# Patient Record
Sex: FEMALE | Race: WHITE | NOT HISPANIC OR LATINO | Employment: FULL TIME | URBAN - METROPOLITAN AREA
[De-identification: names, ages, dates, MRNs, and addresses within clinical notes are randomized per-mention and may not be internally consistent; named-entity substitution may affect disease eponyms.]

---

## 2017-01-16 ENCOUNTER — GENERIC CONVERSION - ENCOUNTER (OUTPATIENT)
Dept: OTHER | Facility: OTHER | Age: 47
End: 2017-01-16

## 2017-06-30 ENCOUNTER — HOSPITAL ENCOUNTER (OUTPATIENT)
Dept: ULTRASOUND IMAGING | Facility: HOSPITAL | Age: 47
Discharge: HOME | End: 2017-06-30
Payer: COMMERCIAL

## 2017-06-30 ENCOUNTER — ALLSCRIPTS OFFICE VISIT (OUTPATIENT)
Dept: OTHER | Facility: OTHER | Age: 47
End: 2017-06-30

## 2017-06-30 DIAGNOSIS — R10.11 RIGHT UPPER QUADRANT PAIN: ICD-10-CM

## 2017-06-30 PROCEDURE — 76705 ECHO EXAM OF ABDOMEN: CPT

## 2017-09-07 DIAGNOSIS — Z12.31 ENCOUNTER FOR SCREENING MAMMOGRAM FOR MALIGNANT NEOPLASM OF BREAST: ICD-10-CM

## 2017-10-09 ENCOUNTER — HOSPITAL ENCOUNTER (OUTPATIENT)
Dept: MAMMOGRAPHY | Facility: CLINIC | Age: 47
Discharge: HOME | End: 2017-10-09
Payer: COMMERCIAL

## 2017-10-09 DIAGNOSIS — Z12.31 ENCOUNTER FOR SCREENING MAMMOGRAM FOR MALIGNANT NEOPLASM OF BREAST: ICD-10-CM

## 2017-10-09 PROCEDURE — G0202 SCR MAMMO BI INCL CAD: HCPCS

## 2017-12-06 ENCOUNTER — APPOINTMENT (EMERGENCY)
Dept: CT IMAGING | Facility: HOSPITAL | Age: 47
End: 2017-12-06
Payer: COMMERCIAL

## 2017-12-06 ENCOUNTER — HOSPITAL ENCOUNTER (OUTPATIENT)
Facility: HOSPITAL | Age: 47
Setting detail: OBSERVATION
LOS: 2 days | Discharge: HOME | End: 2017-12-08
Attending: EMERGENCY MEDICINE | Admitting: HOSPITALIST
Payer: COMMERCIAL

## 2017-12-06 DIAGNOSIS — R10.9 ABDOMINAL PAIN: ICD-10-CM

## 2017-12-06 DIAGNOSIS — D72.829 LEUKOCYTOSIS: ICD-10-CM

## 2017-12-06 DIAGNOSIS — K57.32 DIVERTICULITIS OF LARGE INTESTINE WITHOUT PERFORATION OR ABSCESS WITHOUT BLEEDING: Primary | ICD-10-CM

## 2017-12-06 PROBLEM — E03.9 HYPOTHYROID: Chronic | Status: ACTIVE | Noted: 2017-12-06

## 2017-12-06 PROBLEM — F32.A DEPRESSION: Chronic | Status: ACTIVE | Noted: 2017-12-06

## 2017-12-06 LAB
ALBUMIN SERPL BCP-MCNC: 3.9 G/DL (ref 3.5–5)
ALP SERPL-CCNC: 69 U/L (ref 46–116)
ALT SERPL W P-5'-P-CCNC: 19 U/L (ref 12–78)
ANION GAP SERPL CALCULATED.3IONS-SCNC: 11 MMOL/L (ref 4–13)
AST SERPL W P-5'-P-CCNC: 12 U/L (ref 5–45)
BACTERIA UR QL AUTO: ABNORMAL /HPF
BASOPHILS # BLD AUTO: 0.03 THOUSANDS/ΜL (ref 0–0.1)
BASOPHILS NFR BLD AUTO: 0 % (ref 0–1)
BILIRUB SERPL-MCNC: 0.6 MG/DL (ref 0.2–1)
BILIRUB UR QL STRIP: NEGATIVE
BUN SERPL-MCNC: 11 MG/DL (ref 5–25)
CALCIUM SERPL-MCNC: 10.2 MG/DL (ref 8.3–10.1)
CHLORIDE SERPL-SCNC: 100 MMOL/L (ref 100–108)
CLARITY UR: CLEAR
CO2 SERPL-SCNC: 29 MMOL/L (ref 21–32)
COLOR UR: YELLOW
CREAT SERPL-MCNC: 1.13 MG/DL (ref 0.6–1.3)
EOSINOPHIL # BLD AUTO: 0.07 THOUSAND/ΜL (ref 0–0.61)
EOSINOPHIL NFR BLD AUTO: 0 % (ref 0–6)
ERYTHROCYTE [DISTWIDTH] IN BLOOD BY AUTOMATED COUNT: 13.6 % (ref 11.6–15.1)
GFR SERPL CREATININE-BSD FRML MDRD: 58 ML/MIN/1.73SQ M
GLUCOSE SERPL-MCNC: 90 MG/DL (ref 65–140)
GLUCOSE UR STRIP-MCNC: NEGATIVE MG/DL
HCT VFR BLD AUTO: 41.7 % (ref 34.8–46.1)
HGB BLD-MCNC: 13.3 G/DL (ref 11.5–15.4)
HGB UR QL STRIP.AUTO: ABNORMAL
KETONES UR STRIP-MCNC: NEGATIVE MG/DL
LEUKOCYTE ESTERASE UR QL STRIP: NEGATIVE
LIPASE SERPL-CCNC: 92 U/L (ref 73–393)
LYMPHOCYTES # BLD AUTO: 1.05 THOUSANDS/ΜL (ref 0.6–4.47)
LYMPHOCYTES NFR BLD AUTO: 7 % (ref 14–44)
MCH RBC QN AUTO: 28.9 PG (ref 26.8–34.3)
MCHC RBC AUTO-ENTMCNC: 31.9 G/DL (ref 31.4–37.4)
MCV RBC AUTO: 91 FL (ref 82–98)
MONOCYTES # BLD AUTO: 0.95 THOUSAND/ΜL (ref 0.17–1.22)
MONOCYTES NFR BLD AUTO: 6 % (ref 4–12)
MUCOUS THREADS UR QL AUTO: ABNORMAL
NEUTROPHILS # BLD AUTO: 13.64 THOUSANDS/ΜL (ref 1.85–7.62)
NEUTS SEG NFR BLD AUTO: 87 % (ref 43–75)
NITRITE UR QL STRIP: NEGATIVE
NON-SQ EPI CELLS URNS QL MICRO: ABNORMAL /HPF
PH UR STRIP.AUTO: 5.5 [PH] (ref 4.5–8)
PLATELET # BLD AUTO: 453 THOUSANDS/UL (ref 149–390)
PMV BLD AUTO: 9.7 FL (ref 8.9–12.7)
POTASSIUM SERPL-SCNC: 3.7 MMOL/L (ref 3.5–5.3)
PROT SERPL-MCNC: 9.1 G/DL (ref 6.4–8.2)
PROT UR STRIP-MCNC: NEGATIVE MG/DL
RBC # BLD AUTO: 4.61 MILLION/UL (ref 3.81–5.12)
RBC #/AREA URNS AUTO: ABNORMAL /HPF
SODIUM SERPL-SCNC: 140 MMOL/L (ref 136–145)
SP GR UR STRIP.AUTO: <=1.005 (ref 1–1.03)
UROBILINOGEN UR QL STRIP.AUTO: 0.2 E.U./DL
WBC # BLD AUTO: 15.74 THOUSAND/UL (ref 4.31–10.16)
WBC #/AREA URNS AUTO: ABNORMAL /HPF

## 2017-12-06 PROCEDURE — 80053 COMPREHEN METABOLIC PANEL: CPT | Performed by: EMERGENCY MEDICINE

## 2017-12-06 PROCEDURE — 36415 COLL VENOUS BLD VENIPUNCTURE: CPT | Performed by: EMERGENCY MEDICINE

## 2017-12-06 PROCEDURE — 96374 THER/PROPH/DIAG INJ IV PUSH: CPT

## 2017-12-06 PROCEDURE — 81001 URINALYSIS AUTO W/SCOPE: CPT | Performed by: EMERGENCY MEDICINE

## 2017-12-06 PROCEDURE — 99285 EMERGENCY DEPT VISIT HI MDM: CPT

## 2017-12-06 PROCEDURE — 96375 TX/PRO/DX INJ NEW DRUG ADDON: CPT

## 2017-12-06 PROCEDURE — 83690 ASSAY OF LIPASE: CPT | Performed by: EMERGENCY MEDICINE

## 2017-12-06 PROCEDURE — 74177 CT ABD & PELVIS W/CONTRAST: CPT

## 2017-12-06 PROCEDURE — 96361 HYDRATE IV INFUSION ADD-ON: CPT

## 2017-12-06 PROCEDURE — 85025 COMPLETE CBC W/AUTO DIFF WBC: CPT | Performed by: EMERGENCY MEDICINE

## 2017-12-06 RX ORDER — BUPROPION HYDROCHLORIDE 150 MG/1
450 TABLET ORAL DAILY
Status: DISCONTINUED | OUTPATIENT
Start: 2017-12-07 | End: 2017-12-08 | Stop reason: HOSPADM

## 2017-12-06 RX ORDER — CIPROFLOXACIN 2 MG/ML
400 INJECTION, SOLUTION INTRAVENOUS EVERY 12 HOURS
Status: DISCONTINUED | OUTPATIENT
Start: 2017-12-07 | End: 2017-12-08

## 2017-12-06 RX ORDER — ONDANSETRON 2 MG/ML
4 INJECTION INTRAMUSCULAR; INTRAVENOUS ONCE
Status: COMPLETED | OUTPATIENT
Start: 2017-12-06 | End: 2017-12-06

## 2017-12-06 RX ORDER — ONDANSETRON 2 MG/ML
4 INJECTION INTRAMUSCULAR; INTRAVENOUS EVERY 6 HOURS PRN
Status: DISCONTINUED | OUTPATIENT
Start: 2017-12-06 | End: 2017-12-08 | Stop reason: HOSPADM

## 2017-12-06 RX ORDER — TRAMADOL HYDROCHLORIDE 50 MG/1
50 TABLET ORAL EVERY 6 HOURS PRN
Status: DISCONTINUED | OUTPATIENT
Start: 2017-12-06 | End: 2017-12-08 | Stop reason: HOSPADM

## 2017-12-06 RX ORDER — CIPROFLOXACIN 500 MG/1
500 TABLET, FILM COATED ORAL ONCE
Status: COMPLETED | OUTPATIENT
Start: 2017-12-06 | End: 2017-12-06

## 2017-12-06 RX ORDER — MOMETASONE FUROATE 1 MG/ML
1 SOLUTION TOPICAL DAILY
COMMUNITY
End: 2018-09-20 | Stop reason: SDUPTHER

## 2017-12-06 RX ORDER — METRONIDAZOLE 500 MG/1
500 TABLET ORAL ONCE
Status: COMPLETED | OUTPATIENT
Start: 2017-12-06 | End: 2017-12-06

## 2017-12-06 RX ORDER — FENTANYL CITRATE 50 UG/ML
50 INJECTION, SOLUTION INTRAMUSCULAR; INTRAVENOUS ONCE
Status: COMPLETED | OUTPATIENT
Start: 2017-12-06 | End: 2017-12-06

## 2017-12-06 RX ORDER — MOMETASONE FUROATE 1 MG/G
1 CREAM TOPICAL DAILY
Status: DISCONTINUED | OUTPATIENT
Start: 2017-12-07 | End: 2017-12-08

## 2017-12-06 RX ORDER — LEVOTHYROXINE SODIUM 88 UG/1
88 TABLET ORAL
Status: DISCONTINUED | OUTPATIENT
Start: 2017-12-07 | End: 2017-12-08 | Stop reason: HOSPADM

## 2017-12-06 RX ORDER — OXYCODONE HYDROCHLORIDE 5 MG/1
5 TABLET ORAL EVERY 6 HOURS PRN
Status: DISCONTINUED | OUTPATIENT
Start: 2017-12-06 | End: 2017-12-08 | Stop reason: HOSPADM

## 2017-12-06 RX ORDER — ACETAMINOPHEN 325 MG/1
650 TABLET ORAL EVERY 6 HOURS PRN
Status: DISCONTINUED | OUTPATIENT
Start: 2017-12-06 | End: 2017-12-08 | Stop reason: HOSPADM

## 2017-12-06 RX ADMIN — FENTANYL CITRATE 50 MCG: 50 INJECTION INTRAMUSCULAR; INTRAVENOUS at 14:41

## 2017-12-06 RX ADMIN — FENTANYL CITRATE 50 MCG: 50 INJECTION INTRAMUSCULAR; INTRAVENOUS at 18:54

## 2017-12-06 RX ADMIN — ONDANSETRON 4 MG: 2 INJECTION INTRAMUSCULAR; INTRAVENOUS at 14:40

## 2017-12-06 RX ADMIN — ONDANSETRON 4 MG: 2 INJECTION INTRAMUSCULAR; INTRAVENOUS at 18:52

## 2017-12-06 RX ADMIN — IOHEXOL 100 ML: 350 INJECTION, SOLUTION INTRAVENOUS at 15:41

## 2017-12-06 RX ADMIN — SODIUM CHLORIDE 1000 ML: 0.9 INJECTION, SOLUTION INTRAVENOUS at 14:37

## 2017-12-06 RX ADMIN — CIPROFLOXACIN 500 MG: 500 TABLET, FILM COATED ORAL at 16:23

## 2017-12-06 RX ADMIN — METRONIDAZOLE 500 MG: 500 TABLET ORAL at 16:23

## 2017-12-06 RX ADMIN — METRONIDAZOLE 500 MG: 500 INJECTION, SOLUTION INTRAVENOUS at 20:34

## 2017-12-06 NOTE — ED PROVIDER NOTES
History  Chief Complaint   Patient presents with    Abdominal Pain     pt reports starting with lower abdominal pain "accross" yesterday at lunch time  hx of diverticulitis feels same  last BM yesterday WNL  reoprts nausea w/o vomiting  hx cholecystectomy  50 y/o female with h/o diverticulitis presents today with lower abdominal pain which started yesterday  States no n/v/d  States symptoms are similar to prior episodes of diverticulitis which were years ago  Has had a colonoscopy as well as a surgical consultation for her recurrent diverticulitis but only prior abdominal surgery is a cholecystectomy  History provided by:  Patient  Abdominal Pain   Pain location:  LLQ and RLQ  Pain quality: aching and pressure    Pain radiates to:  Does not radiate  Pain severity:  Moderate  Onset quality:  Sudden  Timing:  Constant  Progression:  Worsening  Chronicity:  Recurrent  Context: previous surgery    Context: not alcohol use, not awakening from sleep, not diet changes, not eating, not laxative use, not medication withdrawal, not recent illness, not recent sexual activity, not recent travel, not retching, not sick contacts, not suspicious food intake and not trauma    Relieved by:  None tried  Worsened by:  Nothing  Ineffective treatments:  None tried  Associated symptoms: anorexia and nausea    Associated symptoms: no belching, no chest pain, no chills, no constipation, no cough, no diarrhea, no dysuria, no fatigue, no fever, no flatus, no hematemesis, no hematochezia, no hematuria, no melena, no shortness of breath, no sore throat and no vomiting    Risk factors: no alcohol abuse, no aspirin use, not elderly, has not had multiple surgeries, no NSAID use, not obese, not pregnant and no recent hospitalization        Prior to Admission Medications   Prescriptions Last Dose Informant Patient Reported? Taking?    Meth-Hyo-M Bl-Na Phos-Ph Sal (URIBEL) 118 MG CAPS Past Month at Unknown time  Yes Yes   Sig: Take 118 mg by mouth daily as needed  buPROPion (FORFIVO XL) 450 MG 24 hr tablet 12/5/2017 at Unknown time  Yes Yes   Sig: Take 450 mg by mouth every morning     ibuprofen (MOTRIN) 600 mg tablet Past Week at Unknown time  No Yes   Sig: Take 1 tablet by mouth every 6 (six) hours as needed for mild pain for up to 30 days   levothyroxine (SYNTHROID) 88 mcg tablet 12/5/2017 at Unknown time  Yes Yes   Sig: Take 88 mcg by mouth daily  mometasone (ELOCON) 0 1 % cream Past Week at Unknown time  Yes Yes   Sig: Apply 1 application topically daily  mometasone (ELOCON) 0 1 % lotion 12/6/2017 at Unknown time  Yes Yes   Sig: Apply 1 application topically daily      Facility-Administered Medications: None       Past Medical History:   Diagnosis Date    Anxiety     Colitis     Depression     Disease of thyroid gland     Hypothyroidism    Diverticulitis     Diverticulosis     Frequent UTI     Pituitary tumor     Pneumonia 2004    x1     Psychiatric disorder     Sarcoidosis of lung (HCC)     Possible- has right "lung pain" and is being evaluated   Stress incontinence     Wears contact lenses     Wears glasses        Past Surgical History:   Procedure Laterality Date    BRONCHOSCOPY N/A 9/13/2016    Procedure: BRONCHOSCOPY FLEXIBLE ( FROZEN SECTION) ; Surgeon: Sammy Hassan MD;  [facility]: [facility];  Service:     CHOLECYSTECTOMY      Laparoscopic    COLONOSCOPY      ESOPHAGOGASTRODUODENOSCOPY      ESSURE TUBAL LIGATION      UT BRONCHOSCOPY NEEDLE BX TRACHEA MAIN STEM&/BRON N/A 9/13/2016    Procedure: ENDOBRONCHIAL ULTRASOUND (EBUS);   Surgeon: Sammy Hassan MD;  [facility];  Service: Thoracic    UT CYSTOURETHROSCOPY N/A 11/21/2016    Procedure: CYSTOSCOPY;  Surgeon: Carol Godfrey MD;  Location[facility];  Service: UroGynecology            [address] 3 1- 4 CM FACE,FACIAL Left 12/8/2016    Procedure: Héctor Pendleton SKIN LESION EXCISION/BIOPSY;  Surgeon: Solis Salinas MD;  Location: [facility]; Service: Plastics    NV RECMPL WND HEAD,FAC,HAND 2 6-7 5 CM Left 12/8/2016    Procedure: COMPLEX CLOSURE;  Surgeon: Claudene Moon, MD;  Location:  MAIN OR;  Service: Plastics    NV SLING OPER STRES INCONTINENCE N/A 11/21/2016    Procedure: Teaishaa Satish;  Surgeon: Halina Zavala MD;  [facility];  Service: UroGynecology           SINUS SURGERY         History reviewed  No pertinent family history  I have reviewed and agree with the history as documented  Social History   Substance Use Topics    Smoking status: Never Smoker    Smokeless tobacco: Never Used    Alcohol use Yes      Comment: socially: weekly approx  4 drinks per week-5        Review of Systems   Constitutional: Negative for chills, fatigue and fever  HENT: Negative for sore throat  Eyes: Negative for visual disturbance  Respiratory: Negative for cough and shortness of breath  Cardiovascular: Negative for chest pain  Gastrointestinal: Positive for abdominal pain, anorexia and nausea  Negative for constipation, diarrhea, flatus, hematemesis, hematochezia, melena and vomiting  Genitourinary: Negative for dysuria and hematuria  Musculoskeletal: Negative for back pain  Skin: Negative for pallor, rash and wound  Neurological: Negative for light-headedness and headaches  Psychiatric/Behavioral: Negative for confusion  Physical Exam  ED Triage Vitals [12/06/17 1305]   Temperature Pulse Respirations Blood Pressure SpO2   98 1 °F (36 7 °C) 92 16 106/60 99 %      Temp Source Heart Rate Source Patient Position - Orthostatic VS BP Location FiO2 (%)   Oral Monitor Sitting Left arm --      Pain Score       8           Orthostatic Vital Signs  Vitals:    12/06/17 1305 12/06/17 1529 12/06/17 1625   BP: 106/60 112/63 105/61   Pulse: 92 82 97   Patient Position - Orthostatic VS: Sitting Lying Sitting       Physical Exam   Constitutional: She is oriented to person, place, and time   She appears well-developed and well-nourished  HENT:   Head: Normocephalic and atraumatic  Eyes: Pupils are equal, round, and reactive to light  Neck: Normal range of motion  Neck supple  Cardiovascular: Normal rate and regular rhythm  Pulmonary/Chest: Effort normal and breath sounds normal    Abdominal: Bowel sounds are normal  There is rebound and guarding (LLQ, suprapubic, RLQ)  Musculoskeletal: Normal range of motion  Neurological: She is alert and oriented to person, place, and time  Skin: Skin is warm and dry  Psychiatric: She has a normal mood and affect  Nursing note and vitals reviewed        ED Medications  Medications   sodium chloride 0 9 % bolus 1,000 mL (1,000 mL Intravenous New Bag 12/6/17 1437)   ondansetron (ZOFRAN) injection 4 mg (4 mg Intravenous Given 12/6/17 1440)   fentanyl citrate (PF) 100 MCG/2ML 50 mcg (50 mcg Intravenous Given 12/6/17 1441)   iohexol (OMNIPAQUE) 350 MG/ML injection (MULTI-DOSE) 100 mL (100 mL Intravenous Given 12/6/17 1541)   ciprofloxacin (CIPRO) tablet 500 mg (500 mg Oral Given 12/6/17 1623)   metroNIDAZOLE (FLAGYL) tablet 500 mg (500 mg Oral Given 12/6/17 1623)       Diagnostic Studies  Results Reviewed     Procedure Component Value Units Date/Time    Urine Microscopic [[id number]]  (Abnormal) Collected:  12/06/17 1633    Lab Status:  Final result Specimen:  Urine from Urine, Clean Catch Updated:  12/06/17 1653     RBC, UA None Seen /hpf      WBC, UA 0-1 (A) /hpf      Epithelial Cells Occasional /hpf      Bacteria, UA Occasional /hpf      MUCOUS THREADS Moderate    UA w Reflex to Microscopic [[id number]]  (Abnormal) Collected:  12/06/17 1633    Lab Status:  Final result Specimen:  Urine from Urine, Clean Catch Updated:  12/06/17 1638     Color, UA Yellow     Clarity, UA Clear     Specific Gravity, UA <=1 005     pH, UA 5 5     Leukocytes, UA Negative     Nitrite, UA Negative     Protein, UA Negative mg/dl      Glucose, UA Negative mg/dl      Ketones, UA Negative mg/dl Urobilinogen, UA 0 2 E U /dl      Bilirubin, UA Negative     Blood, UA Moderate (A)    Comprehensive metabolic panel [[id number]]  (Abnormal) Collected:  12/06/17 1437    Lab Status:  Final result Specimen:  Blood from Arm, Right Updated:  12/06/17 1506     Sodium 140 mmol/L      Potassium 3 7 mmol/L      Chloride 100 mmol/L      CO2 29 mmol/L      Anion Gap 11 mmol/L      BUN 11 mg/dL      Creatinine 1 13 mg/dL      Glucose 90 mg/dL      Calcium 10 2 (H) mg/dL      AST 12 U/L      ALT 19 U/L      Alkaline Phosphatase 69 U/L      Total Protein 9 1 (H) g/dL      Albumin 3 9 g/dL      Total Bilirubin 0 60 mg/dL      eGFR 58 ml/min/1 73sq m     Narrative:         National Kidney Disease Education Program recommendations are as follows:  GFR calculation is accurate only with a steady state creatinine  Chronic Kidney disease less than 60 ml/min/1 73 sq  meters  Kidney failure less than 15 ml/min/1 73 sq  meters      Lipase [[id number]]  (Normal) Collected:  12/06/17 1437    Lab Status:  Final result Specimen:  Blood from Arm, Right Updated:  12/06/17 1506     Lipase 92 u/L     CBC and differential [[id number]]  (Abnormal) Collected:  12/06/17 1437    Lab Status:  Final result Specimen:  Blood from Arm, Right Updated:  12/06/17 1445     WBC 15 74 (H) Thousand/uL      RBC 4 61 Million/uL      Hemoglobin 13 3 g/dL      Hematocrit 41 7 %      MCV 91 fL      MCH 28 9 pg      MCHC 31 9 g/dL      RDW 13 6 %      MPV 9 7 fL      Platelets 808 (H) Thousands/uL      Neutrophils Relative 87 (H) %      Lymphocytes Relative 7 (L) %      Monocytes Relative 6 %      Eosinophils Relative 0 %      Basophils Relative 0 %      Neutrophils Absolute 13 64 (H) Thousands/µL      Lymphocytes Absolute 1 05 Thousands/µL      Monocytes Absolute 0 95 Thousand/µL      Eosinophils Absolute 0 07 Thousand/µL      Basophils Absolute 0 03 Thousands/µL                  CT abdomen pelvis with contrast   Final Result by Robin Ynag MD (12/06 1556)      Acute uncomplicated sigmoid diverticulitis  Workstation performed: [id number]                    Procedures  Procedures       Phone Contacts  ED Phone Contact    ED Course  ED Course                                MDM  Number of Diagnoses or Management Options  Abdominal pain: new and requires workup  Diverticulitis of large intestine without perforation or abscess without bleeding: new and requires workup  Leukocytosis: new and requires workup  Diagnosis management comments: 4:05 PM  CT shows uncomplicated diverticulitis  Labs reveal elevated WBC consistent with infection otherwise normal  Discussed findings with patient and give her the option of admission versus DC home on p o  antibiotics  Will try a dose of p o  antibiotics here and see how she feels  4:56 PM  Patient took PO abx but doesn't feel well  Having more pain, more nausea  MDM: Patient presents to the Emergency Department and was diagnosed with acute Diverticulitis  This is a new problem that will require additional planned work-up in a hospitalized setting  Clinical laboratory testing, radiology imaging, and medical testing (EKG) were ordered  I independently reviewed the radiologic imaging, EKG, and laboratory studies  This case is considered high risk secondary to the above listed disease process that poses a threat to bodily function that requires further diagnostic testing and management which may include the administration of parenteral controlled substances  Discussed with ELINA  We had a detailed discussion of the patient's condition and case,  including need for admission  Accepts to his/her service  Bed request/bridging orders placed                 Amount and/or Complexity of Data Reviewed  Clinical lab tests: ordered and reviewed  Tests in the radiology section of CPT®: ordered and reviewed  Tests in the medicine section of CPT®: reviewed and ordered  Review and summarize past medical records: yes  Independent visualization of images, tracings, or specimens: yes    Risk of Complications, Morbidity, and/or Mortality  Presenting problems: high  Diagnostic procedures: high  Management options: high    Patient Progress  Patient progress: stable    CritCare Time    Disposition  Final diagnoses:   Diverticulitis of large intestine without perforation or abscess without bleeding   Leukocytosis   Abdominal pain     Time reflects when diagnosis was documented in both MDM as applicable and the Disposition within this note     Time User Action Codes Description Comment    12/6/2017  4:07 PM Josias Powers [K57 [id number]] Diverticulitis of large intestine without perforation or abscess without bleeding     12/6/2017  4:07 PM Parul Garcia [[id number]] Leukocytosis     12/6/2017  4:07 PM Josias Powers [R10 [id number]] Abdominal pain       ED Disposition     None      Follow-up Information    None       Patient's Medications   Discharge Prescriptions    No medications on file     No discharge procedures on file      ED Provider  Electronically Signed by           Lyubov Navarro DO  12/06/17 4148

## 2017-12-06 NOTE — H&P
History and Physical - [facility] Internal Medicine    Patient Information: Lillie Resendiz 49 y o  female MRN: [id number]  Unit/Bed#: ED 28 Encounter: [id number]  Admitting Physician: Best Nguyen PA-C  PCP: Jose M Jose  Date of Admission:  12/06/17    Assessment/Plan:    Hospital Problem List:     Principal Problem:    Sigmoid diverticulitis  Active Problems:    Depression    Hypothyroid      Plan for the Primary Problem(s):  · Sigmoid Diverticulitis   · Admit patient to med/surgical under inpatient status  · Start empiric antibiotics  · Cipro 400 mg IV Q12  · Flagyl 500 mg IV Q8  · Symptomatic treatment  · Tylenol 650 mg PO Q6 PRN fevers, mild pain  · Tramadol 50 mg PO Q6 PRN moderate pain  · Oxycodone 5 mg PO Q6 PRN severe pain  · Zofran 4 mg IV Q6 PRN N/V  · Instructed her to follow up with general surgeon at discharge  · Clear liquid diet advance as tolerated     Plan for Additional Problems:   · Depression  · Continue bupropion   · Hypothyroid  · Continue Levothyroxine    VTE Prophylaxis: Enoxaparin (Lovenox)  / reason for no mechanical VTE prophylaxis None needed   Code Status: Full code   POLST: POLST form is not discussed and not completed at this time  Anticipated Length of Stay:  Patient will be admitted on an Inpatient basis with an anticipated length of stay of  Greater than 2 midnights  Justification for Hospital Stay: diverticulitis needing IV antibiotics, not tolerating PO     Total Time for Visit, including Counseling / Coordination of Care: 1 hour  Greater than 50% of this total time spent on direct patient counseling and coordination of care  Chief Complaint:   Abdominal pain     History of Present Illness:    Lillie Resendiz is a 49 y o  female with a history of diverticulitis who presents with abdominal pain that started yesterday around lunch time  She reports that her pain started out as general abdominal pain and then localized to her lower abdomen   She reports that it is still persisting in that area  She reports severe nausea and decreased appetite, but denies any episodes of vomiting  She denies any diarrhea and states that her last bowel movement was 1-2 days ago  She reports feeling clammy and cold, but states that her thermometer read a normal temperature  She admits to some malaise  She states that she has a history of diverticulitis, her last episode being around 2014  She states that she had followed a general surgeon for this issue, she does not remember whom but states that he is in network in the [facility] area, and she states that he told her to "stubbs" and not get a colectomy at that time, however she was told that if it happens again they would have to consider it  She denies chest pain or difficulty breathing  Denies recent travel or changes to her diet/medications  Review of Systems:    Review of Systems   Constitutional: Positive for appetite change, chills, fatigue and fever  Negative for diaphoresis  HENT: Negative for congestion, rhinorrhea and sore throat  Eyes: Negative for visual disturbance  Respiratory: Negative for cough, chest tightness, shortness of breath and wheezing  Cardiovascular: Negative for chest pain, palpitations and leg swelling  Gastrointestinal: Positive for abdominal pain  Negative for abdominal distention, blood in stool, constipation, diarrhea, nausea and vomiting  Genitourinary: Negative for dysuria  Musculoskeletal: Negative for arthralgias and myalgias  Neurological: Negative for dizziness, syncope, weakness, light-headedness, numbness and headaches  All other systems reviewed and are negative        Past Medical and Surgical History:     Past Medical History:   Diagnosis Date    Anxiety     Colitis     Depression     Disease of thyroid gland     Hypothyroidism    Diverticulitis     Diverticulosis     Frequent UTI     Pituitary tumor     Pneumonia 2004    x1     Psychiatric disorder     Sarcoidosis of lung ([facility] [address] )     Possible- has right "lung pain" and is being evaluated   Stress incontinence     Wears contact lenses     Wears glasses        Past Surgical History:   Procedure Laterality Date    BRONCHOSCOPY N/A 9/13/2016    Procedure: BRONCHOSCOPY FLEXIBLE ( FROZEN SECTION) ; Surgeon: Juan J Willams MD;  [facility];  Service:     CHOLECYSTECTOMY      Laparoscopic    COLONOSCOPY      ESOPHAGOGASTRODUODENOSCOPY      ESSURE TUBAL LIGATION      NC BRONCHOSCOPY NEEDLE BX TRACHEA MAIN STEM&/BRON N/A 9/13/2016    Procedure: ENDOBRONCHIAL ULTRASOUND (EBUS); Surgeon: Juan J Willams MD;  [facility];  Service: Thoracic    NC CYSTOURETHROSCOPY N/A 11/21/2016    Procedure: CYSTOSCOPY;  Surgeon: Megan Bustos MD;  [facility];  Service: UroGynecology            [address] 3 1- 4 CM FACE,FACIAL Left 12/8/2016    Procedure: CHEEK SKIN LESION EXCISION/BIOPSY;  Surgeon: Selvin Wright MD;  [facility];  Service: Plastics    NC RECMPL WND HEAD,FAC,HAND 2 6-7 5 CM Left 12/8/2016    Procedure: COMPLEX CLOSURE;  Surgeon: Selvin Wright MD;  [facility];  Service: Plastics    NC SLING OPER STRES INCONTINENCE N/A 11/21/2016    Procedure: Allison Baba;  Surgeon: Megan Bustos MD;  [facility];  Service: UroGynecology           SINUS SURGERY         Meds/Allergies:    Prior to Admission medications    Medication Sig Start Date End Date Taking? Authorizing Provider   buPROPion (FORFIVO XL) 450 MG 24 hr tablet Take 450 mg by mouth every morning     Yes Historical Provider, MD   ibuprofen (MOTRIN) 600 mg tablet Take 1 tablet by mouth every 6 (six) hours as needed for mild pain for up to 30 days 11/21/16 12/6/17 Yes Sophie Rodriguez MD   levothyroxine (SYNTHROID) 88 mcg tablet Take 88 mcg by mouth daily  Yes Historical Provider, MD   Meth-Hyo-M Bl-Na Phos-Ph Sal (URIBEL) 118 MG CAPS Take 118 mg by mouth daily as needed     Yes Historical Provider, MD mometasone (ELOCON) 0 1 % cream Apply 1 application topically daily  Yes Historical Provider, MD   mometasone (ELOCON) 0 1 % lotion Apply 1 application topically daily   Yes Historical Provider, MD     I have reviewed home medications with patient personally  Allergies: Allergies   Allergen Reactions    Bactrim [Sulfamethoxazole-Trimethoprim] Hives    Serotonin Reuptake Inhibitors (Ssris)        Effexor had bad effects when being weaned off- has a pituitary  tumor       Social History:     Marital Status:    Occupation: Noncontributory   Patient Pre-hospital Living Situation: Home  Patient Pre-hospital Level of Mobility: Full   Patient Pre-hospital Diet Restrictions: None  Substance Use History:   History   Alcohol Use    Yes     Comment: socially: weekly approx  4 drinks per week-5     History   Smoking Status    Never Smoker   Smokeless Tobacco    Never Used     History   Drug Use No       Family History:    History reviewed  No pertinent family history  Physical Exam:     Vitals:   Blood Pressure: 105/61 (12/06/17 1625)  Pulse: 97 (12/06/17 1625)  Temperature: 98 1 °F (36 7 °C) (12/06/17 1305)  Temp Source: Oral (12/06/17 1305)  Respirations: 18 (12/06/17 1625)  Height: 5' 2" (157 5 cm) (12/06/17 1305)  Weight - Scale: 60 3 kg (133 lb) (12/06/17 1305)  SpO2: 100 % (12/06/17 1625)    Physical Exam   Constitutional: She is oriented to person, place, and time  Vital signs are normal  She appears well-developed and well-nourished  Non-toxic appearance  She appears distressed (mild)  HENT:   Head: Normocephalic and atraumatic  Mouth/Throat: Mucous membranes are not dry  No oral lesions  No oropharyngeal exudate, posterior oropharyngeal edema, posterior oropharyngeal erythema or tonsillar abscesses  Eyes: Conjunctivae and EOM are normal  Pupils are equal, round, and reactive to light  Right pupil is round and reactive  Left pupil is round and reactive   Pupils are equal    Neck: Neck supple  Cardiovascular: Normal rate, regular rhythm, S1 normal, S2 normal, normal heart sounds and intact distal pulses  Exam reveals no S3 and no S4  No murmur heard  Pulmonary/Chest: Effort normal and breath sounds normal  No accessory muscle usage  No respiratory distress  She has no decreased breath sounds  She has no wheezes  She has no rhonchi  She has no rales  She exhibits no tenderness  Abdominal: Soft  Bowel sounds are normal  She exhibits no distension and no mass  There is generalized tenderness  There is no rigidity, no rebound and no guarding  Neurological: She is alert and oriented to person, place, and time  No cranial nerve deficit or sensory deficit  Skin: Skin is warm and dry  Additional Data:     Lab Results: I have personally reviewed pertinent reports  Results from last 7 days  Lab Units 12/06/17  1437   WBC Thousand/uL 15 74*   HEMOGLOBIN g/dL 13 3   HEMATOCRIT % 41 7   PLATELETS Thousands/uL 453*   NEUTROS PCT % 87*   LYMPHS PCT % 7*   MONOS PCT % 6   EOS PCT % 0       Results from last 7 days  Lab Units 12/06/17  1437   SODIUM mmol/L 140   POTASSIUM mmol/L 3 7   CHLORIDE mmol/L 100   CO2 mmol/L 29   BUN mg/dL 11   CREATININE mg/dL 1 13   CALCIUM mg/dL 10 2*   TOTAL PROTEIN g/dL 9 1*   BILIRUBIN TOTAL mg/dL 0 60   ALK PHOS U/L 69   ALT U/L 19   AST U/L 12   GLUCOSE RANDOM mg/dL 90           Imaging: I have personally reviewed pertinent reports  Ct Abdomen Pelvis With Contrast    Result Date: 12/6/2017  Narrative: CT ABDOMEN AND PELVIS WITH IV CONTRAST INDICATION:  Left lower quadrant pain  COMPARISON: 1/31/2015  TECHNIQUE:  CT examination of the abdomen and pelvis was performed  Reformatted images were created in axial, sagittal, and coronal planes  Radiation dose length product (DLP) for this visit:  240 mGy-cm     This examination, like all CT scans performed in the [facility], was performed utilizing techniques to minimize radiation dose exposure, including the use of iterative reconstruction and automated exposure control  IV Contrast:  100 mL of iohexol (OMNIPAQUE)     Enteric Contrast:  Enteric contrast was not administered  FINDINGS: ABDOMEN LOWER CHEST:  No significant abnormalities identified in the lower chest  LIVER/BILIARY TREE:  Unremarkable  GALLBLADDER:  Gallbladder is surgically absent  SPLEEN:  Persistent small hypodensity at the posterior inferior aspect of the spleen, possibly a small hemangioma or lymphangioma  PANCREAS:  Unremarkable  ADRENAL GLANDS:  Unremarkable  KIDNEYS/URETERS:  Unremarkable  No hydronephrosis  STOMACH AND BOWEL:  There is colonic diverticulosis, with short segment of thickening and mild pericolic inflammatory stranding in the mid sigmoid compatible with acute diverticulitis  There is no associated perforation or abscess  No intestinal obstruction  APPENDIX:  No findings to suggest appendicitis  ABDOMINOPELVIC CAVITY:  There is minimal pelvic free fluid, which may be physiologic given the patient's age and gender  No free intraperitoneal air  No lymphadenopathy  VESSELS:  Unremarkable for patient's age  PELVIS REPRODUCTIVE ORGANS:  Unremarkable for patient's age  URINARY BLADDER:  Unremarkable  ABDOMINAL WALL/INGUINAL REGIONS:  Unremarkable  OSSEOUS STRUCTURES:  No acute fracture or destructive osseous lesion  Impression: Acute uncomplicated sigmoid diverticulitis  Workstation performed: [id number]       EKG, Pathology, and Other Studies Reviewed on Admission:   · CT abdomen/pelvis with contrast: acute sigmoid diverticulitis     Allscripts Records Reviewed: Yes     ** Please Note: Dragon 360 Dictation voice to text software may have been used in the creation of this document   **

## 2017-12-07 LAB
ANION GAP SERPL CALCULATED.3IONS-SCNC: 10 MMOL/L (ref 4–13)
BUN SERPL-MCNC: 9 MG/DL (ref 5–25)
CALCIUM SERPL-MCNC: 8.5 MG/DL (ref 8.3–10.1)
CHLORIDE SERPL-SCNC: 104 MMOL/L (ref 100–108)
CO2 SERPL-SCNC: 26 MMOL/L (ref 21–32)
CREAT SERPL-MCNC: 1.02 MG/DL (ref 0.6–1.3)
ERYTHROCYTE [DISTWIDTH] IN BLOOD BY AUTOMATED COUNT: 13.6 % (ref 11.6–15.1)
GFR SERPL CREATININE-BSD FRML MDRD: 65 ML/MIN/1.73SQ M
GLUCOSE SERPL-MCNC: 110 MG/DL (ref 65–140)
HCT VFR BLD AUTO: 36.1 % (ref 34.8–46.1)
HGB BLD-MCNC: 11.2 G/DL (ref 11.5–15.4)
MCH RBC QN AUTO: 28.5 PG (ref 26.8–34.3)
MCHC RBC AUTO-ENTMCNC: 31 G/DL (ref 31.4–37.4)
MCV RBC AUTO: 92 FL (ref 82–98)
PLATELET # BLD AUTO: 380 THOUSANDS/UL (ref 149–390)
PMV BLD AUTO: 9.6 FL (ref 8.9–12.7)
POTASSIUM SERPL-SCNC: 3.5 MMOL/L (ref 3.5–5.3)
RBC # BLD AUTO: 3.93 MILLION/UL (ref 3.81–5.12)
SODIUM SERPL-SCNC: 140 MMOL/L (ref 136–145)
WBC # BLD AUTO: 9.34 THOUSAND/UL (ref 4.31–10.16)

## 2017-12-07 PROCEDURE — 85027 COMPLETE CBC AUTOMATED: CPT | Performed by: PHYSICIAN ASSISTANT

## 2017-12-07 PROCEDURE — 80048 BASIC METABOLIC PNL TOTAL CA: CPT | Performed by: PHYSICIAN ASSISTANT

## 2017-12-07 RX ORDER — DICYCLOMINE HYDROCHLORIDE 10 MG/1
10 CAPSULE ORAL
Status: DISCONTINUED | OUTPATIENT
Start: 2017-12-07 | End: 2017-12-08 | Stop reason: HOSPADM

## 2017-12-07 RX ADMIN — DICYCLOMINE HYDROCHLORIDE 10 MG: 10 CAPSULE ORAL at 21:12

## 2017-12-07 RX ADMIN — METRONIDAZOLE 500 MG: 500 INJECTION, SOLUTION INTRAVENOUS at 04:25

## 2017-12-07 RX ADMIN — ENOXAPARIN SODIUM 40 MG: 40 INJECTION SUBCUTANEOUS at 08:09

## 2017-12-07 RX ADMIN — ONDANSETRON 4 MG: 2 INJECTION INTRAMUSCULAR; INTRAVENOUS at 07:29

## 2017-12-07 RX ADMIN — METRONIDAZOLE 500 MG: 500 INJECTION, SOLUTION INTRAVENOUS at 12:24

## 2017-12-07 RX ADMIN — CIPROFLOXACIN 400 MG: 2 INJECTION, SOLUTION INTRAVENOUS at 05:35

## 2017-12-07 RX ADMIN — TRAMADOL HYDROCHLORIDE 50 MG: 50 TABLET, FILM COATED ORAL at 04:28

## 2017-12-07 RX ADMIN — METRONIDAZOLE 500 MG: 500 INJECTION, SOLUTION INTRAVENOUS at 21:05

## 2017-12-07 RX ADMIN — TRAMADOL HYDROCHLORIDE 50 MG: 50 TABLET, FILM COATED ORAL at 12:24

## 2017-12-07 RX ADMIN — CIPROFLOXACIN 400 MG: 2 INJECTION, SOLUTION INTRAVENOUS at 16:56

## 2017-12-07 RX ADMIN — ONDANSETRON 4 MG: 2 INJECTION INTRAMUSCULAR; INTRAVENOUS at 13:24

## 2017-12-07 RX ADMIN — LEVOTHYROXINE SODIUM 88 MCG: 88 TABLET ORAL at 05:36

## 2017-12-07 RX ADMIN — DICYCLOMINE HYDROCHLORIDE 10 MG: 10 CAPSULE ORAL at 16:56

## 2017-12-07 RX ADMIN — BUPROPION HYDROCHLORIDE 450 MG: 150 TABLET, FILM COATED, EXTENDED RELEASE ORAL at 08:09

## 2017-12-07 NOTE — PLAN OF CARE
Problem: INFECTION - ADULT  Goal: Absence or prevention of progression during hospitalization  INTERVENTIONS:  - Assess and monitor for signs and symptoms of infection  - Monitor lab/diagnostic results  - Monitor all insertion sites, i e  indwelling lines, tubes, and drains  - Monitor endotracheal (as able) and nasal secretions for changes in amount and color  - [place] appropriate cooling/warming therapies per order  - Administer medications as ordered  - Instruct and encourage patient and family to use good hand hygiene technique  - Identify and instruct in appropriate isolation precautions for identified infection/condition   Outcome: Progressing    Goal: Absence of fever/infection during neutropenic period  INTERVENTIONS:  - Monitor WBC  - Implement neutropenic guidelines   Outcome: Progressing      Problem: SAFETY ADULT  Goal: Patient will remain free of falls  INTERVENTIONS:  - Assess patient frequently for physical needs  -  Identify cognitive and physical deficits and behaviors that affect risk of falls    -  [place] fall precautions as indicated by assessment   - Educate patient/family on patient safety including physical limitations  - Instruct patient to call for assistance with activity based on assessment  - Modify environment to reduce risk of injury  - Consider OT/PT consult to assist with strengthening/mobility   Outcome: Progressing    Goal: Maintain or return to baseline ADL function  INTERVENTIONS:  -  Assess patient's ability to carry out ADLs; assess patient's baseline for ADL function and identify physical deficits which impact ability to perform ADLs (bathing, care of mouth/teeth, toileting, grooming, dressing, etc )  - Assess/evaluate cause of self-care deficits   - Assess range of motion  - Assess patient's mobility; develop plan if impaired  - Assess patient's need for assistive devices and provide as appropriate  - Encourage maximum independence but intervene and supervise when necessary  ¯ Involve family in performance of ADLs  ¯ Assess for home care needs following discharge   ¯ Request OT consult to assist with ADL evaluation and planning for discharge  ¯ Provide patient education as appropriate   Outcome: Progressing    Goal: Maintain or return mobility status to optimal level  INTERVENTIONS:  - Assess patient's baseline mobility status (ambulation, transfers, stairs, etc )    - Identify cognitive and physical deficits and behaviors that affect mobility  - Identify mobility aids required to assist with transfers and/or ambulation (gait belt, sit-to-stand, lift, walker, cane, etc )  - [place] fall precautions as indicated by assessment  - Record patient progress and toleration of activity level on Mobility SBAR; progress patient to next Phase/Stage  - Instruct patient to call for assistance with activity based on assessment  - Request Rehabilitation consult to assist with strengthening/weightbearing, etc    Outcome: Progressing      Problem: DISCHARGE PLANNING  Goal: Discharge to home or other facility with appropriate resources  INTERVENTIONS:  - Identify barriers to discharge w/patient and caregiver  - Arrange for needed discharge resources and transportation as appropriate  - Identify discharge learning needs (meds, wound care, etc )  - Arrange for interpretive services to assist at discharge as needed  - Refer to Case Management Department for coordinating discharge planning if the patient needs post-hospital services based on physician/advanced practitioner order or complex needs related to functional status, cognitive ability, or social support system   Outcome: Progressing      Problem: Knowledge Deficit  Goal: Patient/family/caregiver demonstrates understanding of disease process, treatment plan, medications, and discharge instructions  Complete learning assessment and assess knowledge base    Interventions:  - Provide teaching at level of understanding  - Provide teaching via preferred learning methods   Outcome: Progressing      Problem: GASTROINTESTINAL - ADULT  Goal: Maintains or returns to baseline bowel function  INTERVENTIONS:  - Assess bowel function  - Encourage oral fluids to ensure adequate hydration  - Administer IV fluids as ordered to ensure adequate hydration  - Administer ordered medications as needed  - Encourage mobilization and activity  - Nutrition services referral to assist patient with appropriate food choices   Outcome: Progressing    Goal: Maintains adequate nutritional intake  INTERVENTIONS:  - Monitor percentage of each meal consumed  - Identify factors contributing to decreased intake, treat as appropriate  - Assist with meals as needed  - Monitor I&O, WT and lab values  - Obtain nutrition services referral as needed   Outcome: Progressing

## 2017-12-07 NOTE — CASE MANAGEMENT
[facility] in the [facility] by Justin Harris for 2017  Network Utilization Review Department  Phone: [phone]; Fax [phone]  ATTENTION: The Network Utilization Review Department is now centralized for our 7 Facilities  Make a note that we have a new phone and fax numbers for our Department  Please call with any questions or concerns to [phone] and carefully follow the prompts so that you are directed to the right person  All voicemails are confidential  Fax any determinations, approvals, denials, and requests for initial or continue stay review clinical to [phone]  Due to HIGH CALL volume, it would be easier if you could please send faxed requests to expedite your requests and in part, help us provide discharge notifications faster  Initial Clinical Review    Admission: Date/Time/Statement: 12/6/17 @ 1659     Orders Placed This Encounter   Procedures    Inpatient Admission (expected length of stay for this patient is greater than two midnights)     Standing Status:   Standing     Number of Occurrences:   1     Order Specific Question:   Admitting Physician     Answer:   Chance Hope [[id number]]     Order Specific Question:   Level of Care     Answer:   Med Surg [16]     Order Specific Question:   Estimated length of stay     Answer:   More than 2 Midnights     Order Specific Question:   Certification     Answer:   I certify that inpatient services are medically necessary for this patient for a duration of greater than two midnights  See H&P and MD Progress Notes for additional information about the patient's course of treatment           ED: Date/Time/Mode of Arrival:   ED Arrival Information     Expected Arrival Acuity Means of Arrival Escorted By Service Admission Type    - 12/6/2017 12:36 Urgent Walk-In Self General Medicine Urgent    Arrival Complaint    abd pain, hx diverticulitis          Chief Complaint:   Chief Complaint   Patient presents with    Abdominal Pain     pt reports starting with lower abdominal pain "accross" yesterday at lunch time  hx of diverticulitis feels same  last BM yesterday WNL  reoprts nausea w/o vomiting  hx cholecystectomy  History of Illness:    Nick Barbosa is a 49 y o  female with a history of diverticulitis who presents with abdominal pain that started yesterday around lunch time  She reports that her pain started out as general abdominal pain and then localized to her lower abdomen  She reports that it is still persisting in that area  She reports severe nausea and decreased appetite, but denies any episodes of vomiting  She denies any diarrhea and states that her last bowel movement was 1-2 days ago  She reports feeling clammy and cold, but states that her thermometer read a normal temperature  She admits to some malaise  She states that she has a history of diverticulitis, her last episode being around 2014  She states that she had followed a general surgeon for this issue, she does not remember whom but states that he is in network in the [facility], and she states that he told her to "stubbs" and not get a colectomy at that time, however she was told that if it happens again they would have to consider it  She denies chest pain or difficulty breathing  Denies recent travel or changes to her diet/medications         ED Vital Signs:   ED Triage Vitals [12/06/17 1305]   Temperature Pulse Respirations Blood Pressure SpO2   98 1 °F (36 7 °C) 92 16 106/60 99 %      Temp Source Heart Rate Source Patient Position - Orthostatic VS BP Location FiO2 (%)   Oral Monitor Sitting Left arm --      Pain Score       8        Wt Readings from Last 1 Encounters:   12/06/17 60 3 kg (133 lb)       Vital Signs (abnormal): wnl     Abnormal Labs/Diagnostic Test Results: rita  10 2, total prot  9 1, wbc 15 74, plt ct  453  CT abd -   Acute uncomplicated sigmoid diverticulitis          ED Treatment:   Medication Administration from 12/06/2017 1236 to 12/06/2017 1937       Date/Time Order Dose Route Action Action by Comments     12/06/2017 1800 sodium chloride 0 9 % bolus 1,000 mL 0 mL Intravenous Stopped Page Sanchez RN      12/06/2017 1437 sodium chloride 0 9 % bolus 1,000 mL 1,000 mL Intravenous Kriset 37 Page Sanchez RN      12/06/2017 1440 ondansetron (ZOFRAN) injection 4 mg 4 mg Intravenous Given [place] STARR Sanchez      12/06/2017 1441 fentanyl citrate (PF) 100 MCG/2ML 50 mcg 50 mcg Intravenous Given Page Sanchez RN      12/06/2017 1541 iohexol (OMNIPAQUE) 350 MG/ML injection (MULTI-DOSE) 100 mL 100 mL Intravenous Given Moses Rendon      12/06/2017 1623 ciprofloxacin (CIPRO) tablet 500 mg 500 mg Oral Given Page Sanchez RN      12/06/2017 1623 metroNIDAZOLE (FLAGYL) tablet 500 mg 500 mg Oral Given Page STARR Sanchez      12/06/2017 1854 fentanyl citrate (PF) 100 MCG/2ML 50 mcg 50 mcg Intravenous Given Page Sanchez RN      12/06/2017 1852 ondansetron (ZOFRAN) injection 4 mg 4 mg Intravenous Given Page Sanchez RN           Past Medical/Surgical History:    Active Ambulatory Problems     Diagnosis Date Noted    No Active Ambulatory Problems     Resolved Ambulatory Problems     Diagnosis Date Noted    No Resolved Ambulatory Problems     Past Medical History:   Diagnosis Date    Anxiety     Colitis     Depression     Disease of thyroid gland     Diverticulitis     Diverticulosis     Frequent UTI     Pituitary tumor     Pneumonia 2004    Psychiatric disorder     [facility]arc[facility]osis [facility] lung [address] [address]     Stress incontinence     Wears contact lenses     Wears glasses        Admitting Diagnosis: Leukocytosis [D72 829]  Abdominal pain [R10 9]  Diverticulitis of large intestine without perforation or abscess without bleeding [K57 32]    Age/Sex: 49 y o  female    Assessment/Plan: Hospital Problem List:      Principal Problem:    Sigmoid diverticulitis  Active Problems:    Depression Hypothyroid   Plan for the Primary Problem(s):  · Sigmoid Diverticulitis   ? Admit patient to med/surgical under inpatient status  ? Start empiric antibiotics  § Cipro 400 mg IV Q12  § Flagyl 500 mg IV Q8  ? Symptomatic treatment  § Tylenol 650 mg PO Q6 PRN fevers, mild pain  § Tramadol 50 mg PO Q6 PRN moderate pain  § Oxycodone 5 mg PO Q6 PRN severe pain  § Zofran 4 mg IV Q6 PRN N/V  ? Instructed her to follow up with general surgeon at discharge  ? Clear liquid diet advance as tolerated    Plan for Additional Problems:   · Depression  ? Continue bupropion   · Hypothyroid  ? Continue Levothyroxine   VTE Prophylaxis: Enoxaparin (Lovenox)  / reason for no mechanical VTE prophylaxis None needed   Code Status: Full code   POLST: POLST form is not discussed and not completed at this time    Anticipated Length of Stay:  Patient will be admitted on an Inpatient basis with an anticipated length of stay of  Greater than 2 midnights     Justification for Hospital Stay: diverticulitis needing IV antibiotics, not tolerating PO        Admission Orders:  Scheduled Meds:   buPROPion 450 mg Oral Daily   ciprofloxacin 400 mg Intravenous Q12H   enoxaparin 40 mg Subcutaneous Daily   levothyroxine 88 mcg Oral Early Morning   metroNIDAZOLE 500 mg Intravenous Q8H   mometasone 1 application Topical Daily     Continuous Infusions:    PRN Meds:   acetaminophen    Ondansetron   x1    oxyCODONE    traMADol    Clear liq diet   Up and OOB as [place]   12/7 cbc, bmp    H&H   11 2  36 1

## 2017-12-07 NOTE — ED NOTES
Pt resting comfortably, VSS, no distress noted, pt awaiting bed to be cleaned for admission, no further needs at this time, will continue to monitor     Jing Thornton RN  12/06/17 3669

## 2017-12-07 NOTE — PROGRESS NOTES
Progress Note - Jennifer Albert 49 y o  female MRN: [id number]    Unit/Bed#: -01 Encounter: [id number]        * Sigmoid diverticulitis   Assessment & Plan       Assessment:  Improved abdominal cramping with intermittent nausea and no BM this a m  improving leukocytosis     Plan:  Continue broad-spectrum antibiotic therapy ciprofloxacin/metronidazole likely transition to p  o  in the a m  upgrade from clear liquid diet to full liquids likely upgrade to GI low-fat/low residue in the a m  continue pain control-Bentyl q i d , Tylenol p r n  mild pain, tramadol p r n  moderate pain, oxycodone p r n  severe pain  Will need close follow-up with outpatient gastroenterologist with repeat colonoscopy  Hypothyroid   Assessment & Plan       Assessment:  Stable     Plan:  Continue home medication-levothyroxine        Depression   Assessment & Plan       Assessment:  Stable mood     Plan:  Continue home medication-Wellbutrin            VTE Pharmacologic Prophylaxis:   Pharmacologic: Enoxaparin (Lovenox)  Mechanical VTE Prophylaxis in Place: Yes    Patient Centered Rounds: I have performed bedside rounds with nursing staff today  Discussions with Specialists or Other Care Team Provider:  RNmina    Education and Discussions with Family / Patient:  Discussed with patient at the bedside in regards to slow upgrading diet and close follow-up with Gastroenterology in regards to recurrent diverticulitis and repeat colonoscopy    Time Spent for Care: 20 minutes  More than 50% of total time spent on counseling and coordination of care as described above  Current Length of Stay: 1 day(s)    Current Patient Status: Inpatient   Certification Statement: The patient will continue to require additional inpatient hospital stay due to Poor p o  intake with intermittent nausea secondary to sigmoid diverticulitis requiring IV antibiotic therapy  Discharge Plan:  Likely discharge in the a m  pending improvement of p o  intake and transition from IV antibiotics to p  o   Disposition home    Code Status: Level 1 - Full Code      Subjective:   Patient was seen and examined at the bedside with RN  Patient admitted to intermittent abdominal cramping with moderate nausea without episodes of vomiting or bowel movement able to pass gas without hematuria or dysuria hematochezia or melena  Patient denied fevers or chills, chest pain, shortness of breath, palpitations  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  Objective:     Vitals:   Temp (24hrs), Av 9 °F (36 6 °C), Min:97 7 °F (36 5 °C), Max:98 °F (36 7 °C)    HR:  [76-97] 76  Resp:  [16-18] 16  BP: ()/(51-63) 98/60  SpO2:  [98 %-100 %] 98 %  Body mass index is 24 33 kg/m²  Input and Output Summary (last 24 hours): Intake/Output Summary (Last 24 hours) at 17 1331  Last data filed at 17 0837   Gross per 24 hour   Intake             1120 ml   Output                0 ml   Net             1120 ml       Physical Exam:     Physical Exam   Constitutional: She is oriented to person, place, and time  She appears well-developed  No distress  Lying fatigued in bed   HENT:   Head: Normocephalic and atraumatic  Mouth/Throat: Oropharynx is clear and moist  No oropharyngeal exudate  Eyes: Conjunctivae and EOM are normal  Pupils are equal, round, and reactive to light  Right eye exhibits no discharge  Left eye exhibits no discharge  No scleral icterus  Neck: Normal range of motion  Neck supple  No JVD present  No tracheal deviation present  Thyromegaly present  Cardiovascular: Normal rate  Exam reveals no gallop and no friction rub  No murmur heard  Pulmonary/Chest: Effort normal and breath sounds normal  No respiratory distress  She has no wheezes  She has no rales  She exhibits no tenderness  Abdominal: Soft  Bowel sounds are normal  She exhibits no distension and no mass  There is tenderness  There is no rebound and no guarding     Left lower quadrant abdominal tenderness   Musculoskeletal: Normal range of motion  She exhibits no edema, tenderness or deformity  Lymphadenopathy:     She has no cervical adenopathy  Neurological: She is alert and oriented to person, place, and time  She displays normal reflexes  No cranial nerve deficit  Coordination normal    Skin: Skin is warm and dry  No rash noted  She is not diaphoretic  No erythema  No pallor  Psychiatric: Her behavior is normal  Thought content normal        Additional Data:     Labs:      Results from last 7 days  Lab Units 12/07/17  0453 12/06/17  1437   WBC Thousand/uL 9 34 15 74*   HEMOGLOBIN g/dL 11 2* 13 3   HEMATOCRIT % 36 1 41 7   PLATELETS Thousands/uL 380 453*   NEUTROS PCT %  --  87*   LYMPHS PCT %  --  7*   MONOS PCT %  --  6   EOS PCT %  --  0       Results from last 7 days  Lab Units 12/07/17  0453 12/06/17  1437   SODIUM mmol/L 140 140   POTASSIUM mmol/L 3 5 3 7   CHLORIDE mmol/L 104 100   CO2 mmol/L 26 29   BUN mg/dL 9 11   CREATININE mg/dL 1 02 1 13   CALCIUM mg/dL 8 5 10 2*   TOTAL PROTEIN g/dL  --  9 1*   BILIRUBIN TOTAL mg/dL  --  0 60   ALK PHOS U/L  --  69   ALT U/L  --  19   AST U/L  --  12   GLUCOSE RANDOM mg/dL 110 90           * I Have Reviewed All Lab Data Listed Above  * Additional Pertinent Lab Tests Reviewed:  [facility] 65 Admission Reviewed    Imaging:    Imaging Reports Reviewed Today Include:  CT abdomen/pelvis  Imaging Personally Reviewed by Myself Includes:  CT abdomen/pelvis    Recent Cultures (last 7 days):           Last 24 Hours Medication List:     buPROPion 450 mg Oral Daily   ciprofloxacin 400 mg Intravenous Q12H   dicyclomine 10 mg Oral 4x Daily (AC & HS)   enoxaparin 40 mg Subcutaneous Daily   levothyroxine 88 mcg Oral Early Morning   metroNIDAZOLE 500 mg Intravenous Q8H   mometasone 1 application Topical Daily        Today, Patient Was Seen By: Emma Lacey PA-C    ** Please Note: Dragon 360 Dictation voice to text software may have been used in the creation of this document   **

## 2017-12-07 NOTE — PLAN OF CARE
DISCHARGE PLANNING     Discharge to home or other facility with appropriate resources Progressing        GASTROINTESTINAL - ADULT     Minimal or absence of nausea and/or vomiting Progressing     Maintains or returns to baseline bowel function Progressing     Maintains adequate nutritional intake Progressing        INFECTION - ADULT     Absence or prevention of progression during hospitalization Progressing     Absence of fever/infection during neutropenic period Progressing        Knowledge Deficit     Patient/family/caregiver demonstrates understanding of disease process, treatment plan, medications, and discharge instructions Progressing        PAIN - ADULT     Verbalizes/displays adequate comfort level or baseline comfort level Progressing        SAFETY ADULT     Patient will remain free of falls Progressing     Maintain or return to baseline ADL function Progressing     Maintain or return mobility status to optimal level Progressing

## 2017-12-07 NOTE — ASSESSMENT & PLAN NOTE
Assessment:  Improved abdominal cramping with intermittent nausea and no BM this a m  improving leukocytosis     Plan:  Continue broad-spectrum antibiotic therapy ciprofloxacin/metronidazole likely transition to p  o  in the a m  upgrade from clear liquid diet to full liquids likely upgrade to GI low-fat/low residue in the a m  continue pain control-Bentyl q i d , Tylenol p r n  mild pain, tramadol p r n  moderate pain, oxycodone p r n  severe pain  Will need close follow-up with outpatient gastroenterologist with repeat colonoscopy

## 2017-12-08 VITALS
BODY MASS INDEX: 24.48 KG/M2 | HEIGHT: 62 IN | DIASTOLIC BLOOD PRESSURE: 54 MMHG | WEIGHT: 133 LBS | TEMPERATURE: 98.3 F | SYSTOLIC BLOOD PRESSURE: 92 MMHG | OXYGEN SATURATION: 98 % | RESPIRATION RATE: 16 BRPM | HEART RATE: 73 BPM

## 2017-12-08 LAB
ANION GAP SERPL CALCULATED.3IONS-SCNC: 10 MMOL/L (ref 4–13)
BASOPHILS # BLD AUTO: 0.04 THOUSANDS/ΜL (ref 0–0.1)
BASOPHILS NFR BLD AUTO: 1 % (ref 0–1)
BUN SERPL-MCNC: 9 MG/DL (ref 5–25)
CALCIUM SERPL-MCNC: 8.9 MG/DL (ref 8.3–10.1)
CHLORIDE SERPL-SCNC: 105 MMOL/L (ref 100–108)
CO2 SERPL-SCNC: 27 MMOL/L (ref 21–32)
CREAT SERPL-MCNC: 1.08 MG/DL (ref 0.6–1.3)
EOSINOPHIL # BLD AUTO: 0.23 THOUSAND/ΜL (ref 0–0.61)
EOSINOPHIL NFR BLD AUTO: 4 % (ref 0–6)
ERYTHROCYTE [DISTWIDTH] IN BLOOD BY AUTOMATED COUNT: 13.4 % (ref 11.6–15.1)
GFR SERPL CREATININE-BSD FRML MDRD: 61 ML/MIN/1.73SQ M
GLUCOSE SERPL-MCNC: 106 MG/DL (ref 65–140)
HCT VFR BLD AUTO: 36.3 % (ref 34.8–46.1)
HGB BLD-MCNC: 11.3 G/DL (ref 11.5–15.4)
LYMPHOCYTES # BLD AUTO: 1.15 THOUSANDS/ΜL (ref 0.6–4.47)
LYMPHOCYTES NFR BLD AUTO: 20 % (ref 14–44)
MCH RBC QN AUTO: 29 PG (ref 26.8–34.3)
MCHC RBC AUTO-ENTMCNC: 31.1 G/DL (ref 31.4–37.4)
MCV RBC AUTO: 93 FL (ref 82–98)
MONOCYTES # BLD AUTO: 0.37 THOUSAND/ΜL (ref 0.17–1.22)
MONOCYTES NFR BLD AUTO: 7 % (ref 4–12)
NEUTROPHILS # BLD AUTO: 3.92 THOUSANDS/ΜL (ref 1.85–7.62)
NEUTS SEG NFR BLD AUTO: 68 % (ref 43–75)
PLATELET # BLD AUTO: 396 THOUSANDS/UL (ref 149–390)
PMV BLD AUTO: 9.8 FL (ref 8.9–12.7)
POTASSIUM SERPL-SCNC: 3.5 MMOL/L (ref 3.5–5.3)
RBC # BLD AUTO: 3.9 MILLION/UL (ref 3.81–5.12)
SODIUM SERPL-SCNC: 142 MMOL/L (ref 136–145)
WBC # BLD AUTO: 5.71 THOUSAND/UL (ref 4.31–10.16)

## 2017-12-08 PROCEDURE — 80048 BASIC METABOLIC PNL TOTAL CA: CPT | Performed by: PHYSICIAN ASSISTANT

## 2017-12-08 PROCEDURE — 85025 COMPLETE CBC W/AUTO DIFF WBC: CPT | Performed by: PHYSICIAN ASSISTANT

## 2017-12-08 RX ORDER — METRONIDAZOLE 500 MG/1
500 TABLET ORAL EVERY 8 HOURS SCHEDULED
Qty: 25 TABLET | Refills: 0 | Status: SHIPPED | OUTPATIENT
Start: 2017-12-08 | End: 2017-12-17

## 2017-12-08 RX ORDER — ONDANSETRON 4 MG/1
4 TABLET, FILM COATED ORAL EVERY 8 HOURS PRN
Qty: 30 TABLET | Refills: 0 | Status: SHIPPED | OUTPATIENT
Start: 2017-12-08 | End: 2018-05-15

## 2017-12-08 RX ORDER — CIPROFLOXACIN 500 MG/1
500 TABLET, FILM COATED ORAL EVERY 12 HOURS SCHEDULED
Status: DISCONTINUED | OUTPATIENT
Start: 2017-12-08 | End: 2017-12-08 | Stop reason: HOSPADM

## 2017-12-08 RX ORDER — CIPROFLOXACIN 500 MG/1
500 TABLET, FILM COATED ORAL EVERY 12 HOURS SCHEDULED
Qty: 17 TABLET | Refills: 0 | Status: SHIPPED | OUTPATIENT
Start: 2017-12-08 | End: 2017-12-17

## 2017-12-08 RX ORDER — DICYCLOMINE HYDROCHLORIDE 10 MG/1
10 CAPSULE ORAL
Qty: 40 CAPSULE | Refills: 0 | Status: SHIPPED | OUTPATIENT
Start: 2017-12-08 | End: 2018-06-11

## 2017-12-08 RX ORDER — METRONIDAZOLE 500 MG/1
500 TABLET ORAL EVERY 8 HOURS SCHEDULED
Status: DISCONTINUED | OUTPATIENT
Start: 2017-12-08 | End: 2017-12-08 | Stop reason: HOSPADM

## 2017-12-08 RX ADMIN — LEVOTHYROXINE SODIUM 88 MCG: 88 TABLET ORAL at 06:12

## 2017-12-08 RX ADMIN — DICYCLOMINE HYDROCHLORIDE 10 MG: 10 CAPSULE ORAL at 06:12

## 2017-12-08 RX ADMIN — DICYCLOMINE HYDROCHLORIDE 10 MG: 10 CAPSULE ORAL at 11:11

## 2017-12-08 RX ADMIN — BUPROPION HYDROCHLORIDE 450 MG: 150 TABLET, FILM COATED, EXTENDED RELEASE ORAL at 08:15

## 2017-12-08 RX ADMIN — METRONIDAZOLE 500 MG: 500 TABLET ORAL at 11:11

## 2017-12-08 RX ADMIN — ONDANSETRON 4 MG: 2 INJECTION INTRAMUSCULAR; INTRAVENOUS at 07:38

## 2017-12-08 RX ADMIN — METRONIDAZOLE 500 MG: 500 INJECTION, SOLUTION INTRAVENOUS at 04:43

## 2017-12-08 RX ADMIN — CIPROFLOXACIN 400 MG: 2 INJECTION, SOLUTION INTRAVENOUS at 06:12

## 2017-12-08 RX ADMIN — ENOXAPARIN SODIUM 40 MG: 40 INJECTION SUBCUTANEOUS at 08:15

## 2017-12-08 NOTE — DISCHARGE INSTRUCTIONS
Diverticulitis   WHAT YOU NEED TO KNOW:   Diverticulitis is a condition that causes small pockets along your intestine called diverticula to become inflamed or infected  This is caused by hard bowel movements, food, or bacteria that get stuck in the pockets  DISCHARGE INSTRUCTIONS:   Seek care immediately if:   · You have bowel movement or foul-smelling discharge leaking from your vagina or in your urine  · You have severe diarrhea  · You urinate less than usual or not at all  · You are not able to have a bowel movement  · You cannot stop vomiting  · You have severe abdominal pain, a fever, and your abdomen is larger than usual      · You have new or increased blood in your bowel movements  Contact your healthcare provider if:   · You have pain when you urinate  · Your symptoms get worse or do not go away  · You have questions or concerns about your condition or care  Medicines:   · Antibiotics  may be given to help prevent or treat a bacterial infection  · Prescription pain medicine  may be given  Ask your healthcare provider how to take this medicine safely  Some prescription pain medicines contain acetaminophen  Do not take other medicines that contain acetaminophen without talking to your healthcare provider  Too much acetaminophen may cause liver damage  Prescription pain medicine may cause constipation  Ask your healthcare provider how to prevent or treat constipation  · Take your medicine as directed  Contact your healthcare provider if you think your medicine is not helping or if you have side effects  Tell him or her if you are allergic to any medicine  Keep a list of the medicines, vitamins, and herbs you take  Include the amounts, and when and why you take them  Bring the list or the pill bottles to follow-up visits  Carry your medicine list with you in case of an emergency  Clear liquid diet:  A clear liquid diet includes any liquids that you can see through  Examples include water, ginger-vidal, cranberry or apple juice, frozen fruit ice, or broth  Stay on a clear liquid diet until your symptoms are gone, or as directed  Follow up with your healthcare provider as directed: You may need to return for a colonoscopy  When your symptoms are gone, you may need a low-fat, high-fiber diet to help prevent diverticulitis from developing again  Your healthcare provider or dietitian can help you create meal plans  Write down your questions so you remember to ask them during your visits  © 2017 [employer]  Information is for End User's use only and may not be sold, redistributed or otherwise used for commercial purposes  All illustrations and images included in CareNotes® are the copyrighted property of [employer]  or Justin Harris  The above information is an  only  It is not intended as medical advice for individual conditions or treatments  Talk to your doctor, nurse or pharmacist before following any medical regimen to see if it is safe and effective for you  Diverticulitis Diet   WHAT YOU NEED TO KNOW:   What is a diverticulitis diet? A diverticulitis diet includes foods that allow your intestines to rest while you have diverticulitis  Diverticulitis is a condition that causes diverticula (small pockets) along your intestine to become inflamed or infected  This is caused by hard bowel movement, food, or bacteria that get stuck in the pockets  What foods can I eat while I have diverticulitis? · A clear liquid diet may be recommended for 2 to 3 days  A clear liquid diet is made up of clear liquids and foods that are liquid at room temperature  Your healthcare provider will tell you when you can start eating solid foods   Examples of clear liquids include the following:     ¨ Water and clear juices (such as apple, cranberry, or grape), strained citrus juices or fruit punch    ¨ Coffee or tea (without cream or milk)    ¨ Clear sports drinks or soft drinks, such as ginger ale, lemon-lime soda, or club soda (no cola or root beer)    ¨ Clear broth, bouillon, or consommé    ¨ Plain popsicles (no popsicles with pureed fruit or fiber)    ¨ Flavored gelatin without fruit    · A low-fiber diet may be recommended until your symptoms improve  Your healthcare provider will tell you when you can slowly add high-fiber foods back into your diet  ¨ Cream of wheat and finely ground grits    ¨ White bread, white pasta, and white rice    ¨ Canned and well-cooked fruit without skins or seeds, and juice without pulp    ¨ Canned and well-cooked vegetables without skins or seeds, and vegetable juice    ¨ Cow's milk, lactose-free milk, soy milk, and rice milk    ¨ Yogurt, cottage cheese, and sherbet    ¨ Eggs, poultry (such as chicken and turkey), fish, and tender, ground, well-cooked beef     ¨ Tofu and smooth nut butters, such as peanut butter    ¨ Broth and strained soups made of low-fiber foods  What foods should I avoid while I have diverticulitis? Avoid foods that are high in fiber while you have symptoms of diverticulitis  Examples of high-fiber foods include the following:  · Whole grains and breads, and cereals made with whole grains    · Dried fruit, fresh fruit with skin, and fruit pulp    · Raw vegetables    · Cooked greens, such as spinach    · Tough meat and meat with gristle    · Legumes, such as sen beans and lentils  When should I contact my healthcare provider? · Your symptoms get worse or do not go away  · You have questions about the foods you should eat  · You have questions or concerns about your condition or care  CARE AGREEMENT:   You have the right to help plan your care  Learn about your health condition and how it may be treated  Discuss treatment options with your caregivers to decide what care you want to receive  You always have the right to refuse treatment   The above information is an educational aid only  It is not intended as medical advice for individual conditions or treatments  Talk to your doctor, nurse or pharmacist before following any medical regimen to see if it is safe and effective for you  © 2017 [employer] Information is for End User's use only and may not be sold, redistributed or otherwise used for commercial purposes  All illustrations and images included in CareNotes® are the copyrighted property of A [employer] M [employer] Inc  or Justin Harris

## 2017-12-09 NOTE — DISCHARGE SUMMARY
Discharge Summary - [facility] Internal Medicine    Patient Information: Eric Delaney 49 y o  female MRN: [id number]  Unit/Bed#: -01 Encounter: [id number]    Discharging Physician / Practitioner: Mickey Riley PA-C  PCP: Jose M Carrillo  Admission Date: 12/6/2017  Discharge Date: 12/09/17    Reason for Admission:  Sigmoid diverticulitis    Discharge Diagnoses:     Principal Problem:    Sigmoid diverticulitis  Active Problems:    Depression    Hypothyroid  Resolved Problems:    * No resolved hospital problems  *      Consultations During Hospital Stay:  · none    Procedures Performed:     · None    Significant Findings / Test Results:     · CT abdomen/pelvis-acute uncomplicated sigmoid diverticulitis    Incidental Findings:   · none     Test Results Pending at Discharge (will require follow up):   · none     Outpatient Tests Requested:  · Colonoscopy with gastroenterologist 4-6 weeks    Complications:  none    Hospital Course:     Eric Delaney is a 49 y o  female patient past medical history of depression, hypothyroid, diverticulosis who originally presented to the hospital on 12/6/2017 due to abdominal pain X 1 day  Patient stated a sudden onset of at generalized abdominal cramping which radiated to lower abdomen with severe nausea decreased appetite without episodes of vomiting and last bowel movement noted 1-2 days ago without diarrhea hematochezia or melena  Patient stated a significant history of diverticulitis was recently hospitalized 2014 and has not followed up with gastroenterologist since  Patient was brought to the emergency department from home via  for further evaluation lower abdominal pain with severe nausea  In the ED, patient was found to be hemodynamically stable with a leukocytosis of 15 74 and a CT abdomen/pelvis which noted acute uncomplicated sigmoid diverticulitis    Patient was administered ciprofloxacin and metronidazole and admitted to medical-surgical floor for a 2 day stay further evaluation of acute sigmoid diverticulitis  On the medical-surgical floor, patient remained hemodynamically stable and was initiated on a clear liquid diet for which patient tolerated fairly well admitted to small bowel movements watery without hematochezia or melena  Patient diet was then progressed to a low residue/low fat/GI diet for which she tolerated prior to discharge  Patient was transition from IV antibiotics to p  o  ciprofloxacin and metronidazole to complete 10 day course  Patient was also instructed to follow up with gastroenterologist outpatient and was provided a [facility] gastroenterology office number in case she was unable to contact  Patient was deemed medically stable for discharge after able to pass bowel movement and tolerated solid diet well and discharged home with  with prescriptions for Zofran, ciprofloxacin, metronidazole  All questions were answered to the best my abilities  Condition at Discharge: good     Discharge Day Visit / Exam:     Subjective:  Patient was seen and examined at the bedside with RN benito Yi Patient admitted to improved abdominal cramping with improved nausea/vomiting  Patient tolerated clear liquid diet and was able to tolerate solids  Patient denies fevers or chills, abdominal pain, nausea vomiting  bowel movement watery  Vitals: Blood Pressure: 92/54 (12/08/17 0740)  Pulse: 73 (12/08/17 0740)  Temperature: 98 3 °F (36 8 °C) (12/08/17 0740)  Temp Source: Oral (12/08/17 0740)  Respirations: 16 (12/08/17 0740)  Height: 5' 2" (157 5 cm) (12/06/17 1305)  Weight - Scale: 60 3 kg (133 lb) (12/06/17 1305)  SpO2: 98 % (12/08/17 0740)  Exam:   Physical Exam   Constitutional: She is oriented to person, place, and time  She appears well-developed  No distress  Sitting upright in bed fairly comfortable in no acute distress   HENT:   Head: Normocephalic and atraumatic     Mouth/Throat: Oropharynx is clear and moist  No oropharyngeal exudate  Eyes: Conjunctivae and EOM are normal  Pupils are equal, round, and reactive to light  Right eye exhibits no discharge  Left eye exhibits no discharge  No scleral icterus  Neck: Normal range of motion  Neck supple  No JVD present  No tracheal deviation present  No thyromegaly present  Cardiovascular: Normal rate, regular rhythm and intact distal pulses  Exam reveals no gallop and no friction rub  No murmur heard  DP pulses present bilaterally, no bilateral lower extremity edema   Pulmonary/Chest: Effort normal and breath sounds normal  No stridor  No respiratory distress  She has no wheezes  She has no rales  She exhibits no tenderness  Abdominal: Soft  Bowel sounds are normal  She exhibits distension  There is no tenderness  There is no rebound and no guarding  Musculoskeletal: Normal range of motion  She exhibits no edema, tenderness or deformity  Lymphadenopathy:     She has no cervical adenopathy  Neurological: She is alert and oriented to person, place, and time  She displays normal reflexes  No cranial nerve deficit  She exhibits normal muscle tone  Coordination normal    Skin: Skin is warm and dry  She is not diaphoretic  No erythema  Psychiatric: Her behavior is normal  Thought content normal        Discharge instructions/Information to patient and family:   See after visit summary for information provided to patient and family  Provisions for Follow-Up Care:  See after visit summary for information related to follow-up care and any pertinent home health orders  Disposition:     Home    For Discharges to [address] [facility]:   · Not Applicable to this Patient - Not Applicable to this Patient    Planned Readmission: none     Discharge Statement:  I spent 35 minutes discharging the patient  This time was spent on the day of discharge  I had direct contact with the patient on the day of discharge   Greater than 50% of the total time was spent examining patient, answering all patient questions, arranging and discussing plan of care with patient as well as directly providing post-discharge instructions  Additional time then spent on discharge activities  Discharge Medications:  See after visit summary for reconciled discharge medications provided to patient and family        ** Please Note: This note has been constructed using a voice recognition system **

## 2017-12-11 ENCOUNTER — GENERIC CONVERSION - ENCOUNTER (OUTPATIENT)
Dept: OTHER | Facility: OTHER | Age: 47
End: 2017-12-11

## 2017-12-11 NOTE — CASE MANAGEMENT
Notification of Discharge  This is a Notification of Discharge from our facility [address]  Please be advised that this patient has been discharge from our facility  Below you will find the admission and discharge date and time including the patients disposition  PRESENTATION DATE: 12/6/2017  1:41 PM  IP ADMISSION DATE: 12/6/17 1659  DISCHARGE DATE: 12/8/2017  2:01 PM  DISPOSITION: Admitted as an inpatient to this hospital    [facility] in the [employer] by Justin Harris for 2017  Network Utilization Review Department  Phone: [phone]; Fax [phone]  ATTENTION: The Network Utilization Review Department is now centralized for our 7 Facilities  Make a note that we have a new phone and fax numbers for our Department  Please call with any questions or concerns to [phone] and carefully follow the prompts so that you are directed to the right person  All voicemails are confidential  Fax any determinations, approvals, denials, and requests for initial or continue stay review clinical to [phone]  Due to HIGH CALL volume, it would be easier if you could please send faxed requests to expedite your requests and in part, help us provide discharge notifications faster

## 2018-01-05 ENCOUNTER — GENERIC CONVERSION - ENCOUNTER (OUTPATIENT)
Dept: FAMILY MEDICINE CLINIC | Facility: CLINIC | Age: 48
End: 2018-01-05

## 2018-01-10 NOTE — CONSULTS
History of Present Illness  Nick is a 48-year-old female referred to us by her primary doctor for evaluation of a lesion of the L cheek that has been there since the summer  Previously there was no lesion at all  It does not bleed, itch, ooze, or cause her pain  She has no history of skin cancer and does not follow with a dermatologist       Discussion/Summary    Please see history of present illness  A biopsy was done as described above   We will see her in 1 week for a recheck and suture removal       Signatures   Electronically signed by : Marjan Mascorro, [facility]; Nov 8 2016 10:49AM EST                       (Author)    Electronically signed by : CARMELLA Cardozo ; Nov 8 2016 12:07PM EST

## 2018-01-13 VITALS
SYSTOLIC BLOOD PRESSURE: 116 MMHG | RESPIRATION RATE: 16 BRPM | HEIGHT: 62 IN | DIASTOLIC BLOOD PRESSURE: 60 MMHG | WEIGHT: 132 LBS | BODY MASS INDEX: 24.29 KG/M2 | HEART RATE: 100 BPM

## 2018-01-13 NOTE — PROGRESS NOTES
Assessment    1  Encounter for routine gynecological examination (V72 31) (Z01 419)    Plan  Encounter for routine gynecological examination    · (1) THIN PREP PAP WITH IMAGING; Status: In Progress - Specimen/Data  Collected,Retrospective By Protocol Authorization;   Done: [id number]  Maturation index required? : No  : 07/26/2016  HPV? : if ASCUS   · Follow Up in 2 Years Evaluation and Treatment  Follow-up  Status: Hold For - Scheduling   Requested for: 00Iym8404          A/P  /1 gyn: we have done your pap and we will put a card in the mail with the results  you will be due again in 2 years  rto prn / 2 years    Colonoscopy is due after 01/07/2020  Mammogram is due after 08/12/2016 and she was given an order for this today  Pap test is due after 08/04/2016  Chief Complaint  Patient presents to the office today for yearly GYN and Pap test   Colonoscopy is due after 01/07/2020  Mammogram is due after 08/12/2016 and she was given an order for this today  Pap test is due after 08/04/2016  Tdap was last administered on 10/19/2010  History of Present Illness  HM, Adult Female: The patient is being seen for a gynecology evaluation  General Health:   Reproductive health:  she reports abnormal menses  Menstrual history:  age at menarche was 14  LMP: the last menstrual period was 07/26/2016  Recent menstrual periods: bleeding has been normal  The cycles have been regular  The duration of her recent periods has been regular  Screening:   HPI: Here today for annual pap  no issues  Menses are still regular  Review of Systems    Constitutional: No fever, no chills, feels well, no tiredness, no recent weight gain or weight loss  Cardiovascular: No complaints of slow heart rate, no fast heart rate, no chest pain, no palpitations, no leg claudication, no lower extremity edema  Respiratory: No complaints of shortness of breath, no wheezing, no cough, no SOB on exertion, no orthopnea, no PND  Gastrointestinal: No complaints of abdominal pain, no constipation, no nausea or vomiting, no diarrhea, no bloody stools  Genitourinary: No complaints of dysuria, no incontinence, no pelvic pain, no dysmenorrhea, no vaginal discharge or bleeding  Musculoskeletal: No complaints of arthralgias, no myalgias, no joint swelling or stiffness, no limb pain or swelling  Integumentary: No complaints of skin rash or lesions, no itching, no skin wounds, no breast pain or lump  Neurological: No complaints of headache, no confusion, no convulsions, no numbness, no dizziness or fainting, no tingling, no limb weakness, no difficulty walking  Psychiatric: Not suicidal, no sleep disturbance, no anxiety or depression, no change in personality, no emotional problems  Endocrine: No complaints of proptosis, no hot flashes, no muscle weakness, no deepening of the voice, no feelings of weakness  Active Problems    1  ADD (attention deficit disorder) without hyperactivity (314 00) (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)   2  Adjustment disorder (309 9) (F43 20)   3  Adjustment disorder with mixed anxiety and depressed mood (309 28) (F43 23)   4  Allergic rhinitis (477 9) (J30 9)   5  Anxiety disorder (300 00) (F41 9)   6  Carpal tunnel syndrome (354 0) (G56 00)   7  Colonoscopy (Fiberoptic) Screening   8  Dermatitis (692 9) (L30 9)   9  Diverticulitis of colon (562 11) (K57 32)   10  Dysuria (788 1) (R30 0)   11  Encounter for routine gynecological examination (V72 31) (Z01 419)   12  Encounter for screening for malignant neoplasm of cervix (V76 2) (Z12 4)   13  Encounter for screening mammogram for malignant neoplasm of breast (V76 12)    (Z12 31)   14  Flu vaccine need (V04 81) (Z23)   15  Hypothyroidism (244 9) (E03 9)   16  Low back pain (724 2) (M54 5)   17  Nervousness (799 21) (R45 0)   18   Symptoms involving urinary system (788 99) (R39 9)    Past Medical History    · History of Acute hemorrhagic cystitis (595 0) (N30 01)   · History of Ankle pain, unspecified laterality   · History of Bloating (787 3) (R14 0)   · History of Cholelithiasis with acute cholecystitis (574 00) (K80 00)   · History of Colon, diverticulosis (562 10) (K57 30)   · History of Diverticulitis of colon (562 11) (K57 32)   · History of Exposure to communicable disease (V01 9) (Z20 9)   · History of abdominal pain (V13 89) (K45 134)   · History of abdominal pain (V13 89) (J21 359)   · History of acute sinusitis (V12 69) (Z87 09)   · History of gastroenteritis (V12 79) (Z87 19)   · History of menorrhagia (V13 29) (Z87 42)   · History of pharyngitis (V12 69) (Z87 09)   · History of vaginitis (V13 29) (Z87 42)   · History of visual field defect (V12 49) (Z86 69)   · History of Limb swelling (729 81) (M79 89)   · History of Nervousness (799 21) (R45 0)   · History of Pain of finger, unspecified laterality (729 5) (M79 646)    Surgical History    · History of Cholecystectomy Laparoscopic    Family History  Mother    · Family history of Living and Healthy  Father    · Family history of Living and Healthy  Family History    · Family history of Alcohol Abuse   · Family history of Depression   · Family history of Family Health Status Of Father - Good   · Family history of Family Health Status Of Mother - Good   · Family history of Osteoporosis (V17 81)    Social History    · Being A Social Drinker   · beer and wine on the weekend   · Denied: History of Drug Use   · Has smoke detectors   · Never A Smoker   · No caffeine use   · Uses Safety Equipment - Seatbelts    Current Meds   1  ALPRAZolam 0 5 MG Oral Tablet; 0 5 TABS PO X 1; Therapy: 24VNQ5135 to (Evaluate:42Zwz0419)  Requested for: 32JPZ6600; Last   Rx:41Aoi1606 Ordered   2  BuPROPion HCl ER (XL) 150 MG Oral Tablet Extended Release 24 Hour; TAKE 3   TABLETS EVERY DAY;    Therapy: 83Qyl1259 to (Evaluate:28Apr2017)  Requested for: 38Ujq0376; Last   Rx:22Afc2964 Ordered   3  Econazole Nitrate 1 % External Cream; APPLY TWICE A DAY TO THE AFFECTED   AREAS; Therapy: 97NPO7247 to (Evaluate:65Wdr9266)  Requested for: 54PWB1097; Last   Rx:11Juo6782 Ordered   4  Fluticasone Propionate 50 MCG/ACT Nasal Suspension; 2 SPRAYS EACH NOSTRIL   ONCE DAILY; Therapy: 72UVT6703 to Recorded   5  Levothyroxine Sodium 88 MCG Oral Tablet; take 1 tablet every day; Therapy: 71FYS8202 to (Zeb Redd)  Requested for: 45Gux3759; Last   Rx:15Yje3977 Ordered   6  Mometasone Furoate 0 1 % External Cream; APPLY SPARINGLY TO AFFECTED AREAS   TWICE DAILY  (AM AND PM); Therapy: 07GAF9137 to (Last Rx:52Sqy8824)  Requested for: 13Kvj6050 Ordered   7  Mometasone Furoate 0 1 % External Solution; apply to affected area once daily; Therapy: 64Vzi5165 to (Evaluate:13Oct2015)  Requested for: 83ZZB4790; Last   Rx:72Zyu6021 Ordered   8  Uribel 118 MG Oral Capsule; Therapy: (Recorded:13Uoq2391) to Recorded    Allergies    1  Bactrim DS TABS   2  Serotonin Reuptake Inhibitors    3  No Known Environmental Allergies   4  No Known Food Allergies    Vitals   Recorded: 40ZPX3584 32:40KY   Systolic 230   Diastolic 72   Heart Rate 80   Respiration 16   LMP 93Ywf6973   Height 5 ft 2 in   Weight 147 lb 2 oz   BMI Calculated 26 91   BSA Calculated 1 68     Physical Exam    Constitutional   General appearance: No acute distress, well appearing and well nourished  Neck   Neck: Supple, symmetric, trachea midline, no masses  Thyroid: Normal, no thyromegaly  Pulmonary   Auscultation of lungs: Clear to auscultation  Cardiovascular   Auscultation of heart: Normal rate and rhythm, normal S1 and S2, no murmurs  Chest   Breasts: Normal, no dimpling or skin changes appreciated  Palpation of breasts and axillae: Normal, no masses palpated  Abdomen   Abdomen: Non-tender, no masses  Liver and spleen: No hepatomegaly or splenomegaly  Genitourinary   External genitalia and vagina: Normal, no lesions appreciated  Cervix: Normal, no lesions, Pap obtained    Examination of the cervix revealed normal findings  A Pap smear was performed  Uterus: Normal size, no tenderness, no masses  Adnexa/Parametria: Normal, no masses or tenderness  Lymphatic   Palpation of lymph nodes in neck: No lymphadenopathy  Musculoskeletal   Gait and station: Normal     Range of motion: Normal     Skin   Skin and subcutaneous tissue: Normal without rashes or lesions  Psychiatric   Orientation to person, place, and time: Normal     Mood and affect: Normal        Health Management  Colonoscopy (Fiberoptic) Screening   COLONOSCOPY; every 5 years; Last 00JFK1257; Next Due: 57MLL5574; Active  Encounter for routine gynecological examination   (every) THINPREP TIS PAP RFX HPV; every 2 years; Last 83Rbd4083; Next Due:  54Jxo7502; Overdue  Encounter for screening mammogram for malignant neoplasm of breast   Digital Bilateral Screening Mammogram With CAD; every 1 year; Last 61Ifx2638; Next  Due: 29Sph4153; Near Due  History of Nervousness   Digital Bilateral Screening Mammogram With CAD; every 1 year; Last 11Ibk7573; Next  Due: 25Jdt7227; Near Due    Signatures   Electronically signed by : AUSTEN Tavarez;  Aug  9 2016  2:00PM EST                       (Author)    Electronically signed by : Bryant Holder DO; Aug  9 2016  5:11PM EST

## 2018-01-17 ENCOUNTER — ALLSCRIPTS OFFICE VISIT (OUTPATIENT)
Dept: OTHER | Facility: OTHER | Age: 48
End: 2018-01-17

## 2018-01-18 NOTE — PROGRESS NOTES
Assessment   1  Never a smoker   2  Vaginal mass (625 8) (N89 9)   3  Diverticulitis of colon (562 11) (K57 32)    Discussion/Summary      Vaginal mass - reassurance part of normal anatomy, appears to be scar tissue from previous child birth tear/repairs  Is not HPV or HSV  Reassurance  as needed for PAP and can recheck area in August 2018 when are you are due  Possible side effects of new medications were reviewed with the patient/guardian today  The treatment plan was reviewed with the patient/guardian  The patient/guardian understands and agrees with the treatment plan      Chief Complaint   Pt presents to the office with c/o vaginal lesions, no irritation due 01/07/2020 due 10/09/2018 due 08/09/2018      History of Present Illness   HPI: Looked last night with red bumps, nontender, no discharge, no burning  Not sure how long they have been there because just noticed last night  partner since November  third flare in 12/2017, met with Dr Lynne Bella for laparoscopic procedures in future  Active Problems   1  ADD (attention deficit disorder) without hyperactivity (314 00) (F98 8)   2  Adjustment disorder (309 9) (F43 20)   3  Adjustment disorder with mixed anxiety and depressed mood (309 28) (F43 23)   4  Allergic rhinitis (477 9) (J30 9)   5  Anxiety disorder (300 00) (F41 9)   6  Carpal tunnel syndrome (354 0) (G56 00)   7  Chronic sinusitis (473 9) (J32 9)   8  Diverticulitis of colon (562 11) (K57 32)   9  Dysplastic nevi (216 9) (D23 9)   10  Granulomatous disease (288 1) (D71)   11  Hilar adenopathy (785 6) (R59 0)   12  Hypothyroidism (244 9) (E03 9)   13  Lung nodule (793 11) (R91 1)   14  Sarcoidosis (135) (D86 9)   15  Seborrheic keratosis (702 19) (L82 1)   16  Stress incontinence, female (625 6) (N39 3)    Past Medical History   1  History of Acute hemorrhagic cystitis (595 0) (N30 01)   2  History of Ankle pain, unspecified laterality   3  History of Bloating (787 3) (R14 0)   4   History of Cholelithiasis with acute cholecystitis (574 00) (K80 00)   5  History of Colon, diverticulosis (562 10) (K57 30)   6  History of Diverticulitis of colon (562 11) (K57 32)   7  History of Exposure to communicable disease (V01 9) (Z20 9)   8  History of abdominal pain (V13 89) (Z87 898)   9  History of abdominal pain (V13 89) (Z87 898)   10  History of acute sinusitis (V12 69) (Z87 09)   11  History of bronchitis (V12 69) (Z87 09)   12  History of gastroenteritis (V12 79) (Z87 19)   13  History of menorrhagia (V13 29) (Z87 42)   14  History of pharyngitis (V12 69) (Z87 09)   15  History of pneumonia (V12 61) (Z87 01)   16  History of vaginitis (V13 29) (Z87 42)   17  History of visual field defect (V12 49) (Z86 69)   18  History of Limb swelling (729 81) (M79 89)   19  History of Nervousness (799 21) (R45 0)   20  History of Pain of finger, unspecified laterality (729 5) (M79 646)  Active Problems And Past Medical History Reviewed: The active problems and past medical history were reviewed and updated today  Family History   Mother    1  Family history of Living and Healthy  Father    2  Family history of Living and Healthy  Family History    3  Family history of Alcohol Abuse   4  Family history of Depression   5  Family history of Family Health Status Of Father - Good   6  Family history of Family Health Status Of Mother - Good   7  Family history of Osteoporosis (V17 81)  Family History Reviewed: The family history was reviewed and updated today  Social History    · Being A Social Drinker   · beer and wine on the weekend   · Denied: History of Drug Use   · Has smoke detectors   · Never a smoker   · No caffeine use   · Uses Safety Equipment - Seatbelts  The social history was reviewed and updated today  The social history was reviewed and is unchanged  Surgical History   1  History of Bronchoscopy (Therapeutic) With Endobronchial Ultrasound Guidance   2   History of Cholecystectomy Laparoscopic   3  History of Liposuction   4  History of Oral Surgery Tooth Extraction [place] Tooth   5  History of Sinus Surgery  Surgical History Reviewed: The surgical history was reviewed and updated today  Current Meds    1  BuPROPion HCl ER (XL) 150 MG Oral Tablet Extended Release 24 Hour; TAKE 3     TABLETS EVERY DAY; Therapy: 67Xig7819 to (Yudith Kang)  Requested for: 65EWL3801; Last     ZO:59YHU6628 Ordered   2  Levothyroxine Sodium 88 MCG Oral Tablet; take 1 tablet every day; Therapy: 91WLG0260 to (Sterling Tom)  Requested for: 53Zfo5303; Last     Rx:02Nmj8021 Ordered   3  Mometasone Furoate 0 1 % External Solution; apply to affected area once daily; Therapy: 52Cti5045 to (Orbie Ahumada)  Requested for: 83PEK9595; Last     Rx:94Dob0176 Ordered   4  Uribel 118 MG Oral Capsule; Therapy: (Recorded:77Lrs1786) to Recorded     The medication list was reviewed and updated today  Allergies   1  Bactrim DS TABS   2  Serotonin Reuptake Inhibitors  3  No Known Environmental Allergies   4  No Known Food Allergies    Vitals    Recorded: 07CAQ1330 02:30PM   Temperature 97 7 F, Oral   Heart Rate 76, L Radial   Pulse Quality Normal, L Radial   Respiration Quality Normal   Respiration 16   Systolic 999   Diastolic 64   Height 5 ft 1 5 in   Weight 133 lb 8 0 oz   BMI Calculated 24 82   BSA Calculated 1 6     Physical Exam        Constitutional      General appearance: No acute distress, well appearing and well nourished  Pulmonary      Respiratory effort: No increased work of breathing or signs of respiratory distress  Auscultation of lungs: Clear to auscultation  Cardiovascular      Palpation of heart: Normal PMI, no thrills  Auscultation of heart: Normal rate and rhythm, normal S1 and S2, without murmurs  Skin      Skin and subcutaneous tissue: Normal without rashes or lesions   -- vagina at 6 o'clock slight inferior to opening two small pink papular lesions, nontender, appear to be scar tissue        Psychiatric      Orientation to person, place, and time: Normal        Mood and affect: Normal           Signatures    Electronically signed by : LEATHA Christianson; Jan 17 2018  3:08PM EST                       (Author)     Electronically signed by : CARMELLA Bentley ; Jan 17 2018  3:12PM EST                       (Author)

## 2018-01-23 VITALS
BODY MASS INDEX: 24.56 KG/M2 | TEMPERATURE: 97.7 F | SYSTOLIC BLOOD PRESSURE: 116 MMHG | RESPIRATION RATE: 16 BRPM | WEIGHT: 133.5 LBS | DIASTOLIC BLOOD PRESSURE: 64 MMHG | HEART RATE: 76 BPM | HEIGHT: 62 IN
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
## 2018-02-02 DIAGNOSIS — E03.9 HYPOTHYROIDISM, UNSPECIFIED TYPE: ICD-10-CM

## 2018-02-02 DIAGNOSIS — E03.9 HYPOTHYROIDISM, UNSPECIFIED TYPE: Primary | ICD-10-CM

## 2018-02-02 RX ORDER — LEVOTHYROXINE SODIUM 88 UG/1
88 TABLET ORAL DAILY
Qty: 30 TABLET | Refills: 0 | Status: SHIPPED | OUTPATIENT
Start: 2018-02-02 | End: 2018-10-23 | Stop reason: SDUPTHER

## 2018-02-02 RX ORDER — LEVOTHYROXINE SODIUM 88 UG/1
88 TABLET ORAL DAILY
Qty: 30 TABLET | Refills: 7 | Status: SHIPPED | OUTPATIENT
Start: 2018-02-02 | End: 2018-02-02 | Stop reason: SDUPTHER

## 2018-03-07 DIAGNOSIS — F41.9 ANXIETY: Primary | ICD-10-CM

## 2018-03-07 RX ORDER — BUPROPION HYDROCHLORIDE 150 MG/1
TABLET ORAL
Qty: 90 TABLET | Refills: 1 | Status: SHIPPED | OUTPATIENT
Start: 2018-03-07 | End: 2018-06-11

## 2018-03-27 ENCOUNTER — OFFICE VISIT (OUTPATIENT)
Dept: URGENT CARE | Facility: CLINIC | Age: 48
End: 2018-03-27
Payer: COMMERCIAL

## 2018-03-27 VITALS
HEIGHT: 62 IN | SYSTOLIC BLOOD PRESSURE: 133 MMHG | HEART RATE: 88 BPM | RESPIRATION RATE: 16 BRPM | WEIGHT: 139.4 LBS | DIASTOLIC BLOOD PRESSURE: 81 MMHG | BODY MASS INDEX: 25.65 KG/M2 | TEMPERATURE: 97.9 F | OXYGEN SATURATION: 100 %

## 2018-03-27 DIAGNOSIS — R30.0 DYSURIA: ICD-10-CM

## 2018-03-27 DIAGNOSIS — N30.01 ACUTE CYSTITIS WITH HEMATURIA: Primary | ICD-10-CM

## 2018-03-27 LAB
SL AMB  POCT GLUCOSE, UA: ABNORMAL
SL AMB LEUKOCYTE ESTERASE,UA: ABNORMAL
SL AMB POCT BILIRUBIN,UA: ABNORMAL
SL AMB POCT BLOOD,UA: ABNORMAL
SL AMB POCT CLARITY,UA: ABNORMAL
SL AMB POCT COLOR,UA: YELLOW
SL AMB POCT KETONES,UA: ABNORMAL
SL AMB POCT NITRITE,UA: ABNORMAL
SL AMB POCT PH,UA: 6.5
SL AMB POCT SPECIFIC GRAVITY,UA: ABNORMAL
SL AMB POCT URINE PROTEIN: ABNORMAL
SL AMB POCT UROBILINOGEN: 0.2

## 2018-03-27 PROCEDURE — 81002 URINALYSIS NONAUTO W/O SCOPE: CPT | Performed by: FAMILY MEDICINE

## 2018-03-27 PROCEDURE — 99213 OFFICE O/P EST LOW 20 MIN: CPT | Performed by: FAMILY MEDICINE

## 2018-03-27 RX ORDER — CIPROFLOXACIN 500 MG/1
500 TABLET, FILM COATED ORAL EVERY 12 HOURS SCHEDULED
Status: DISCONTINUED | OUTPATIENT
Start: 2018-03-27 | End: 2018-05-15

## 2018-03-27 NOTE — PROGRESS NOTES
[facility]        NAME: Meeta Naranjo is a 49 y o  female  : 1970    MRN: [id number]  DATE: 2018  TIME: 8:07 PM    Assessment and Plan   Acute cystitis with hematuria [N30 01]  1  Acute cystitis with hematuria  ciprofloxacin (CIPRO) tablet 500 mg   2  Dysuria  POCT urine dip    Urine culture         Patient Instructions       Follow up with PCP in 3-5 days  Proceed to  ER if symptoms worsen  Chief Complaint     Chief Complaint   Patient presents with    Urinary Tract Infection     burning, urinary frequency         History of Present Illness       Patient started  with UTI sx and states they have been worsening since  She complains of burning, frequency, urgency and insufficient voiding  She denies recent abx use, fever, chills, N/V, or back pain  Review of Systems   Review of Systems   Genitourinary: Positive for difficulty urinating, dysuria, frequency and urgency  Negative for flank pain and pelvic pain  Musculoskeletal: Negative for back pain  Current Medications       Current Outpatient Prescriptions:     buPROPion (FORFIVO XL) 450 MG 24 hr tablet, Take 450 mg by mouth every morning  , Disp: , Rfl:     buPROPion (WELLBUTRIN XL) 150 mg 24 hr tablet, TAKE 3 TABLETS EVERY DAY, Disp: 90 tablet, Rfl: 1    dicyclomine (BENTYL) 10 mg capsule, Take 1 capsule by mouth 4 (four) times a day (before meals and at bedtime) for 10 days, Disp: 40 capsule, Rfl: 0    levothyroxine (SYNTHROID) 88 mcg tablet, Take 1 tablet (88 mcg total) by mouth daily, Disp: 30 tablet, Rfl: 0    Meth-Hyo-M Bl-Na Phos-Ph Sal (URIBEL) 118 MG CAPS, Take 118 mg by mouth daily as needed  , Disp: , Rfl:     mometasone (ELOCON) 0 1 % cream, Apply 1 application topically daily  , Disp: , Rfl:     mometasone (ELOCON) 0 1 % lotion, Apply 1 application topically daily, Disp: , Rfl:     ondansetron (ZOFRAN) 4 mg tablet, Take 1 tablet by mouth every 8 (eight) hours as needed for nausea or vomiting for up to 10 days, Disp: 30 tablet, Rfl: 0    Current Facility-Administered Medications:     ciprofloxacin (CIPRO) tablet 500 mg, 500 mg, Oral, Q12H [facility], Kanika Moreno DO    Current Allergies     Allergies as of 03/27/2018 - Reviewed 03/27/2018   Allergen Reaction Noted    Bactrim [sulfamethoxazole-trimethoprim] Hives 09/12/2016    Serotonin reuptake inhibitors (ssris)  03/02/2012            The following portions of the patient's history were reviewed and updated as appropriate: allergies, current medications, past family history, past medical history, past social history, past surgical history and problem list      Past Medical History:   Diagnosis Date    Anxiety     Colitis     Depression     Disease of thyroid gland     Hypothyroidism    Diverticulitis     Diverticulosis     Frequent UTI     Pituitary tumor     Pneumonia 2004    x1     Psychiatric disorder     Sarcoidosis of lung ([facility] [address] )     Possible- has right "lung pain" and is being evaluated   Stress incontinence     Wears contact lenses     Wears glasses        Past Surgical History:   Procedure Laterality Date    BRONCHOSCOPY N/A 9/13/2016    Procedure: BRONCHOSCOPY FLEXIBLE ( FROZEN SECTION) ; Surgeon: Nathan Thomas MD;  [facility];  Service:     CHOLECYSTECTOMY      Laparoscopic    COLONOSCOPY      ESOPHAGOGASTRODUODENOSCOPY      ESSURE TUBAL LIGATION      ME BRONCHOSCOPY NEEDLE BX TRACHEA MAIN STEM&/BRON N/A 9/13/2016    Procedure: ENDOBRONCHIAL ULTRASOUND (EBUS);   Surgeon: Nathan Thomas MD;  [facility];  Service: Thoracic    ME CYSTOURETHROSCOPY N/A 11/21/2016    Procedure: CYSTOSCOPY;  Surgeon: Yifan Arana MD;  [facility];  Service: UroGynecology            [address] 3 1- 4 CM FACE,FACIAL Left 12/8/2016    Procedure: CHEEK SKIN LESION EXCISION/BIOPSY;  Surgeon: Beatris Boothe MD;  [facility];  Service: Plastics    ME RECMPL WND HEAD,FAC,HAND 2 6-7 5 CM Left 12/8/2016    Procedure: COMPLEX CLOSURE;  Surgeon: Suzie Robles MD;  [facility];  Service: Plastics    OK SLING OPER STRES INCONTINENCE N/A 11/21/2016    Procedure: Emily Jcarlos;  Surgeon: Etienne Lora MD;  [facility];  Service: UroGynecology           SINUS SURGERY         No family history on file  Medications have been verified  Objective   /81   Pulse 88   Temp 97 9 °F (36 6 °C) (Tympanic)   Resp 16   Ht 5' 2" (1 575 m)   Wt 63 2 kg (139 lb 6 4 oz)   SpO2 100%   BMI 25 50 kg/m²        Physical Exam     Physical Exam      AAO x 3, no acute distress  No CVA tenderness

## 2018-03-30 LAB
BACTERIA UR CULT: NORMAL
Lab: NO GROWTH

## 2018-05-15 ENCOUNTER — OFFICE VISIT (OUTPATIENT)
Dept: FAMILY MEDICINE CLINIC | Facility: CLINIC | Age: 48
End: 2018-05-15
Payer: COMMERCIAL

## 2018-05-15 VITALS
SYSTOLIC BLOOD PRESSURE: 118 MMHG | WEIGHT: 138 LBS | HEIGHT: 62 IN | RESPIRATION RATE: 14 BRPM | HEART RATE: 60 BPM | DIASTOLIC BLOOD PRESSURE: 72 MMHG | BODY MASS INDEX: 25.4 KG/M2

## 2018-05-15 DIAGNOSIS — K57.32 DIVERTICULITIS OF COLON: ICD-10-CM

## 2018-05-15 DIAGNOSIS — G56.03 BILATERAL CARPAL TUNNEL SYNDROME: ICD-10-CM

## 2018-05-15 DIAGNOSIS — G47.00 INSOMNIA, UNSPECIFIED TYPE: ICD-10-CM

## 2018-05-15 DIAGNOSIS — N95.1 MENOPAUSAL SYMPTOM: Primary | ICD-10-CM

## 2018-05-15 PROBLEM — R10.11 CHRONIC RIGHT UPPER QUADRANT PAIN: Status: ACTIVE | Noted: 2017-06-30

## 2018-05-15 PROBLEM — G89.29 CHRONIC RIGHT UPPER QUADRANT PAIN: Status: ACTIVE | Noted: 2017-06-30

## 2018-05-15 PROBLEM — N89.8 VAGINAL MASS: Status: ACTIVE | Noted: 2018-01-17

## 2018-05-15 PROBLEM — Z00.00 HEALTH CARE MAINTENANCE: Status: ACTIVE | Noted: 2018-05-15

## 2018-05-15 PROCEDURE — 99214 OFFICE O/P EST MOD 30 MIN: CPT | Performed by: NURSE PRACTITIONER

## 2018-05-15 PROCEDURE — 3008F BODY MASS INDEX DOCD: CPT | Performed by: NURSE PRACTITIONER

## 2018-05-15 RX ORDER — TRAZODONE HYDROCHLORIDE 50 MG/1
50 TABLET ORAL
Qty: 60 TABLET | Refills: 5 | Status: SHIPPED | OUTPATIENT
Start: 2018-05-15 | End: 2019-05-08 | Stop reason: SDUPTHER

## 2018-05-15 NOTE — PROGRESS NOTES
Assessment/Plan:      1  Menopausal symptom  We discussed the symptoms and definition of menopause  We have discussed the possible negative impact of hormonal therapy and have decided not to pursue this at this time  Comfort measures for symptoms were discussed including a cooling pillow to help sleep at night  2  Insomnia, unspecified type    - traZODone (DESYREL) 50 mg tablet; Take 1 tablet (50 mg total) by mouth daily at bedtime  Dispense: 60 tablet; Refill: 5    3  Diverticulitis:   pt follows  with [facility]  Is being evaluated for elective resection of the bowel  4  Carpal tunnel Syndrome:  Patient has history of carpal tunnel complaints  We have ordered splints to be worn at night  Subjective:      Patient ID: Nano Naranjo is a 49 y o  female  Patient reports today with complaint of menopausal symptoms  She reports having a normal period in November, but in February it was light and minimal but lasted longer than normal  She usually experiences a period every 30 days  She reports her mother was about 48/49 at the onset of menopause  In the last month she reports putting on 8lbs with no change in diet  She also reports experiencing hot flashes starting six weeks ago, difficulty sleeping,  increased anxiety, and difficulty remembering certain things  She also reports that her hands fall asleep during sleep which wake her in the middle of the night  She reports having a history of carpal tunnel complaints which she did not follow up on per provider suggestion  OBJECTIVE  Past Medical History:   Diagnosis Date    Allergic     Anxiety     Colitis     Depression     Disease of thyroid gland     Hypothyroidism    Diverticulitis     Diverticulosis     Frequent UTI     Pituitary tumor     Pneumonia 2004    x1     Psychiatric disorder     Sarcoidosis of lung (HCC)     Possible- has right "lung pain" and is being evaluated      Stress incontinence     Wears contact lenses  Wears glasses      @PSH    Wt Readings from Last 3 Encounters:   05/15/18 62 6 kg (138 lb)   03/27/18 63 2 kg (139 lb 6 4 oz)   01/17/18 60 6 kg (133 lb 8 oz)     BP Readings from Last 3 Encounters:   05/15/18 118/72   03/27/18 133/81   01/17/18 116/64     Pulse Readings from Last 3 Encounters:   05/15/18 60   03/27/18 88   01/17/18 76     BMI Readings from Last 3 Encounters:   05/15/18 25 24 kg/m²   03/27/18 25 50 kg/m²   01/17/18 24 82 kg/m²        Physical Exam   Constitutional: She is oriented to person, place, and time  She appears well-developed and well-nourished  HENT:   Head: Normocephalic  Neck: Neck supple  No tracheal deviation present  No thyromegaly present  Cardiovascular: Normal rate, regular rhythm and normal heart sounds  Pulmonary/Chest: Breath sounds normal  No respiratory distress  She has no wheezes  Musculoskeletal: Normal range of motion  Neurological: She is alert and oriented to person, place, and time  Skin: Skin is warm and dry  Psychiatric: She has a normal mood and affect   Her behavior is normal  Judgment and thought content normal

## 2018-05-21 ENCOUNTER — TELEPHONE (OUTPATIENT)
Dept: FAMILY MEDICINE CLINIC | Facility: CLINIC | Age: 48
End: 2018-05-21

## 2018-05-21 NOTE — TELEPHONE ENCOUNTER
I will call  Jori this afternoon and the pharmacy to see if it went thru   But you do have to enter a new rx

## 2018-05-21 NOTE — TELEPHONE ENCOUNTER
I searched for the form from [facility] that was filled out, I can't locate it anywhere  So I called them, called in the RX, gave them the DX, and DX code 1 tab 3x daily #90  ( same rx as last ordered ) I told them the patient will be out of meds today and to please expedite, they said they would and gave me a reference # [id number]   The # I called was [phone]

## 2018-05-21 NOTE — TELEPHONE ENCOUNTER
Patient called, said the pharmacy will not refill the welburtin for her  They told her the insurance is not allowing it, that it is in clinical review  She only has 2 pills left for today and she takes 3 per day  She will be out of meds after today    Davina Romero's # [phone] she said leave a message on her cell

## 2018-06-11 ENCOUNTER — ANESTHESIA EVENT (OUTPATIENT)
Dept: PERIOP | Facility: HOSPITAL | Age: 48
DRG: 331 | End: 2018-06-11
Payer: COMMERCIAL

## 2018-06-11 NOTE — PRE-PROCEDURE INSTRUCTIONS
Pre-Surgery Instructions:   Medication Instructions    BUPROPION HBR ER PO Instructed patient per Anesthesia Guidelines   levothyroxine (SYNTHROID) 88 mcg tablet Instructed patient per Anesthesia Guidelines   Meth-Hyo-M Bl-Na Phos-Ph Sal (URIBEL) 118 MG CAPS Instructed patient per Anesthesia Guidelines   mometasone (ELOCON) 0 1 % cream Instructed patient per Anesthesia Guidelines   mometasone (ELOCON) 0 1 % lotion Instructed patient per Anesthesia Guidelines   traZODone (DESYREL) 50 mg tablet Instructed patient per Anesthesia Guidelines  Alpha adrenergic antagonist Med Class     Continue to take this medication on your normal schedule  If this is an oral medication and you take it in the morning, then you may take this medicine with a sip of water  Antidepressant Med Class     Continue to take this medication on your normal schedule  If this is an oral medication and you take it in the morning, then you may take this medicine with a sip of water  Thyroxine Med Class     Continue to take this medication on your normal schedule  If this is an oral medication and you take it in the morning, then you may take this medicine with a sip of water

## 2018-06-11 NOTE — PRE-PROCEDURE INSTRUCTIONS
Pre-Surgery Instructions:   Medication Instructions    BUPROPION HBR ER PO Instructed patient per Anesthesia Guidelines   levothyroxine (SYNTHROID) 88 mcg tablet Instructed patient per Anesthesia Guidelines   Meth-Hyo-M Bl-Na Phos-Ph Sal (URIBEL) 118 MG CAPS Instructed patient per Anesthesia Guidelines   mometasone (ELOCON) 0 1 % cream Instructed patient per Anesthesia Guidelines   mometasone (ELOCON) 0 1 % lotion Instructed patient per Anesthesia Guidelines   traZODone (DESYREL) 50 mg tablet Instructed patient per Anesthesia Guidelines

## 2018-06-21 NOTE — ANESTHESIA PREPROCEDURE EVALUATION
Review of Systems/Medical History          Cardiovascular   Pulmonary  No pneumonia,   Comment: Sarcoidosis     GI/Hepatic            Endo/Other  History of thyroid disease , hypothyroidism,      GYN       Hematology   Musculoskeletal       Neurology      Comment: Hx  Pituitary tumor  No surgery  Followed By [facility]   Anxiety, Depression ,              Physical Exam    Airway    Mallampati score: I  TM Distance: >3 FB  Neck ROM: full     Dental   No notable dental hx     Cardiovascular      Pulmonary      Other Findings        Anesthesia Plan  ASA Score- 2     Anesthesia Type- general with ASA Monitors  Additional Monitors:   Airway Plan: ETT  Plan Factors-Patient not instructed to abstain from smoking on day of procedure  Patient did not smoke on day of surgery  Induction- intravenous  Postoperative Plan-     Informed Consent- Anesthetic plan and risks discussed with patient, spouse and mother  I personally reviewed this patient with the CRNA  Discussed and agreed on the Anesthesia Plan with the CRNA  Tamar Carlos

## 2018-06-22 ENCOUNTER — HOSPITAL ENCOUNTER (INPATIENT)
Facility: HOSPITAL | Age: 48
LOS: 3 days | Discharge: HOME | DRG: 331 | End: 2018-06-25
Attending: COLON & RECTAL SURGERY | Admitting: COLON & RECTAL SURGERY
Payer: COMMERCIAL

## 2018-06-22 ENCOUNTER — ANESTHESIA (OUTPATIENT)
Dept: PERIOP | Facility: HOSPITAL | Age: 48
DRG: 331 | End: 2018-06-22
Payer: COMMERCIAL

## 2018-06-22 DIAGNOSIS — K57.32 DIVERTICULITIS OF LARGE INTESTINE WITHOUT PERFORATION OR ABSCESS WITHOUT BLEEDING: ICD-10-CM

## 2018-06-22 LAB
ABO GROUP BLD: NORMAL
BLD GP AB SCN SERPL QL: NEGATIVE
GLUCOSE SERPL-MCNC: 120 MG/DL (ref 65–140)
RH BLD: POSITIVE
SPECIMEN EXPIRATION DATE: NORMAL

## 2018-06-22 PROCEDURE — 82948 REAGENT STRIP/BLOOD GLUCOSE: CPT

## 2018-06-22 PROCEDURE — 86901 BLOOD TYPING SEROLOGIC RH(D): CPT | Performed by: COLON & RECTAL SURGERY

## 2018-06-22 PROCEDURE — 88307 TISSUE EXAM BY PATHOLOGIST: CPT | Performed by: PATHOLOGY

## 2018-06-22 PROCEDURE — 0DTN0ZZ RESECTION OF SIGMOID COLON, OPEN APPROACH: ICD-10-PCS | Performed by: COLON & RECTAL SURGERY

## 2018-06-22 PROCEDURE — 86850 RBC ANTIBODY SCREEN: CPT | Performed by: COLON & RECTAL SURGERY

## 2018-06-22 PROCEDURE — 0DJD8ZZ INSPECTION OF LOWER INTESTINAL TRACT, VIA NATURAL OR ARTIFICIAL OPENING ENDOSCOPIC: ICD-10-PCS | Performed by: COLON & RECTAL SURGERY

## 2018-06-22 PROCEDURE — 86900 BLOOD TYPING SEROLOGIC ABO: CPT | Performed by: COLON & RECTAL SURGERY

## 2018-06-22 RX ORDER — PROPOFOL 10 MG/ML
INJECTION, EMULSION INTRAVENOUS AS NEEDED
Status: DISCONTINUED | OUTPATIENT
Start: 2018-06-22 | End: 2018-06-22 | Stop reason: SURG

## 2018-06-22 RX ORDER — FENTANYL CITRATE 50 UG/ML
INJECTION, SOLUTION INTRAMUSCULAR; INTRAVENOUS AS NEEDED
Status: DISCONTINUED | OUTPATIENT
Start: 2018-06-22 | End: 2018-06-22 | Stop reason: SURG

## 2018-06-22 RX ORDER — MOMETASONE FUROATE 1 MG/G
1 CREAM TOPICAL DAILY
Status: DISCONTINUED | OUTPATIENT
Start: 2018-06-22 | End: 2018-06-25 | Stop reason: HOSPADM

## 2018-06-22 RX ORDER — HEPARIN SODIUM 5000 [USP'U]/ML
5000 INJECTION, SOLUTION INTRAVENOUS; SUBCUTANEOUS ONCE
Status: COMPLETED | OUTPATIENT
Start: 2018-06-22 | End: 2018-06-22

## 2018-06-22 RX ORDER — MEPERIDINE HYDROCHLORIDE 25 MG/ML
12.5 INJECTION INTRAMUSCULAR; INTRAVENOUS; SUBCUTANEOUS
Status: DISCONTINUED | OUTPATIENT
Start: 2018-06-22 | End: 2018-06-22 | Stop reason: HOSPADM

## 2018-06-22 RX ORDER — ONDANSETRON 2 MG/ML
4 INJECTION INTRAMUSCULAR; INTRAVENOUS EVERY 6 HOURS PRN
Status: DISCONTINUED | OUTPATIENT
Start: 2018-06-22 | End: 2018-06-25 | Stop reason: HOSPADM

## 2018-06-22 RX ORDER — MIDAZOLAM HYDROCHLORIDE 1 MG/ML
INJECTION INTRAMUSCULAR; INTRAVENOUS AS NEEDED
Status: DISCONTINUED | OUTPATIENT
Start: 2018-06-22 | End: 2018-06-22 | Stop reason: SURG

## 2018-06-22 RX ORDER — TRAZODONE HYDROCHLORIDE 50 MG/1
50 TABLET ORAL
Status: DISCONTINUED | OUTPATIENT
Start: 2018-06-22 | End: 2018-06-25 | Stop reason: HOSPADM

## 2018-06-22 RX ORDER — OXYCODONE HYDROCHLORIDE AND ACETAMINOPHEN 5; 325 MG/1; MG/1
2 TABLET ORAL EVERY 4 HOURS PRN
Status: DISCONTINUED | OUTPATIENT
Start: 2018-06-22 | End: 2018-06-25 | Stop reason: HOSPADM

## 2018-06-22 RX ORDER — DEXTROSE MONOHYDRATE AND SODIUM CHLORIDE 5; .45 G/100ML; G/100ML
84 INJECTION, SOLUTION INTRAVENOUS CONTINUOUS
Status: DISCONTINUED | OUTPATIENT
Start: 2018-06-22 | End: 2018-06-23

## 2018-06-22 RX ORDER — ROCURONIUM BROMIDE 10 MG/ML
INJECTION, SOLUTION INTRAVENOUS AS NEEDED
Status: DISCONTINUED | OUTPATIENT
Start: 2018-06-22 | End: 2018-06-22 | Stop reason: SURG

## 2018-06-22 RX ORDER — SODIUM CHLORIDE 9 MG/ML
INJECTION, SOLUTION INTRAVENOUS CONTINUOUS PRN
Status: DISCONTINUED | OUTPATIENT
Start: 2018-06-22 | End: 2018-06-22 | Stop reason: SURG

## 2018-06-22 RX ORDER — METHENAMINE, SODIUM PHOSPHATE, MONOBASIC, MONOHYDRATE, PHENYL SALICYLATE, METHYLENE BLUE, AND HYOSCYAMINE SULFATE 120; 40.8; 36; 10; .12 MG/1; MG/1; MG/1; MG/1; MG/1
118 CAPSULE ORAL DAILY PRN
Status: DISCONTINUED | OUTPATIENT
Start: 2018-06-22 | End: 2018-06-22

## 2018-06-22 RX ORDER — BUPROPION HYDROCHLORIDE 100 MG/1
100 TABLET ORAL 3 TIMES DAILY
Status: DISCONTINUED | OUTPATIENT
Start: 2018-06-22 | End: 2018-06-22

## 2018-06-22 RX ORDER — GLYCOPYRROLATE 0.2 MG/ML
INJECTION INTRAMUSCULAR; INTRAVENOUS AS NEEDED
Status: DISCONTINUED | OUTPATIENT
Start: 2018-06-22 | End: 2018-06-22 | Stop reason: SURG

## 2018-06-22 RX ORDER — MAGNESIUM HYDROXIDE 1200 MG/15ML
LIQUID ORAL AS NEEDED
Status: DISCONTINUED | OUTPATIENT
Start: 2018-06-22 | End: 2018-06-22 | Stop reason: HOSPADM

## 2018-06-22 RX ORDER — SODIUM CHLORIDE, SODIUM LACTATE, POTASSIUM CHLORIDE, CALCIUM CHLORIDE 600; 310; 30; 20 MG/100ML; MG/100ML; MG/100ML; MG/100ML
50 INJECTION, SOLUTION INTRAVENOUS CONTINUOUS
Status: DISCONTINUED | OUTPATIENT
Start: 2018-06-22 | End: 2018-06-22

## 2018-06-22 RX ORDER — ONDANSETRON 2 MG/ML
INJECTION INTRAMUSCULAR; INTRAVENOUS AS NEEDED
Status: DISCONTINUED | OUTPATIENT
Start: 2018-06-22 | End: 2018-06-22 | Stop reason: SURG

## 2018-06-22 RX ORDER — DIPHENHYDRAMINE HYDROCHLORIDE 50 MG/ML
12.5 INJECTION INTRAMUSCULAR; INTRAVENOUS ONCE AS NEEDED
Status: DISCONTINUED | OUTPATIENT
Start: 2018-06-22 | End: 2018-06-22 | Stop reason: HOSPADM

## 2018-06-22 RX ORDER — MORPHINE SULFATE 2 MG/ML
2 INJECTION, SOLUTION INTRAMUSCULAR; INTRAVENOUS EVERY 4 HOURS PRN
Status: DISCONTINUED | OUTPATIENT
Start: 2018-06-22 | End: 2018-06-22

## 2018-06-22 RX ORDER — ONDANSETRON 2 MG/ML
4 INJECTION INTRAMUSCULAR; INTRAVENOUS EVERY 4 HOURS PRN
Status: COMPLETED | OUTPATIENT
Start: 2018-06-22 | End: 2018-06-22

## 2018-06-22 RX ORDER — EPHEDRINE SULFATE 50 MG/ML
INJECTION, SOLUTION INTRAVENOUS AS NEEDED
Status: DISCONTINUED | OUTPATIENT
Start: 2018-06-22 | End: 2018-06-22 | Stop reason: SURG

## 2018-06-22 RX ORDER — LEVOTHYROXINE SODIUM 88 UG/1
88 TABLET ORAL DAILY
Status: DISCONTINUED | OUTPATIENT
Start: 2018-06-23 | End: 2018-06-25 | Stop reason: HOSPADM

## 2018-06-22 RX ORDER — FENTANYL CITRATE/PF 50 MCG/ML
25 SYRINGE (ML) INJECTION
Status: DISCONTINUED | OUTPATIENT
Start: 2018-06-22 | End: 2018-06-22 | Stop reason: HOSPADM

## 2018-06-22 RX ORDER — BUPROPION HYDROCHLORIDE 150 MG/1
450 TABLET ORAL DAILY
Status: DISCONTINUED | OUTPATIENT
Start: 2018-06-23 | End: 2018-06-25 | Stop reason: HOSPADM

## 2018-06-22 RX ORDER — OXYCODONE HYDROCHLORIDE AND ACETAMINOPHEN 5; 325 MG/1; MG/1
1 TABLET ORAL EVERY 4 HOURS PRN
Status: DISCONTINUED | OUTPATIENT
Start: 2018-06-22 | End: 2018-06-25 | Stop reason: HOSPADM

## 2018-06-22 RX ORDER — LORAZEPAM 2 MG/ML
0.5 INJECTION INTRAMUSCULAR DAILY PRN
Status: DISCONTINUED | OUTPATIENT
Start: 2018-06-22 | End: 2018-06-25 | Stop reason: HOSPADM

## 2018-06-22 RX ORDER — OXYCODONE HYDROCHLORIDE AND ACETAMINOPHEN 5; 325 MG/1; MG/1
2 TABLET ORAL EVERY 6 HOURS PRN
Status: DISCONTINUED | OUTPATIENT
Start: 2018-06-22 | End: 2018-06-22

## 2018-06-22 RX ADMIN — SODIUM CHLORIDE, SODIUM LACTATE, POTASSIUM CHLORIDE, AND CALCIUM CHLORIDE: .6; .31; .03; .02 INJECTION, SOLUTION INTRAVENOUS at 07:34

## 2018-06-22 RX ADMIN — ROCURONIUM BROMIDE 10 MG: 10 INJECTION INTRAVENOUS at 08:40

## 2018-06-22 RX ADMIN — ONDANSETRON 4 MG: 2 INJECTION INTRAMUSCULAR; INTRAVENOUS at 10:23

## 2018-06-22 RX ADMIN — HYDROMORPHONE HYDROCHLORIDE 0.5 MG: 1 INJECTION, SOLUTION INTRAMUSCULAR; INTRAVENOUS; SUBCUTANEOUS at 10:19

## 2018-06-22 RX ADMIN — PROPOFOL 180 MG: 10 INJECTION, EMULSION INTRAVENOUS at 07:57

## 2018-06-22 RX ADMIN — FENTANYL CITRATE 25 MCG: 50 INJECTION INTRAMUSCULAR; INTRAVENOUS at 10:55

## 2018-06-22 RX ADMIN — TRAZODONE HYDROCHLORIDE 50 MG: 50 TABLET ORAL at 21:56

## 2018-06-22 RX ADMIN — CEFAZOLIN SODIUM 1000 MG: 1 SOLUTION INTRAVENOUS at 08:16

## 2018-06-22 RX ADMIN — EPHEDRINE SULFATE 10 MG: 50 INJECTION, SOLUTION INTRAMUSCULAR; INTRAVENOUS; SUBCUTANEOUS at 09:14

## 2018-06-22 RX ADMIN — NEOSTIGMINE METHYLSULFATE 4 MG: 1 INJECTION, SOLUTION INTRAMUSCULAR; INTRAVENOUS; SUBCUTANEOUS at 10:16

## 2018-06-22 RX ADMIN — HYDROMORPHONE HYDROCHLORIDE 0.5 MG: 1 INJECTION, SOLUTION INTRAMUSCULAR; INTRAVENOUS; SUBCUTANEOUS at 21:56

## 2018-06-22 RX ADMIN — ROCURONIUM BROMIDE 10 MG: 10 INJECTION INTRAVENOUS at 08:22

## 2018-06-22 RX ADMIN — FENTANYL CITRATE 25 MCG: 50 INJECTION INTRAMUSCULAR; INTRAVENOUS at 11:21

## 2018-06-22 RX ADMIN — FENTANYL CITRATE 25 MCG: 50 INJECTION INTRAMUSCULAR; INTRAVENOUS at 10:40

## 2018-06-22 RX ADMIN — DEXTROSE AND SODIUM CHLORIDE 125 ML/HR: 5; .45 INJECTION, SOLUTION INTRAVENOUS at 16:10

## 2018-06-22 RX ADMIN — ONDANSETRON 4 MG: 2 INJECTION, SOLUTION INTRAMUSCULAR; INTRAVENOUS at 10:39

## 2018-06-22 RX ADMIN — FENTANYL CITRATE 50 MCG: 50 INJECTION, SOLUTION INTRAMUSCULAR; INTRAVENOUS at 08:27

## 2018-06-22 RX ADMIN — GLYCOPYRROLATE 0.8 MG: 0.2 INJECTION, SOLUTION INTRAMUSCULAR; INTRAVENOUS at 10:16

## 2018-06-22 RX ADMIN — ONDANSETRON 4 MG: 2 INJECTION INTRAMUSCULAR; INTRAVENOUS at 07:43

## 2018-06-22 RX ADMIN — ROCURONIUM BROMIDE 40 MG: 10 INJECTION INTRAVENOUS at 07:57

## 2018-06-22 RX ADMIN — MIDAZOLAM 2 MG: 1 INJECTION INTRAMUSCULAR; INTRAVENOUS at 07:43

## 2018-06-22 RX ADMIN — OXYCODONE HYDROCHLORIDE AND ACETAMINOPHEN 2 TABLET: 5; 325 TABLET ORAL at 20:59

## 2018-06-22 RX ADMIN — LIDOCAINE HYDROCHLORIDE 60 MG: 20 INJECTION, SOLUTION INTRAVENOUS at 07:57

## 2018-06-22 RX ADMIN — FENTANYL CITRATE 100 MCG: 50 INJECTION, SOLUTION INTRAMUSCULAR; INTRAVENOUS at 07:57

## 2018-06-22 RX ADMIN — OXYCODONE HYDROCHLORIDE AND ACETAMINOPHEN 2 TABLET: 5; 325 TABLET ORAL at 16:10

## 2018-06-22 RX ADMIN — HYDROMORPHONE HYDROCHLORIDE 0.5 MG: 1 INJECTION, SOLUTION INTRAMUSCULAR; INTRAVENOUS; SUBCUTANEOUS at 13:55

## 2018-06-22 RX ADMIN — DEXTROSE AND SODIUM CHLORIDE 125 ML/HR: 5; .45 INJECTION, SOLUTION INTRAVENOUS at 11:41

## 2018-06-22 RX ADMIN — FENTANYL CITRATE 25 MCG: 50 INJECTION INTRAMUSCULAR; INTRAVENOUS at 11:04

## 2018-06-22 RX ADMIN — HYDROMORPHONE HYDROCHLORIDE 0.5 MG: 1 INJECTION, SOLUTION INTRAMUSCULAR; INTRAVENOUS; SUBCUTANEOUS at 10:25

## 2018-06-22 RX ADMIN — SODIUM CHLORIDE, SODIUM LACTATE, POTASSIUM CHLORIDE, AND CALCIUM CHLORIDE 50 ML/HR: .6; .31; .03; .02 INJECTION, SOLUTION INTRAVENOUS at 07:46

## 2018-06-22 RX ADMIN — SODIUM CHLORIDE: 0.9 INJECTION, SOLUTION INTRAVENOUS at 08:02

## 2018-06-22 RX ADMIN — HYDROMORPHONE HYDROCHLORIDE 0.5 MG: 1 INJECTION, SOLUTION INTRAMUSCULAR; INTRAVENOUS; SUBCUTANEOUS at 18:16

## 2018-06-22 RX ADMIN — METRONIDAZOLE 500 MG: 500 INJECTION, SOLUTION INTRAVENOUS at 08:17

## 2018-06-22 RX ADMIN — HYDROMORPHONE HYDROCHLORIDE 0.4 MG: 1 INJECTION, SOLUTION INTRAMUSCULAR; INTRAVENOUS; SUBCUTANEOUS at 11:38

## 2018-06-22 RX ADMIN — FENTANYL CITRATE 50 MCG: 50 INJECTION, SOLUTION INTRAMUSCULAR; INTRAVENOUS at 09:30

## 2018-06-22 RX ADMIN — HEPARIN SODIUM 5000 UNITS: 5000 INJECTION, SOLUTION INTRAVENOUS; SUBCUTANEOUS at 21:00

## 2018-06-22 RX ADMIN — HEPARIN SODIUM 5000 UNITS: 5000 INJECTION, SOLUTION INTRAVENOUS; SUBCUTANEOUS at 07:15

## 2018-06-22 NOTE — SOCIAL WORK
CM met with pt and pt aware cm role at discharge  Pt lives in a house with her boyfriend   Prior to admission pt was independent all ADL"S   Pt denies any DME,s,  VNA or , SNF  Pt has  No history of mental health or [address] [place] [address] rehab in the past  Pt has a history of depression and sees a psychiatrist    Pt uses [facility]    When medically clear pt mom  will transport home  CM reviewed d/c planning process including the following: identifying help at home, patient preference for d/c planning needs, Discharge Lounge, [facility] Meds to Bed program, availability of treatment team to discuss questions or concerns patient and/or family may have regarding understanding medications and recognizing signs and symptoms once discharged   CM also encouraged patient to follow up with all recommended appointments after discharge  Patient advised of importance for patient and family to participate in managing patients medical well being    Discharge checklist discussed with patient and family

## 2018-06-22 NOTE — ANESTHESIA POSTPROCEDURE EVALUATION
Post-Op Assessment Note      CV Status:  Stable    Mental Status:  Alert and awake    Hydration Status:  Euvolemic    PONV Controlled:  Controlled    Airway Patency:  Patent    Post Op Vitals Reviewed: Yes          Staff: CRNA           BP   123/79   Temp   97 2   Pulse  87   Resp 14   SpO2 98%

## 2018-06-22 NOTE — PROGRESS NOTES
Post OP Check - Colorectal Surgery   Bebo Jackson 49 y o  female MRN: [id number]  Unit/Bed#: [facility] 807-01 Encounter: [id number]    Assessment:  Pt is a 53y  o  F w/ diverticulosis s/p robotic sigmoid resection    Plan:  Clears  IVF  Home meds  PRN pain control  IS/OOB/ambulate  SQH/SCDs    Subjective/Objective   Chief Complaint:     Subjective: Having pain, refuses morphine, changed to prn dilaudid breakthrough  Also anxious, ordered 0 5 ativan prn daily  No N/V, BF  Objective:     Blood pressure 135/77, pulse 76, temperature (!) 97 4 °F (36 3 °C), temperature source Oral, resp  rate 18, height 5' 2" (1 575 m), weight 63 8 kg (140 lb 9 6 oz), SpO2 100 %  ,Body mass index is 25 72 kg/m²  Intake/Output Summary (Last 24 hours) at 06/22/18 1435  Last data filed at 06/22/18 1141   Gross per 24 hour   Intake             1900 ml   Output               80 ml   Net             1820 ml       Invasive Devices     Peripheral Intravenous Line            Peripheral IV 06/22/18 Left Wrist less than 1 day    Peripheral IV 06/22/18 Right Wrist less than 1 day                Physical Exam: NAD  AAOx3  Normal respiratory effort  Soft, TTP over incisions, ND  Incisions c/d/i  No c/c/e    Lab, Imaging and other studies:I have personally reviewed pertinent lab results    , CBC: No results found for: WBC, HGB, HCT, MCV, PLT, ADJUSTEDWBC, MCH, MCHC, RDW, MPV, NRBC, CMP: No results found for: NA, K, CL, CO2, ANIONGAP, BUN, CREATININE, GLUCOSE, CALCIUM, AST, ALT, ALKPHOS, PROT, ALBUMIN, BILITOT, EGFR  VTE Pharmacologic Prophylaxis: Heparin  VTE Mechanical Prophylaxis: sequential compression device

## 2018-06-22 NOTE — OP NOTE
OPERATIVE REPORT  PATIENT NAME: Breana Tom    :  1970  MRN: [id number]  Pt Location: [facility] OR ROOM 14    SURGERY DATE: 2018    Surgeon(s) and Role:     * Mily Dillard PA-C - Ana Cai MD - Primary    Preop Diagnosis:  Diverticulitis of large intestine without perforation or abscess without bleeding [K57 32]    Post-Op Diagnosis Codes:     * Diverticulitis of large intestine without perforation or abscess without bleeding [K57 32]    Procedure(s) (LRB):  ROBOTIC SIGMOID RESECTION (N/A)  SIGMOIDOSCOPY FLEXIBLE (N/A)    Specimen(s):  ID Type Source Tests Collected by Time Destination   1 : sigmoid colon Tissue Large Intestine, Sigmoid Colon TISSUE EXAM García Eddy MD 2018 [id number]        Estimated Blood Loss:   Minimal    Drains:  Urethral Catheter Latex 16 Fr  (Active)   Number of days: 0       Anesthesia Type:   General    Operative Indications:  Diverticulitis of large intestine without perforation or abscess without bleeding [K57 32]    Operative Findings: Thickened diverticular changes in sigmoid colon    Complications:   None    Procedure and Technique:  The patient was placed in a supine position with the arms tucked to her sides and cushioned with 2 layers of soft egg crate material   She was taped using a bandolier type taping to keep her in position  The legs were in [employer]  The abdomen was prepped widely using ChloraPrep  The perineum was prepped using Betadine  The ChloraPrep was allowed to dry  The area was draped in a sterile manner  A time-out was done  We initiated the operation by placing a 5 millimeter trocar in the left lateral abdomen using Visiport technique  We chose that area because the patient had had a tubal ligation and a laparoscopic cholecystectomy  The air insufflation was initiated and evaluation revealed that we had created a small opening in the small bowel mesentery with some pneumatic inflation of that area    This was a matter of the course of the procedure and is not considered a complication  There was no bleeding or other injury apparent  The remainder of the operation was unremarkable in terms of performance  Additional trocars were placed in the right lower quadrant the right supraumbilical area the left upper quadrant and the right upper abdomen  The right upper abdomen trocar was a 5 millimeter trocar  The the original 5 millimeter trocar was changed out for a 9 millimeter robotic trocar  The right lower quadrant robotic trocar was eventually changed out to a 12 millimeter trocar for passage of the stapler  All the other trocars were 9 millimeter robotic trocars  Laparoscopic visualization of the peritoneal surfaces was undertaken  We could not see the liver due to adhesed omentum  The robot was then docked and robotic surgery was initiated  The avascular plane was identified immediately posterior to the superior rectal artery  Dissection in this plane was initiated and the left ureter was identified and avoided during the operation  The plane was dissected down into the superior most retrorectal space with ease  The bowel was very redundant and the mesentery was then  The dissection was carried across to the left peritoneal reflection and up to the superior rectal artery  We then did a lateral approach on the left side to fully mobilize the sigmoid colon  The white line of Toldt was divided up toward the proximal descending colon and the colon was mobilized away from the retroperitoneal attachments  The superior rectal artery was skeletonized, clipped twice on either side of transection, and then divided using cautery scissors  This created great redundancy of a very long and redundant sigmoid colon  The descending colon was also quite mobile and could be brought into the pelvis with ease  Minimal omental attachments were lysed from the sigmoid colon      This mesentery of the rectum was then sequentially divided using cautery scissors at the level of the sacral promontory  The rectum was supple and unremarkable  The retro sigmoid colon was then transected at the proximal rectum using a 60 millimeter echelon stapler with a green load  The staple line appeared intact  A small horizontally oriented trans rectus muscle-splitting incision was then created in the left lower quadrant  A GelPort was positioned in this very small incision  The bowel was delivered and the descending/sigmoid colon junction was noted to be supple and well-vascularized  The mesentery was sequentially divided and ligated using clamp and tie technique  Two 0 Vicryl ties were used  The proximal side of the anastomosis was then chosen, prepared, and then transected using a pursestring placement 1st with a pursestring applicator  Once the bowel was transected it was sent for pathologic evaluation  The anvil of a 29 millimeter [facility] stapler was then positioned  The bowel was cleansed and returned to the abdominal cavity  Air insufflation was reinstituted  Under laparoscopic visualization, the double stapled [facility] stapler was then used to create the colorectal anastomosis  There was no tension on the anastomosis  The bowel appeared normal proximally and distally  Orientation was assured with camera inspection  Flexible sigmoidoscopy was done and revealed well-vascularized bowel proximal and distal to the staple line  The staple line was circumferentially intact and unremarkable  There was no air leak with insufflation when visualized under water from the abdominal side using the camera  The donuts were circumferentially intact  The pelvis was dried and dissection planes were inspected and found to be free of bleeding  Blood loss was a few milliliters  The trocars were removed under camera visualization  The GelPort was then removed  The wounds were irrigated and dried    The left lower quadrant wound was closed at the fascial level using a running 1  PDS suture  The trocars were non cutting trocars and the fascia was not closed at the trocar sites  Wounds were closed using interrupted or running 4 0 Monocryl sutures in the subcuticular layer depending on the length of the wound  Histoacryl was applied to the wounds  Sponge needle instrument counts were correct  One technique revealed no retained sponges      I was present for the entire procedure    Patient Disposition:  PACU     SIGNATURE: Erika Johnson MD  DATE: June 22, 2018  TIME: 10:20 AM

## 2018-06-23 LAB
ALBUMIN SERPL BCP-MCNC: 3.2 G/DL (ref 3.5–5)
ALP SERPL-CCNC: 56 U/L (ref 46–116)
ALT SERPL W P-5'-P-CCNC: 28 U/L (ref 12–78)
ANION GAP SERPL CALCULATED.3IONS-SCNC: 7 MMOL/L (ref 4–13)
AST SERPL W P-5'-P-CCNC: 25 U/L (ref 5–45)
BASOPHILS # BLD AUTO: 0.04 THOUSANDS/ΜL (ref 0–0.1)
BASOPHILS NFR BLD AUTO: 1 % (ref 0–1)
BILIRUB SERPL-MCNC: 0.39 MG/DL (ref 0.2–1)
BUN SERPL-MCNC: 5 MG/DL (ref 5–25)
CALCIUM SERPL-MCNC: 8.4 MG/DL (ref 8.3–10.1)
CHLORIDE SERPL-SCNC: 107 MMOL/L (ref 100–108)
CO2 SERPL-SCNC: 27 MMOL/L (ref 21–32)
CREAT SERPL-MCNC: 0.78 MG/DL (ref 0.6–1.3)
EOSINOPHIL # BLD AUTO: 0.16 THOUSAND/ΜL (ref 0–0.61)
EOSINOPHIL NFR BLD AUTO: 2 % (ref 0–6)
ERYTHROCYTE [DISTWIDTH] IN BLOOD BY AUTOMATED COUNT: 13.4 % (ref 11.6–15.1)
GFR SERPL CREATININE-BSD FRML MDRD: 90 ML/MIN/1.73SQ M
GLUCOSE SERPL-MCNC: 103 MG/DL (ref 65–140)
HCT VFR BLD AUTO: 36.6 % (ref 34.8–46.1)
HGB BLD-MCNC: 11.4 G/DL (ref 11.5–15.4)
IMM GRANULOCYTES # BLD AUTO: 0.02 THOUSAND/UL (ref 0–0.2)
IMM GRANULOCYTES NFR BLD AUTO: 0 % (ref 0–2)
LYMPHOCYTES # BLD AUTO: 1.06 THOUSANDS/ΜL (ref 0.6–4.47)
LYMPHOCYTES NFR BLD AUTO: 14 % (ref 14–44)
MCH RBC QN AUTO: 29.4 PG (ref 26.8–34.3)
MCHC RBC AUTO-ENTMCNC: 31.1 G/DL (ref 31.4–37.4)
MCV RBC AUTO: 94 FL (ref 82–98)
MONOCYTES # BLD AUTO: 0.42 THOUSAND/ΜL (ref 0.17–1.22)
MONOCYTES NFR BLD AUTO: 6 % (ref 4–12)
NEUTROPHILS # BLD AUTO: 5.68 THOUSANDS/ΜL (ref 1.85–7.62)
NEUTS SEG NFR BLD AUTO: 77 % (ref 43–75)
NRBC BLD AUTO-RTO: 0 /100 WBCS
PLATELET # BLD AUTO: 312 THOUSANDS/UL (ref 149–390)
PMV BLD AUTO: 10 FL (ref 8.9–12.7)
POTASSIUM SERPL-SCNC: 3.5 MMOL/L (ref 3.5–5.3)
PROT SERPL-MCNC: 6.8 G/DL (ref 6.4–8.2)
RBC # BLD AUTO: 3.88 MILLION/UL (ref 3.81–5.12)
SODIUM SERPL-SCNC: 141 MMOL/L (ref 136–145)
WBC # BLD AUTO: 7.38 THOUSAND/UL (ref 4.31–10.16)

## 2018-06-23 PROCEDURE — 80053 COMPREHEN METABOLIC PANEL: CPT | Performed by: PHYSICIAN ASSISTANT

## 2018-06-23 PROCEDURE — 85025 COMPLETE CBC W/AUTO DIFF WBC: CPT | Performed by: PHYSICIAN ASSISTANT

## 2018-06-23 RX ORDER — POTASSIUM CHLORIDE 20 MEQ/1
20 TABLET, EXTENDED RELEASE ORAL ONCE
Status: COMPLETED | OUTPATIENT
Start: 2018-06-23 | End: 2018-06-23

## 2018-06-23 RX ORDER — POTASSIUM CHLORIDE 20 MEQ/1
40 TABLET, EXTENDED RELEASE ORAL ONCE
Status: COMPLETED | OUTPATIENT
Start: 2018-06-23 | End: 2018-06-23

## 2018-06-23 RX ORDER — IBUPROFEN 400 MG/1
400 TABLET ORAL EVERY 6 HOURS PRN
Status: DISCONTINUED | OUTPATIENT
Start: 2018-06-23 | End: 2018-06-25 | Stop reason: HOSPADM

## 2018-06-23 RX ADMIN — OXYCODONE HYDROCHLORIDE AND ACETAMINOPHEN 2 TABLET: 5; 325 TABLET ORAL at 08:44

## 2018-06-23 RX ADMIN — ENOXAPARIN SODIUM 40 MG: 100 INJECTION SUBCUTANEOUS at 08:43

## 2018-06-23 RX ADMIN — LEVOTHYROXINE SODIUM 88 MCG: 88 TABLET ORAL at 06:26

## 2018-06-23 RX ADMIN — POTASSIUM CHLORIDE 20 MEQ: 1500 TABLET, EXTENDED RELEASE ORAL at 08:44

## 2018-06-23 RX ADMIN — DEXTROSE AND SODIUM CHLORIDE 84 ML/HR: 5; .45 INJECTION, SOLUTION INTRAVENOUS at 08:50

## 2018-06-23 RX ADMIN — IBUPROFEN 400 MG: 400 TABLET ORAL at 21:14

## 2018-06-23 RX ADMIN — OXYCODONE HYDROCHLORIDE AND ACETAMINOPHEN 1 TABLET: 5; 325 TABLET ORAL at 14:14

## 2018-06-23 RX ADMIN — OXYCODONE HYDROCHLORIDE AND ACETAMINOPHEN 2 TABLET: 5; 325 TABLET ORAL at 02:32

## 2018-06-23 RX ADMIN — DEXTROSE AND SODIUM CHLORIDE 125 ML/HR: 5; .45 INJECTION, SOLUTION INTRAVENOUS at 01:01

## 2018-06-23 RX ADMIN — TRAZODONE HYDROCHLORIDE 50 MG: 50 TABLET ORAL at 21:14

## 2018-06-23 RX ADMIN — POTASSIUM CHLORIDE 40 MEQ: 1500 TABLET, EXTENDED RELEASE ORAL at 08:44

## 2018-06-23 RX ADMIN — BUPROPION HYDROCHLORIDE 450 MG: 150 TABLET, FILM COATED, EXTENDED RELEASE ORAL at 08:43

## 2018-06-23 RX ADMIN — IBUPROFEN 400 MG: 400 TABLET ORAL at 12:23

## 2018-06-23 NOTE — PROGRESS NOTES
Progress Note - Colorectal Surgery   Nano Hiss 49 y o  female MRN: [id number]  Unit/Bed#: [facility] 807-01 Encounter: [id number]    Assessment:  Pt is a 53y  o  F w/ diverticulosis s/p robotic sigmoid resection    Plan:  Advance diet as tolerated  Maintainance IVF until tolerating PO  Home meds  IS/OOB/ambulate  PRN pain control  SQH/SCDs    Subjective/Objective   Chief Complaint:     Subjective: Having pain, 6/10 right now, taking po and IV prns  No bowel function yet  No N/V  Objective:     Blood pressure 142/69, pulse 66, temperature 97 9 °F (36 6 °C), temperature source Oral, resp  rate 18, height 5' 2" (1 575 m), weight 63 8 kg (140 lb 9 6 oz), SpO2 99 %  ,Body mass index is 25 72 kg/m²  Intake/Output Summary (Last 24 hours) at 06/23/18 0439  Last data filed at 06/23/18 0242   Gross per 24 hour   Intake           3717 5 ml   Output             1380 ml   Net           2337 5 ml       Invasive Devices     Peripheral Intravenous Line            Peripheral IV 06/22/18 Left Wrist less than 1 day    Peripheral IV 06/22/18 Right Wrist less than 1 day                Physical Exam: NAD  AAOx3  Normal respiratory effort  Soft, TTP over incisions, ND  Incisions c/d/i  No c/c/e    Lab, Imaging and other studies:I have personally reviewed pertinent lab results    , CBC: No results found for: WBC, HGB, HCT, MCV, PLT, ADJUSTEDWBC, MCH, MCHC, RDW, MPV, NRBC, CMP: No results found for: NA, K, CL, CO2, ANIONGAP, BUN, CREATININE, GLUCOSE, CALCIUM, AST, ALT, ALKPHOS, PROT, ALBUMIN, BILITOT, EGFR  VTE Pharmacologic Prophylaxis: Heparin  VTE Mechanical Prophylaxis: sequential compression device

## 2018-06-23 NOTE — PLAN OF CARE
DISCHARGE PLANNING - CARE MANAGEMENT     Discharge to post-acute care or home with appropriate resources Progressing        GASTROINTESTINAL - ADULT     Minimal or absence of nausea and/or vomiting Progressing     Maintains or returns to baseline bowel function Progressing     Maintains adequate nutritional intake Progressing        METABOLIC, FLUID AND ELECTROLYTES - ADULT     Electrolytes maintained within normal limits Progressing     Fluid balance maintained Progressing        Nutrition/Hydration-ADULT     Nutrient/Hydration intake appropriate for improving, restoring or maintaining nutritional needs Progressing        Potential for Falls     Patient will remain free of falls Progressing        SKIN/TISSUE INTEGRITY - ADULT     Skin integrity remains intact Progressing     Incision(s), wounds(s) or drain site(s) healing without S/S of infection Progressing

## 2018-06-24 LAB
ANION GAP SERPL CALCULATED.3IONS-SCNC: 7 MMOL/L (ref 4–13)
BASOPHILS # BLD AUTO: 0.04 THOUSANDS/ΜL (ref 0–0.1)
BASOPHILS NFR BLD AUTO: 1 % (ref 0–1)
BUN SERPL-MCNC: 4 MG/DL (ref 5–25)
CALCIUM SERPL-MCNC: 9.5 MG/DL (ref 8.3–10.1)
CHLORIDE SERPL-SCNC: 104 MMOL/L (ref 100–108)
CO2 SERPL-SCNC: 27 MMOL/L (ref 21–32)
CREAT SERPL-MCNC: 0.88 MG/DL (ref 0.6–1.3)
EOSINOPHIL # BLD AUTO: 0.26 THOUSAND/ΜL (ref 0–0.61)
EOSINOPHIL NFR BLD AUTO: 3 % (ref 0–6)
ERYTHROCYTE [DISTWIDTH] IN BLOOD BY AUTOMATED COUNT: 13.4 % (ref 11.6–15.1)
GFR SERPL CREATININE-BSD FRML MDRD: 78 ML/MIN/1.73SQ M
GLUCOSE SERPL-MCNC: 103 MG/DL (ref 65–140)
HCT VFR BLD AUTO: 37.1 % (ref 34.8–46.1)
HGB BLD-MCNC: 11.7 G/DL (ref 11.5–15.4)
IMM GRANULOCYTES # BLD AUTO: 0.02 THOUSAND/UL (ref 0–0.2)
IMM GRANULOCYTES NFR BLD AUTO: 0 % (ref 0–2)
LYMPHOCYTES # BLD AUTO: 0.8 THOUSANDS/ΜL (ref 0.6–4.47)
LYMPHOCYTES NFR BLD AUTO: 10 % (ref 14–44)
MCH RBC QN AUTO: 29.3 PG (ref 26.8–34.3)
MCHC RBC AUTO-ENTMCNC: 31.5 G/DL (ref 31.4–37.4)
MCV RBC AUTO: 93 FL (ref 82–98)
MONOCYTES # BLD AUTO: 0.38 THOUSAND/ΜL (ref 0.17–1.22)
MONOCYTES NFR BLD AUTO: 5 % (ref 4–12)
NEUTROPHILS # BLD AUTO: 6.33 THOUSANDS/ΜL (ref 1.85–7.62)
NEUTS SEG NFR BLD AUTO: 81 % (ref 43–75)
NRBC BLD AUTO-RTO: 0 /100 WBCS
PLATELET # BLD AUTO: 357 THOUSANDS/UL (ref 149–390)
PMV BLD AUTO: 9.5 FL (ref 8.9–12.7)
POTASSIUM SERPL-SCNC: 4 MMOL/L (ref 3.5–5.3)
RBC # BLD AUTO: 3.99 MILLION/UL (ref 3.81–5.12)
SODIUM SERPL-SCNC: 138 MMOL/L (ref 136–145)
WBC # BLD AUTO: 7.83 THOUSAND/UL (ref 4.31–10.16)

## 2018-06-24 PROCEDURE — G8989 SELF CARE D/C STATUS: HCPCS

## 2018-06-24 PROCEDURE — G8978 MOBILITY CURRENT STATUS: HCPCS

## 2018-06-24 PROCEDURE — G8979 MOBILITY GOAL STATUS: HCPCS

## 2018-06-24 PROCEDURE — 97166 OT EVAL MOD COMPLEX 45 MIN: CPT

## 2018-06-24 PROCEDURE — 97161 PT EVAL LOW COMPLEX 20 MIN: CPT

## 2018-06-24 PROCEDURE — G8987 SELF CARE CURRENT STATUS: HCPCS

## 2018-06-24 PROCEDURE — 80048 BASIC METABOLIC PNL TOTAL CA: CPT | Performed by: STUDENT IN AN ORGANIZED HEALTH CARE EDUCATION/TRAINING PROGRAM

## 2018-06-24 PROCEDURE — G8988 SELF CARE GOAL STATUS: HCPCS

## 2018-06-24 PROCEDURE — G8980 MOBILITY D/C STATUS: HCPCS

## 2018-06-24 PROCEDURE — 85025 COMPLETE CBC W/AUTO DIFF WBC: CPT | Performed by: STUDENT IN AN ORGANIZED HEALTH CARE EDUCATION/TRAINING PROGRAM

## 2018-06-24 RX ADMIN — BUPROPION HYDROCHLORIDE 450 MG: 150 TABLET, FILM COATED, EXTENDED RELEASE ORAL at 08:30

## 2018-06-24 RX ADMIN — LEVOTHYROXINE SODIUM 88 MCG: 88 TABLET ORAL at 06:00

## 2018-06-24 RX ADMIN — OXYCODONE HYDROCHLORIDE AND ACETAMINOPHEN 1 TABLET: 5; 325 TABLET ORAL at 18:32

## 2018-06-24 RX ADMIN — IBUPROFEN 400 MG: 400 TABLET ORAL at 08:30

## 2018-06-24 RX ADMIN — OXYCODONE HYDROCHLORIDE AND ACETAMINOPHEN 2 TABLET: 5; 325 TABLET ORAL at 05:57

## 2018-06-24 RX ADMIN — ENOXAPARIN SODIUM 40 MG: 100 INJECTION SUBCUTANEOUS at 08:36

## 2018-06-24 RX ADMIN — IBUPROFEN 400 MG: 400 TABLET ORAL at 17:26

## 2018-06-24 RX ADMIN — OXYCODONE HYDROCHLORIDE AND ACETAMINOPHEN 1 TABLET: 5; 325 TABLET ORAL at 23:33

## 2018-06-24 RX ADMIN — OXYCODONE HYDROCHLORIDE AND ACETAMINOPHEN 1 TABLET: 5; 325 TABLET ORAL at 12:29

## 2018-06-24 RX ADMIN — TRAZODONE HYDROCHLORIDE 50 MG: 50 TABLET ORAL at 23:33

## 2018-06-24 NOTE — PHYSICAL THERAPY NOTE
Physical Therapy Initial Evaluation     06/24/18 1404   Note Type   Note type Eval only   Pain Assessment   Pain Assessment 0-10   Pain Score 5   Pain Type Surgical pain   Pain Location Abdomen   Pain Orientation Bilateral   Hospital Pain Intervention(s) Repositioned  (pt recently received pain medication)   Home Living   Type of [address] [place] [address] Two level   Bathroom Shower/Tub Tub/shower unit   Home Equipment (none)   Additional Comments 5+7 steps up to 2nd floor bedroom and bathroom, does have 1/2 bath first floor   Prior Function   Level of [place] Independent with ADLs and functional mobility   Lives With Significant other   Receives Help From Family   ADL Assistance Independent   IADLs Independent   Falls in the last 6 months 0   Restrictions/Precautions   Weight Bearing Precautions Per Order No   Braces or Orthoses (n/a)   Other Precautions Pain   General   Family/Caregiver Present Yes  (mother)   Cognition   Overall Cognitive Status WFL   Arousal/Participation Alert   Orientation Level Oriented X4   Memory Within functional limits   Following Commands Follows all commands and directions without difficulty   RLE Assessment   RLE Assessment WFL   LLE Assessment   LLE Assessment WFL   Coordination   Movements are Fluid and Coordinated 1   Sensation WFL   Light Touch   RLE Light Touch Grossly intact   LLE Light Touch Grossly intact   Bed Mobility   Supine to Sit 6  Modified independent   Additional items HOB elevated   Sit to Supine 6  Modified independent   Additional items HOB elevated   Additional Comments verbally reviewed log roll technique for supine to/from sit (pt does not feel she can lay flat right now)   Transfers   Sit to Stand 7  Independent   Stand to Sit 7  Independent   Ambulation/Elevation   Gait pattern (slow gait speed)   Gait Assistance 7  Independent   Assistive Device (none)   Distance 75 ft x 2    Stair Management Assistance 7  Independent   Additional items Verbal cues Stair Management Technique One rail L;Step to pattern   Number of Stairs 16   Balance   Static Sitting Normal   Dynamic Sitting Normal   Static Standing Normal   Dynamic Standing Normal   Ambulatory Normal   Endurance Deficit   Endurance Deficit No   Activity Tolerance   Activity Tolerance Patient tolerated treatment well   Nurse Made Aware yes   Assessment   Prognosis Good   Problem List Pain   Assessment Pt is a 49year old female s/p robotic sigmoid resection on 6/22/18 due to diverticulitis  Pt is independent at baseline, lives with her boyfriend  Pt currently independent with mobility on level surfaces and on elevations, does c/o abdominal pain but demonstrates no instability or LOB with mobility  Pt was verbally educated on log roll technique for supine to/from sit to reduce abdominal pain to which pt verbalized understanding  No additional skilled PT needs identified at this time  Recommend d/c to home with significant other and family support PRN  Will sign off- please re-consult if pt status changes or if additional needs arise     Barriers to Discharge None   Goals   Patient Goals get better   Treatment Day 0   Plan   PT Frequency (evaluation only)   Recommendation   Recommendation Home with family support   Equipment Recommended (none)   PT - OK to Discharge Yes   Modified Stafford Scale   Modified Liang Scale 0   Barthel Index   Feeding 10   Bathing 5   Grooming Score 5   Dressing Score 10   Bladder Score 10   Bowels Score 10   Toilet Use Score 10   Transfers (Bed/Chair) Score 15   Mobility (Level Surface) Score 15   Stairs Score 10   Barthel Index Score 100     Tari Flor, PT

## 2018-06-24 NOTE — PROGRESS NOTES
Progress Note - Colorectal Surgery   Gil Zamudio 49 y o  female MRN: [id number]  Unit/Bed#: [facility] 807-01 Encounter: [id number]    Assessment:  Pt is a 53y  o  F w/ diverticulosis s/p robotic sigmoid resection    Plan:  Low res diet  IS/OOB/ambulate  Prn pain control  Home medications  SQH/SCDs    Subjective/Objective   Chief Complaint:     Subjective: Crying in pain, says she has only taken motrin overnight, spoke with her post operatively she will need narcotic pain medicine to manage pain  Yogesh po  +F, no BM  Feels hot and nauseous  Objective:     Blood pressure 115/60, pulse 103, temperature 99 3 °F (37 4 °C), temperature source Oral, resp  rate 16, height 5' 2" (1 575 m), weight 63 8 kg (140 lb 9 6 oz), SpO2 98 %  ,Body mass index is 25 72 kg/m²  Intake/Output Summary (Last 24 hours) at 06/24/18 0557  Last data filed at 06/24/18 0300   Gross per 24 hour   Intake          2148 78 ml   Output             2675 ml   Net          -526 22 ml       Invasive Devices     Peripheral Intravenous Line            Peripheral IV 06/22/18 Left Wrist 1 day    Peripheral IV 06/22/18 Right Wrist 1 day                Physical Exam: crying  AAOx3  normal respiratory effort  soft, TTP, ND  Incisions c/d/i  no c/c/e    Lab, Imaging and other studies:I have personally reviewed pertinent lab results    , CBC: No results found for: WBC, HGB, HCT, MCV, PLT, ADJUSTEDWBC, MCH, MCHC, RDW, MPV, NRBC, CMP: No results found for: NA, K, CL, CO2, ANIONGAP, BUN, CREATININE, GLUCOSE, CALCIUM, AST, ALT, ALKPHOS, PROT, ALBUMIN, BILITOT, EGFR  VTE Pharmacologic Prophylaxis: Heparin  VTE Mechanical Prophylaxis: sequential compression device

## 2018-06-25 VITALS
DIASTOLIC BLOOD PRESSURE: 54 MMHG | HEART RATE: 82 BPM | HEIGHT: 62 IN | TEMPERATURE: 98.5 F | BODY MASS INDEX: 25.88 KG/M2 | WEIGHT: 140.6 LBS | SYSTOLIC BLOOD PRESSURE: 97 MMHG | RESPIRATION RATE: 16 BRPM | OXYGEN SATURATION: 99 %

## 2018-06-25 RX ORDER — OXYCODONE HYDROCHLORIDE AND ACETAMINOPHEN 5; 325 MG/1; MG/1
1 TABLET ORAL EVERY 4 HOURS PRN
Qty: 20 TABLET | Refills: 0 | Status: SHIPPED | OUTPATIENT
Start: 2018-06-25 | End: 2018-07-05

## 2018-06-25 RX ADMIN — LEVOTHYROXINE SODIUM 88 MCG: 88 TABLET ORAL at 06:13

## 2018-06-25 RX ADMIN — IBUPROFEN 400 MG: 400 TABLET ORAL at 08:15

## 2018-06-25 RX ADMIN — BUPROPION HYDROCHLORIDE 450 MG: 150 TABLET, FILM COATED, EXTENDED RELEASE ORAL at 08:16

## 2018-06-25 NOTE — CASE MANAGEMENT
Nicholas Hoang, RN Registered Nurse Signed   Case Management Date of Service: 6/25/2018 10:23 AM           Initial Clinical Review     Age/Sex: 49 y o  female     Surgery Date: 6/22/18     Procedure:   SURGERY DATE: 6/22/2018     Surgeon(s) and Role:     * GERMÁN Burrell-PETR - Assisting     * Geoffrey Rios MD - Primary     Preop Diagnosis:  Diverticulitis of large intestine without perforation or abscess without bleeding [K57 32]     Post-Op Diagnosis Codes:     * Diverticulitis of large intestine without perforation or abscess without bleeding [K57 32]     Procedure(s) (LRB):  ROBOTIC SIGMOID RESECTION (N/A)  SIGMOIDOSCOPY FLEXIBLE (N/A)     Anesthesia: general      Admission Orders: Date/Time/Statement: 6/22/18 @ 1050            Orders Placed This Encounter   Procedures    Inpatient Admission       Standing Status:   Standing       Number of Occurrences:   1       Order Specific Question:   Admitting Physician       Answer:   Nate Mcfarland [[id number]]       Order Specific Question:   Level of Care       Answer:   Med Surg [16]       Order Specific Question:   Estimated length of stay       Answer:   More than 2 Midnights       Order Specific Question:   Certification       Answer:   I certify that inpatient services are medically necessary for this patient for a duration of greater than two midnights  See H&P and MD Progress Notes for additional information about the patient's course of treatment          Vital Signs: BP 97/54 (BP Location: Right arm)   Pulse 82   Temp 98 5 °F (36 9 °C) (Oral)   Resp 16   Ht 5' 2" (1 575 m)   Wt 63 8 kg (140 lb 9 6 oz)   SpO2 99%   BMI 25 72 kg/m²      Diet:                 Diet Orders                      Start     Ordered     06/23/18 1017   Diet Regular; Regular House;  Lo Fiber/Lo Residue  Diet effective now     Question Answer Comment   Diet Type Regular     Regular Regular House     Other Restriction(s): Lo Fiber/Lo Residue     RD to adjust diet per protocol? Yes         06/23/18 1016          Mobility: Up as tolerated     DVT Prophylaxis:   Enoxaparin 40 mg daily   SCDs     Pain Control:   PRN Meds:  HYDROmorphone IV x 3    Ibuprofen x 5    LORazepam     Ondansetron x1    oxyCODONE-acetaminophen 1 tab x 4    oxyCODONE-acetaminophen 2 tabs x 5

## 2018-06-25 NOTE — PROGRESS NOTES
Progress Note - Colorectal   Donovan aPul 49 y o  female MRN: [id number]  Unit/Bed#: [facility] 807-01 Encounter: [id number]      Objective: Doing well, tolerating a low residue diet, passing small amounts flatus, had bowel movement last evening  Ambulating halls well  Has incisional pain  1200 UA  1 BM    Blood pressure 94/57, pulse 86, temperature 98 9 °F (37 2 °C), temperature source Oral, resp  rate 16, height 5' 2" (1 575 m), weight 63 8 kg (140 lb 9 6 oz), SpO2 98 %  ,Body mass index is 25 72 kg/m²  Intake/Output Summary (Last 24 hours) at 06/25/18 0548  Last data filed at 06/24/18 2230   Gross per 24 hour   Intake              499 ml   Output             1200 ml   Net             -701 ml       Invasive Devices     Peripheral Intravenous Line            Peripheral IV 06/22/18 Left Wrist 2 days    Peripheral IV 06/22/18 Right Wrist 2 days                Physical Exam:   Abdomen: soft, non distended, pfannenstiel incision clean and dry,  Trochar sites clean and dry  Extremities: no pedal edema, no calf tenderness    Lab, Imaging and other studies:    VTE Pharmacologic Prophylaxis: Heparin  VTE Mechanical Prophylaxis: sequential compression device    Assessment:  POD # 3 Robotic sigmoid colectomy    Plan:  1  Continue OOB and ambulating halls  2  ?  Home today

## 2018-06-25 NOTE — DISCHARGE SUMMARY
Discharge Summary - Colorectal Surgery   Duayne Primrose 49 y o  female MRN: [id number]  [facility]07[facility] Encounter: [id number]        Admitting Diagnosis: Diverticulitis/Diverticulosis    Admit Date: 6/22/18    Discharge Diagnosis: Diverticulosis    Discharge Date: 6/25/18    HPI: This is a pleasant 50 yo woman who has a history of diverticulosis of the sigmoid and descending colon  In 2017 patient had an episode of acute diverticulitis  Patient is now being admitted for surgical excision of her sigmoid colon  Procedures Performed: 6/22/18 Robotic sigmoidectomy      Hospital Course: Javed Viveros has done extremely well post operatively  She is tolerating a low residue diet in small amounts  She is passing flatus and had a bowel movement  She is ambulating the halls well and is pain controlled with Motrin and Percocet  Patient's pfannenstiel incision is excellent, the trochar sites are clean and dry  Patient will be discharged home today and follow with Dr Gio Murray in 4 weeks  A script for Percocet 5/325 every 4-6 hours as needed for pain  #20 dispensed and no refills  Pathology pending    Complications: None      Condition at Discharge: good     Discharge instructions/Information to patient and family:   See after visit summary for information provided to patient and family  Provisions for Follow-Up Care:  See after visit summary for information related to follow-up care and any pertinent home health orders  Disposition: Home    Planned Readmission: No    Discharge Statement   I spent 30 minutes discharging the patient  This time was spent on the day of discharge  I had direct contact with the patient on the day of discharge  Additional documentation is required if more than 30 minutes were spent on discharge  Discharge Medications:  See after visit summary for reconciled discharge medications provided to patient and family

## 2018-06-25 NOTE — CASE MANAGEMENT
Initial Clinical Review    Age/Sex: 49 y o  female    Surgery Date: 6/22/18    Procedure:   SURGERY DATE: 6/22/2018     Surgeon(s) and Role:     * Elaine Pretty PA-C - Paresh Morejon MD - Primary     Preop Diagnosis:  Diverticulitis of large intestine without perforation or abscess without bleeding [K57 32]     Post-Op Diagnosis Codes:     * Diverticulitis of large intestine without perforation or abscess without bleeding [K57 32]     Procedure(s) (LRB):  ROBOTIC SIGMOID RESECTION (N/A)  SIGMOIDOSCOPY FLEXIBLE (N/A)    Anesthesia: general     Admission Orders: Date/Time/Statement: 6/22/18 @ 1050     Orders Placed This Encounter   Procedures    Inpatient Admission     Standing Status:   Standing     Number of Occurrences:   1     Order Specific Question:   Admitting Physician     Answer:   Justyn Mccarthy     Order Specific Question:   Level of Care     Answer:   Med Surg [16]     Order Specific Question:   Estimated length of stay     Answer:   More than 2 Midnights     Order Specific Question:   Certification     Answer:   I certify that inpatient services are medically necessary for this patient for a duration of greater than two midnights  See H&P and MD Progress Notes for additional information about the patient's course of treatment  Vital Signs: BP 97/54 (BP Location: Right arm)   Pulse 82   Temp 98 5 °F (36 9 °C) (Oral)   Resp 16   Ht 5' 2" (1 575 m)   Wt 63 8 kg (140 lb 9 6 oz)   SpO2 99%   BMI 25 72 kg/m²     Diet:        Diet Orders            Start     Ordered    06/23/18 1017  Diet Regular; Regular House; Lo Fiber/Lo Residue  Diet effective now     Question Answer Comment   Diet Type Regular    Regular Regular House    Other Restriction(s): Lo Fiber/Lo Residue    RD to adjust diet per protocol? Yes        06/23/18 1016        Mobility: Up as tolerated    DVT Prophylaxis:   Enoxaparin 40 mg daily   SCDs    Pain Control: PRN Meds:  HYDROmorphone IV x 3   Ibuprofen x 5    LORazepam     Ondansetron x1    oxyCODONE-acetaminophen 1 tab x 4    oxyCODONE-acetaminophen 2 tabs x 5

## 2018-06-25 NOTE — SOCIAL WORK
CM discussed pt during care coordination rounds and pt medically clear and would like medication [facility]  Cm sent script and to will  in pharmacy   Pt family will transport home

## 2018-06-26 NOTE — CASE MANAGEMENT
Notification of Discharge  This is a Notification of Discharge from our facility [address]  Please be advised that this patient has been discharge from our facility  Below you will find the admission and discharge date and time including the patients disposition  PRESENTATION DATE: 6/22/2018  5:46 AM  IP ADMISSION DATE: 6/22/18 1050  DISCHARGE DATE: 6/25/2018 10:23 AM  DISPOSITION: Home/Self Care    [address] [facility] in the [facility] by Justin Harris for 2017  Network Utilization Review Department  Phone: [phone]; Fax [phone]  ATTENTION: The Network Utilization Review Department is now centralized for our 7 Facilities  Make a note that we have a new phone and fax numbers for our Department  Please call with any questions or concerns to [phone] and carefully follow the prompts so that you are directed to the right person  All voicemails are confidential  Fax any determinations, approvals, denials, and requests for initial or continue stay review clinical to [phone]  Due to HIGH CALL volume, it would be easier if you could please send faxed requests to expedite your requests and in part, help us provide discharge notifications faster

## 2018-06-27 ENCOUNTER — TRANSITIONAL CARE MANAGEMENT (OUTPATIENT)
Dept: FAMILY MEDICINE CLINIC | Facility: CLINIC | Age: 48
End: 2018-06-27

## 2018-07-05 ENCOUNTER — TELEPHONE (OUTPATIENT)
Dept: ENDOCRINOLOGY | Facility: HOSPITAL | Age: 48
End: 2018-07-05

## 2018-07-05 DIAGNOSIS — E03.9 HYPOTHYROIDISM, UNSPECIFIED TYPE: Primary | Chronic | ICD-10-CM

## 2018-07-05 DIAGNOSIS — D49.7 PITUITARY TUMOR: ICD-10-CM

## 2018-07-05 NOTE — TELEPHONE ENCOUNTER
Patient request new orders for lab work, T4 Free and TSH and pituitary mri w and wo contrast  Dx pituitary tumor and hypothyroidism

## 2018-07-13 DIAGNOSIS — F41.9 ANXIETY: ICD-10-CM

## 2018-07-13 RX ORDER — BUPROPION HYDROCHLORIDE 150 MG/1
TABLET ORAL
Qty: 90 TABLET | Refills: 0 | Status: SHIPPED | OUTPATIENT
Start: 2018-07-13 | End: 2018-08-15 | Stop reason: SDUPTHER

## 2018-07-17 ENCOUNTER — HOSPITAL ENCOUNTER (OUTPATIENT)
Dept: MRI IMAGING | Facility: HOSPITAL | Age: 48
Discharge: HOME | End: 2018-07-17
Payer: COMMERCIAL

## 2018-07-17 DIAGNOSIS — D49.7 PITUITARY TUMOR: ICD-10-CM

## 2018-07-17 PROCEDURE — 70553 MRI BRAIN STEM W/O & W/DYE: CPT

## 2018-07-17 PROCEDURE — A9585 GADOBUTROL INJECTION: HCPCS | Performed by: INTERNAL MEDICINE

## 2018-07-17 RX ADMIN — GADOBUTROL 6 ML: 604.72 INJECTION INTRAVENOUS at 21:08

## 2018-08-15 DIAGNOSIS — F41.9 ANXIETY: ICD-10-CM

## 2018-08-16 RX ORDER — BUPROPION HYDROCHLORIDE 150 MG/1
TABLET ORAL
Qty: 90 TABLET | Refills: 0 | Status: SHIPPED | OUTPATIENT
Start: 2018-08-16 | End: 2018-09-18 | Stop reason: SDUPTHER

## 2018-09-18 DIAGNOSIS — F41.9 ANXIETY: ICD-10-CM

## 2018-09-18 RX ORDER — BUPROPION HYDROCHLORIDE 150 MG/1
TABLET ORAL
Qty: 90 TABLET | Refills: 0 | Status: SHIPPED | OUTPATIENT
Start: 2018-09-18 | End: 2018-10-17 | Stop reason: SDUPTHER

## 2018-09-20 DIAGNOSIS — L30.9 ECZEMA, UNSPECIFIED TYPE: Primary | ICD-10-CM

## 2018-09-21 RX ORDER — MOMETASONE FUROATE 1 MG/ML
SOLUTION TOPICAL
Qty: 60 ML | Refills: 2 | Status: SHIPPED | OUTPATIENT
Start: 2018-09-21 | End: 2019-10-13 | Stop reason: SDUPTHER

## 2018-10-04 RX ORDER — LORAZEPAM 0.5 MG/1
TABLET ORAL
Refills: 0 | COMMUNITY
Start: 2018-07-05 | End: 2018-10-08 | Stop reason: ALTCHOICE

## 2018-10-08 ENCOUNTER — ANNUAL EXAM (OUTPATIENT)
Dept: FAMILY MEDICINE CLINIC | Facility: CLINIC | Age: 48
End: 2018-10-08
Payer: COMMERCIAL

## 2018-10-08 VITALS
HEART RATE: 74 BPM | RESPIRATION RATE: 12 BRPM | DIASTOLIC BLOOD PRESSURE: 62 MMHG | SYSTOLIC BLOOD PRESSURE: 114 MMHG | WEIGHT: 136 LBS | BODY MASS INDEX: 25.03 KG/M2 | HEIGHT: 62 IN

## 2018-10-08 DIAGNOSIS — Z01.419 ENCOUNTER FOR GYNECOLOGICAL EXAMINATION: Primary | ICD-10-CM

## 2018-10-08 DIAGNOSIS — Z23 NEED FOR PROPHYLACTIC VACCINATION AND INOCULATION AGAINST INFLUENZA: ICD-10-CM

## 2018-10-08 DIAGNOSIS — Z12.39 SCREENING FOR MALIGNANT NEOPLASM OF BREAST: ICD-10-CM

## 2018-10-08 PROCEDURE — 90471 IMMUNIZATION ADMIN: CPT

## 2018-10-08 PROCEDURE — 90686 IIV4 VACC NO PRSV 0.5 ML IM: CPT

## 2018-10-08 PROCEDURE — 99396 PREV VISIT EST AGE 40-64: CPT | Performed by: NURSE PRACTITIONER

## 2018-10-08 NOTE — PROGRESS NOTES
Omaiar Macias is a 51 y o  female here for a routine gynecologic exam       Still getting menses but inconsistent;     Gynecologic History  Patient's last menstrual period was 09/24/2018 (exact date)  Contraception: none  Last Pap: 8/2016   Results were: normal  Last mammogram:one year ago  Results were: normal  Last colonoscopy: 2015  Results were: due at 49    Obstetric History  OB History        The following portions of the patient's history were reviewed and updated as appropriate: allergies, current medications, past family history, past medical history, past social history, past surgical history and problem list     Review of Systems   Constitutional: Negative  Respiratory: Negative  Cardiovascular: Negative  Musculoskeletal: Negative  Skin: Negative  Neurological: Negative  Psychiatric/Behavioral: Negative  Objective    Vitals:    10/08/18 1010   BP: 114/62   BP Location: Left arm   Patient Position: Sitting   Pulse: 74   Resp: 12   Weight: 61 7 kg (136 lb)   Height: 5' 1 5" (1 562 m)       Physical Exam   Constitutional: She is oriented to person, place, and time  She appears well-developed and well-nourished  HENT:   Head: Normocephalic  Eyes: Pupils are equal, round, and reactive to light  Neck: Normal range of motion  Neck supple  Cardiovascular: Normal rate and regular rhythm  Pulmonary/Chest: Effort normal and breath sounds normal    Abdominal: Soft  Bowel sounds are normal    Genitourinary: Vagina normal and uterus normal  There is no rash, tenderness or lesion on the right labia  There is no rash, tenderness or lesion on the left labia  Cervix exhibits no discharge and no friability  Right adnexum displays no mass, no tenderness and no fullness  Left adnexum displays no mass, no tenderness and no fullness  Genitourinary Comments: Pap smear obtained   Musculoskeletal: Normal range of motion     Neurological: She is alert and oriented to person, place, and time  Skin: Skin is warm  Psychiatric: She has a normal mood and affect  Her behavior is normal  Judgment and thought content normal        Assessment     Healthy female exam     we will put a card in the mail with the results of the pap  Plan     Mammogram ordered  Follow up in: 2 years

## 2018-10-11 LAB
CYTOLOGIST CVX/VAG CYTO: NORMAL
DX ICD CODE: NORMAL
OTHER STN SPEC: NORMAL
PATH REPORT.FINAL DX SPEC: NORMAL
SL AMB NOTE:: NORMAL
SL AMB SPECIMEN ADEQUACY: NORMAL

## 2018-10-17 DIAGNOSIS — F41.9 ANXIETY: ICD-10-CM

## 2018-10-17 RX ORDER — BUPROPION HYDROCHLORIDE 150 MG/1
TABLET ORAL
Qty: 90 TABLET | Refills: 0 | Status: SHIPPED | OUTPATIENT
Start: 2018-10-17 | End: 2018-11-14 | Stop reason: SDUPTHER

## 2018-10-23 DIAGNOSIS — E03.9 HYPOTHYROIDISM, UNSPECIFIED TYPE: ICD-10-CM

## 2018-10-23 RX ORDER — LEVOTHYROXINE SODIUM 88 UG/1
88 TABLET ORAL DAILY
Qty: 30 TABLET | Refills: 2 | Status: SHIPPED | OUTPATIENT
Start: 2018-10-23 | End: 2019-02-09 | Stop reason: SDUPTHER

## 2018-10-23 NOTE — TELEPHONE ENCOUNTER
Previous BME patient has appt with you in March  She is currently on Synthroid but would like to try Levothyroxine  She said you always wanted her on BRAND  Are you ok with switching? Which ever one you are comfortable with she needs sent to [facility] In [place]

## 2018-11-07 LAB
T4 FREE SERPL-MCNC: 1.46 NG/DL (ref 0.82–1.77)
TSH SERPL DL<=0.005 MIU/L-ACNC: 0.15 UIU/ML (ref 0.45–4.5)

## 2018-11-14 DIAGNOSIS — F41.9 ANXIETY: ICD-10-CM

## 2018-11-14 RX ORDER — BUPROPION HYDROCHLORIDE 150 MG/1
TABLET ORAL
Qty: 90 TABLET | Refills: 0 | Status: SHIPPED | OUTPATIENT
Start: 2018-11-14 | End: 2018-12-16 | Stop reason: SDUPTHER

## 2018-11-21 ENCOUNTER — HOSPITAL ENCOUNTER (OUTPATIENT)
Dept: MAMMOGRAPHY | Facility: CLINIC | Age: 48
Discharge: HOME | End: 2018-11-21

## 2018-12-16 DIAGNOSIS — F41.9 ANXIETY: ICD-10-CM

## 2018-12-17 RX ORDER — BUPROPION HYDROCHLORIDE 150 MG/1
TABLET ORAL
Qty: 90 TABLET | Refills: 0 | Status: SHIPPED | OUTPATIENT
Start: 2018-12-17 | End: 2019-01-14 | Stop reason: SDUPTHER

## 2019-01-14 DIAGNOSIS — F41.9 ANXIETY: ICD-10-CM

## 2019-01-14 RX ORDER — BUPROPION HYDROCHLORIDE 150 MG/1
TABLET ORAL
Qty: 90 TABLET | Refills: 0 | Status: SHIPPED | OUTPATIENT
Start: 2019-01-14 | End: 2019-02-12 | Stop reason: SDUPTHER

## 2019-01-25 ENCOUNTER — OFFICE VISIT (OUTPATIENT)
Dept: FAMILY MEDICINE CLINIC | Facility: CLINIC | Age: 49
End: 2019-01-25
Payer: COMMERCIAL

## 2019-01-25 ENCOUNTER — HOSPITAL ENCOUNTER (OUTPATIENT)
Dept: MAMMOGRAPHY | Facility: CLINIC | Age: 49
Discharge: HOME | End: 2019-01-25
Payer: COMMERCIAL

## 2019-01-25 VITALS
RESPIRATION RATE: 12 BRPM | DIASTOLIC BLOOD PRESSURE: 60 MMHG | HEART RATE: 60 BPM | HEIGHT: 62 IN | SYSTOLIC BLOOD PRESSURE: 118 MMHG | TEMPERATURE: 98.1 F | BODY MASS INDEX: 25.03 KG/M2 | WEIGHT: 136 LBS

## 2019-01-25 VITALS — BODY MASS INDEX: 25.03 KG/M2 | HEIGHT: 62 IN | WEIGHT: 136 LBS

## 2019-01-25 DIAGNOSIS — Z12.39 SCREENING FOR MALIGNANT NEOPLASM OF BREAST: ICD-10-CM

## 2019-01-25 DIAGNOSIS — R82.90 FOUL SMELLING URINE: Primary | ICD-10-CM

## 2019-01-25 DIAGNOSIS — B34.9 VIRAL ILLNESS: ICD-10-CM

## 2019-01-25 DIAGNOSIS — S61.209A OPEN WOUND OF FINGER, INITIAL ENCOUNTER: ICD-10-CM

## 2019-01-25 LAB
SL AMB  POCT GLUCOSE, UA: ABNORMAL
SL AMB LEUKOCYTE ESTERASE,UA: ABNORMAL
SL AMB POCT BILIRUBIN,UA: ABNORMAL
SL AMB POCT BLOOD,UA: ABNORMAL
SL AMB POCT CLARITY,UA: CLEAR
SL AMB POCT COLOR,UA: YELLOW
SL AMB POCT KETONES,UA: ABNORMAL
SL AMB POCT NITRITE,UA: ABNORMAL
SL AMB POCT PH,UA: 6
SL AMB POCT SPECIFIC GRAVITY,UA: 1.03
SL AMB POCT URINE PROTEIN: 30
SL AMB POCT UROBILINOGEN: 0.2

## 2019-01-25 PROCEDURE — 3008F BODY MASS INDEX DOCD: CPT | Performed by: NURSE PRACTITIONER

## 2019-01-25 PROCEDURE — 81002 URINALYSIS NONAUTO W/O SCOPE: CPT | Performed by: NURSE PRACTITIONER

## 2019-01-25 PROCEDURE — 77063 BREAST TOMOSYNTHESIS BI: CPT

## 2019-01-25 PROCEDURE — 99214 OFFICE O/P EST MOD 30 MIN: CPT | Performed by: NURSE PRACTITIONER

## 2019-01-25 PROCEDURE — 77067 SCR MAMMO BI INCL CAD: CPT

## 2019-01-25 RX ORDER — CEPHALEXIN 500 MG/1
1000 CAPSULE ORAL EVERY 12 HOURS SCHEDULED
Qty: 20 CAPSULE | Refills: 0 | Status: SHIPPED | OUTPATIENT
Start: 2019-01-25 | End: 2019-01-30

## 2019-01-25 NOTE — LETTER
January 25, 2019     Patient: Indira Mak   YOB: 1970   Date of Visit: 1/25/2019       To Whom it May Concern:    Ronda Andrew is under my professional care  She was seen in my office on 1/25/2019  She may return to work on 1/28/2019  She was out of work 1/23/2019,  1/24/2019 and 1/25/2019  If you have any questions or concerns, please don't hesitate to call           Sincerely,          AUSTEN Tavarez        CC: No Recipients

## 2019-01-25 NOTE — PROGRESS NOTES
Assessment/Plan:      1  Foul smelling urine  We have sent your urine for culture and we will call you with the results   - POCT urine dip  - Urine culture    2  Open wound of finger, initial encounter  Please do warm soaks  Keep this covered and take the antibiotic as prescribed  - cephalexin (KEFLEX) 500 mg capsule; Take 2 capsules (1,000 mg total) by mouth every 12 (twelve) hours for 5 days  Dispense: 20 capsule; Refill: 0    3  Viral illness  This has resolved and we have provided a note to return to work    rto prn     Subjective:      Patient ID: Nahomy Camargo is a 51 y o  female  Here today with complain of having the flu early this week  Had fever and general body aches had a headache and slight cough/ is feeling better but needs a work note to return to work  In addition has a wound on her R finger that she noted 2 days ago  Felt that she was scratching at this area in the middle of the night  When she woke up noted the sore area and this seems to have gotten progressively worse since then  Has been using neosporing and  badnade  this am was thrrobbing  Also notes a  Foul smell to her urine   No burning or pain  No fevers or chills  OBJECTIVE  Past Medical History:   Diagnosis Date    Acute hemorrhagic cystitis     last assessed 07/22/2013    Allergic     Anxiety     Cholelithiasis with acute cholecystitis     Colitis     Depression     Disease of thyroid gland     Hypothyroidism    Diverticulitis     Diverticulosis     Frequent UTI     Limb swelling     last assessed 03/20/2013    Menorrhagia     last assessed 01/07/2013    Pituitary tumor     Pneumonia 2004    x1     Psychiatric disorder     Sarcoidosis of lung ([facility] [address] )     Possible- has right "lung pain" and is being evaluated      Stress incontinence     Wears contact lenses     Wears glasses      temporarily  Wt Readings from Last 3 Encounters:   01/25/19 61 7 kg (136 lb)   10/08/18 61 7 kg (136 lb)   07/17/18 62 6 kg (138 lb)     BP Readings from Last 3 Encounters:   01/25/19 118/60   10/08/18 114/62   06/25/18 97/54     Pulse Readings from Last 3 Encounters:   01/25/19 60   10/08/18 74   06/25/18 82     BMI Readings from Last 3 Encounters:   01/25/19 24 87 kg/m²   10/08/18 25 28 kg/m²   07/17/18 25 24 kg/m²        Physical Exam   Constitutional: She appears well-developed and well-nourished  HENT:   Nose: Nose normal    Mouth/Throat: Oropharynx is clear and moist    Neck: Normal range of motion  Neck supple  Cardiovascular: Normal rate and regular rhythm  Pulmonary/Chest: Effort normal and breath sounds normal    Skin:   4 mm open wound on the side of the 3rd finger R hand that has surrounding erythema and selling  No purulent discharge  Painful tot he touch  Appears like a blister that has opened

## 2019-02-08 LAB
BACTERIA UR CULT: NORMAL
Lab: NO GROWTH

## 2019-02-09 DIAGNOSIS — E03.9 HYPOTHYROIDISM, UNSPECIFIED TYPE: ICD-10-CM

## 2019-02-11 RX ORDER — LEVOTHYROXINE SODIUM 88 UG/1
TABLET ORAL
Qty: 30 TABLET | Refills: 2 | Status: SHIPPED | OUTPATIENT
Start: 2019-02-11 | End: 2019-03-06 | Stop reason: SDUPTHER

## 2019-02-12 DIAGNOSIS — F41.9 ANXIETY: ICD-10-CM

## 2019-02-14 RX ORDER — BUPROPION HYDROCHLORIDE 150 MG/1
TABLET ORAL
Qty: 90 TABLET | Refills: 0 | Status: SHIPPED | OUTPATIENT
Start: 2019-02-14 | End: 2019-03-24 | Stop reason: SDUPTHER

## 2019-03-06 ENCOUNTER — OFFICE VISIT (OUTPATIENT)
Dept: ENDOCRINOLOGY | Facility: HOSPITAL | Age: 49
End: 2019-03-06
Payer: COMMERCIAL

## 2019-03-06 VITALS
WEIGHT: 136.6 LBS | SYSTOLIC BLOOD PRESSURE: 124 MMHG | HEART RATE: 78 BPM | HEIGHT: 62 IN | DIASTOLIC BLOOD PRESSURE: 82 MMHG | BODY MASS INDEX: 25.14 KG/M2

## 2019-03-06 DIAGNOSIS — E03.9 HYPOTHYROIDISM, UNSPECIFIED TYPE: ICD-10-CM

## 2019-03-06 DIAGNOSIS — Z86.39 HISTORY OF HYPERPROLACTINEMIA: ICD-10-CM

## 2019-03-06 DIAGNOSIS — E03.9 ACQUIRED HYPOTHYROIDISM: Primary | Chronic | ICD-10-CM

## 2019-03-06 DIAGNOSIS — D49.7 PITUITARY TUMOR: ICD-10-CM

## 2019-03-06 PROCEDURE — 99215 OFFICE O/P EST HI 40 MIN: CPT | Performed by: INTERNAL MEDICINE

## 2019-03-06 RX ORDER — LEVOTHYROXINE SODIUM 88 UG/1
88 TABLET ORAL DAILY
Qty: 30 TABLET | Refills: 11 | Status: SHIPPED | OUTPATIENT
Start: 2019-03-06 | End: 2020-03-24

## 2019-03-06 NOTE — PROGRESS NOTES
3/8/2019    Assessment/Plan      Diagnoses and all orders for this visit:    Acquired hypothyroidism  -     TSH, 3rd generation Lab Collect; Future  -     T4, free Lab Collect; Future  -     Thyroid Antibodies Panel Lab Collect; Future  -     Prolactin Lab Collect; Future  -     Comprehensive metabolic panel Lab Collect; Future  -     Insulin-like growth factor 1 (IGF-1) - Lab Collect; Future  -     TSH, 3rd generation Lab Collect  -     T4, free Lab Collect  -     Thyroid Antibodies Panel Lab Collect  -     Prolactin Lab Collect  -     Comprehensive metabolic panel Lab Collect  -     Insulin-like growth factor 1 (IGF-1) - Lab Collect  -     Comprehensive metabolic panel Lab Collect; Future  -     TSH, 3rd generation Lab Collect; Future  -     T4, free Lab Collect; Future  -     Prolactin Lab Collect; Future  -     Insulin-like growth factor 1 (IGF-1) - Lab Collect; Future  -     Comprehensive metabolic panel Lab Collect  -     TSH, 3rd generation Lab Collect  -     T4, free Lab Collect  -     Prolactin Lab Collect  -     Insulin-like growth factor 1 (IGF-1) - Lab Collect    Pituitary tumor  -     TSH, 3rd generation Lab Collect; Future  -     T4, free Lab Collect; Future  -     Thyroid Antibodies Panel Lab Collect; Future  -     Prolactin Lab Collect; Future  -     Comprehensive metabolic panel Lab Collect; Future  -     Insulin-like growth factor 1 (IGF-1) - Lab Collect; Future  -     TSH, 3rd generation Lab Collect  -     T4, free Lab Collect  -     Thyroid Antibodies Panel Lab Collect  -     Prolactin Lab Collect  -     Comprehensive metabolic panel Lab Collect  -     Insulin-like growth factor 1 (IGF-1) - Lab Collect  -     Comprehensive metabolic panel Lab Collect; Future  -     TSH, 3rd generation Lab Collect; Future  -     T4, free Lab Collect; Future  -     Prolactin Lab Collect; Future  -     Insulin-like growth factor 1 (IGF-1) - Lab Collect;  Future  -     Comprehensive metabolic panel Lab Collect  - TSH, 3rd generation Lab Collect  -     T4, free Lab Collect  -     Prolactin Lab Collect  -     Insulin-like growth factor 1 (IGF-1) - Lab Collect    History of hyperprolactinemia  -     TSH, 3rd generation Lab Collect; Future  -     T4, free Lab Collect; Future  -     Thyroid Antibodies Panel Lab Collect; Future  -     Prolactin Lab Collect; Future  -     Comprehensive metabolic panel Lab Collect; Future  -     Insulin-like growth factor 1 (IGF-1) - Lab Collect; Future  -     TSH, 3rd generation Lab Collect  -     T4, free Lab Collect  -     Thyroid Antibodies Panel Lab Collect  -     Prolactin Lab Collect  -     Comprehensive metabolic panel Lab Collect  -     Insulin-like growth factor 1 (IGF-1) - Lab Collect  -     Comprehensive metabolic panel Lab Collect; Future  -     TSH, 3rd generation Lab Collect; Future  -     T4, free Lab Collect; Future  -     Prolactin Lab Collect; Future  -     Insulin-like growth factor 1 (IGF-1) - Lab Collect; Future  -     Comprehensive metabolic panel Lab Collect  -     TSH, 3rd generation Lab Collect  -     T4, free Lab Collect  -     Prolactin Lab Collect  -     Insulin-like growth factor 1 (IGF-1) - Lab Collect    Hypothyroidism, unspecified type  -     levothyroxine 88 mcg tablet; Take 1 tablet (88 mcg total) by mouth daily        Assessment/Plan:  1  Hypothyroidism  Most recent thyroid function tests done in November of 2018 showed a TSH that was slightly low in her levothyroxine dosage was decreased  I have no more recent blood work than that  For now, she will continue the same levothyroxine 88 mcg 1 tablet 6 days a week and half tablet 1 day a week  I will have her get a TSH and free T4 done now  2  History of pituitary tumor  Last MRI in July 2018 did show no definite discrete pituitary lesion but there was no contrast utilized because of some problems with her IV and she did have some patient motion which may have obscured tiny lesion    I will continue to follow her pituitary hormonal levels over time  3  History of hyperprolactinemia  I will get a prolactin level done now  I will have her get an IGF-1 level, prolactin, TSH, free T4, CMP done now along with thyroid antibody panel  She will call about a week afterwards for the results will make any adjustments as needed  I have also asked her to follow up in 1 year with preceding CMP, prolactin, IGF-1 level, TSH, and free T4       CC:  Hypothyroid follow-up    History of Present Illness     HPI: Mary Ann Hay is a 51y o  year old female with history of hypothyroidism and pituitary tumor with history of hyperprolactinemia in the past   She has been diagnosed with hypothyroidism many years ago  She had also had mild hyperprolactinemia which resolved on its own but a pituitary tumor was noted on previous MRI  She is on levothyroxine 88 mcg 1 tablet 6 days a week and 1/2 tablet 1 day a week  She denies heat or cold intolerance, diarrhea constipation, tremors, or depression  She will get occasional situational anxiety attacks  She will get palpitations with these anxiety attacks  She denies insomnia or fatigue  Weight has been stable  She denies dry skin, brittle nails, or hair loss  Menstrual cycles have started to become more irregular  She did not have a menses from February to September 2018 and then has not had a menses since September 2018  She denies diplopia  She has no compressive thyroid symptoms or difficulties with swallowing  She has no history of head or neck irradiation in the past   She has a history of hyperprolactinemia with pituitary tumor  More recent prolactin is have been normal and she has not needed treatment, just observation  She denies galactorrhea  Menstrual cycles are becoming more irregular but she is at that age were menopause could be occurring  She denies headaches or orthostatics symptoms  She has no peripheral vision loss  She has no nausea, abdominal pain, or fatigue  Weight is been stable  Review of Systems   Constitutional: Negative for fatigue and unexpected weight change  HENT: Negative for hearing loss, tinnitus and trouble swallowing  No XRT to the head or neck  Eyes: Negative for visual disturbance  No diplopia  Wears distance contacts  No peripheral vision loss  Respiratory: Negative for chest tightness and shortness of breath  Cardiovascular: Positive for palpitations  Negative for chest pain and leg swelling  Palpitations with and anxiety attack  Gastrointestinal: Negative for abdominal pain, constipation, diarrhea and nausea  Endocrine: Negative for cold intolerance and heat intolerance  Genitourinary:        No menses since September 2018, had one feb 2018  No galactorrhea  Musculoskeletal: Negative for arthralgias and back pain  Skin: Negative for rash  No dry skin, brittle nails, or hair loss  Neurological: Negative for dizziness, tremors, light-headedness, numbness and headaches  No orthostatic symptoms  Psychiatric/Behavioral: Negative for dysphoric mood and sleep disturbance  The patient is nervous/anxious  Will get occasional anxiety attack situational        Historical Information   Past Medical History:   Diagnosis Date    Acute hemorrhagic cystitis     last assessed 07/22/2013    Allergic     Anxiety     Cholelithiasis with acute cholecystitis     Colitis     Depression     Diverticulitis     Diverticulosis     Eczema     Frequent UTI     Limb swelling     last assessed 03/20/2013    Lung nodule     right    Menorrhagia     last assessed 01/07/2013    Pituitary tumor     Pneumonia 2004    x1     Stress incontinence     Wears contact lenses     Wears glasses      Past Surgical History:   Procedure Laterality Date    BRONCHOSCOPY N/A 9/13/2016    Procedure: BRONCHOSCOPY FLEXIBLE ( FROZEN SECTION) ;   Surgeon: Beatris Hightower MD;  [facility];  Service:    [facility] CHOLECYSTECTOMY      Laparoscopic    COLONOSCOPY      ESOPHAGOGASTRODUODENOSCOPY      ESSURE TUBAL LIGATION      LIPOSUCTION      AR BRONCHOSCOPY NEEDLE BX TRACHEA MAIN STEM&/BRON N/A 9/13/2016    Procedure: ENDOBRONCHIAL ULTRASOUND (EBUS);   Surgeon: Zuleyma Dudley MD;  [facility];  Service: Thoracic    AR CYSTOURETHROSCOPY N/A 11/21/2016    Procedure: CYSTOSCOPY;  Surgeon: Elian Wood MD;  [facility];  Service: UroGynecology            [address] 3 1- 4 CM FACE,FACIAL Left 12/8/2016    Procedure: CHEEK SKIN LESION EXCISION/BIOPSY;  Surgeon: Eugenio Wilson MD;  Location: [facility];  Service: Plastics    AR LAP,SURG,COLECTOMY, PARTIAL, Marshel Fallen N/A 6/22/2018    Procedure: ROBOTIC SIGMOID RESECTION;  Surgeon: Tasia Ozuna MD;  [facility];  Service: Colorectal    AR RECMPL WND HEAD,FAC,HAND 2 6-7 5 CM Left 12/8/2016    Procedure: COMPLEX CLOSURE;  Surgeon: Eugenio Wilson MD;  [facility];  Service: Plastics    AR SIGMOIDOSCOPY FLX DX W/COLLJ SPEC BR/WA IF PFRMD N/A 6/22/2018    Procedure: Babatunde Mata;  Surgeon: Tasia Ozuna MD;  [facility];  Service: Colorectal    AR SLING OPER STRES INCONTINENCE N/A 11/21/2016    Procedure: Pati Rain;  Surgeon: Elian Wood MD;  [facility];  Service: UroGynecology           SINUS SURGERY      WISDOM TOOTH EXTRACTION       Social History   Social History     Substance and Sexual Activity   Alcohol Use Yes    Alcohol/week: 3 6 oz    Types: 2 Glasses of wine, 2 Cans of beer, 2 Shots of liquor per week    Frequency: 2-3 times a week    Comment: weekends only      Social History     Substance and Sexual Activity   Drug Use No     Social History     Tobacco Use   Smoking Status Never Smoker   Smokeless Tobacco Never Used     Family History:   Family History   Problem Relation Age of Onset    Depression Mother     No Known Problems Father     No Known Problems Son    Fany Courser No Known Problems Daughter     Alcohol abuse Family     Depression Family     Osteoporosis Family     Substance Abuse Neg Hx     Mental illness Neg Hx        Meds/Allergies   Current Outpatient Medications   Medication Sig Dispense Refill    buPROPion (WELLBUTRIN XL) 150 mg 24 hr tablet TAKE 3 TABLETS EVERY DAY 90 tablet 0    levothyroxine 88 mcg tablet Take 1 tablet (88 mcg total) by mouth daily 30 tablet 11    mometasone (ELOCON) 0 1 % cream Apply 1 application topically daily   mometasone (ELOCON) 0 1 % lotion APPLY TO AFFECTED AREA ONCE DAILY  60 mL 2    traZODone (DESYREL) 50 mg tablet Take 1 tablet (50 mg total) by mouth daily at bedtime 60 tablet 5     No current facility-administered medications for this visit  Allergies   Allergen Reactions    Bactrim [Sulfamethoxazole-Trimethoprim] Hives    Serotonin Reuptake Inhibitors (Ssris) Hives     Other reaction(s): DUE TO PITUITARY ADENOMA    Effexor had bad effects when being weaned off- has a pituitary  tumor       Objective   Vitals: Blood pressure 124/82, pulse 78, height 5' 2" (1 575 m), weight 62 kg (136 lb 9 6 oz)  Invasive Devices          None          Physical Exam   Constitutional: She is oriented to person, place, and time  She appears well-developed and well-nourished  HENT:   Head: Normocephalic and atraumatic  No moon faces  Eyes: Pupils are equal, round, and reactive to light  Conjunctivae and EOM are normal    No lid lag, stare, proptosis, or periorbital edema  Neck: Normal range of motion  Neck supple  No thyromegaly present  Thyroid normal in size without palpable thyroid nodules  No bruits over the carotids  Cardiovascular: Normal rate and regular rhythm  No murmur heard  Pulmonary/Chest: Effort normal and breath sounds normal  She has no wheezes  Abdominal: Soft  There is no tenderness  Musculoskeletal: Normal range of motion  She exhibits no edema or deformity  No tremor of the outstretched hands  No spinous process tenderness  No CVA tenderness  Lymphadenopathy:     She has no cervical adenopathy  Neurological: She is alert and oriented to person, place, and time  She has normal reflexes  Deep tendon reflexes normal without briskness  Skin: Skin is warm and dry  No rash noted  Vitals reviewed  The history was obtained from the review of the chart, [facility] Endocrinology notes, and from the patient  Lab Results:   MRI BRAIN AND SELLA  WITH AND WITHOUT CONTRAST done on 07/17/2018     INDICATION: 65-year-old female, possible small pituitary tumor     COMPARISON: 11/11/2013 MRI     TECHNIQUE:  Brain: Axial diffusion-weighted imaging  Axial FLAIR and axial T2  Axial gradient  Axial T1 postcontrast Axial bravo postcontrast with coronal reconstructions  Sella: Sagittal and coronal T1  Coronal T2  Sagittal and coronal T1 postcontrast with fat suppression        Dynamic enhanced images could not be performed due to lack of adequate venous access      Targeted images of the sella were performed requiring additional time at acquisition and interpretation of approximately 25%     IV Contrast:  6 mL of Gadobutrol injection (SINGLE-DOSE)      IMAGE QUALITY:  Diagnostic  Mild degree of patient motion degrades the exam  FINDINGS:     BRAIN PARENCHYMA:  There is no discrete mass, mass effect or midline shift  No abnormal white matter signal identified  Brainstem and cerebellum demonstrate normal signal  There is no intracranial hemorrhage  There is no evidence of acute infarction and   diffusion imaging is unremarkable  Postcontrast imaging is normal      VENTRICLES:  Normal      SELLA AND PITUITARY GLAND:  The gland is homogeneous and normal in size  The pituitary stalk is midline  Normal parasellar and suprasellar structures    The tiny defect involving the anterior aspect of the gland suggested on the prior study is not visualized, but could be obscured due to lack of dynamic imaging or mild patient motion on the postcontrast images that were obtained      ORBITS:  Normal      PARANASAL SINUSES:  Sphenoid sinus mucosal thickening again evident    Partial bilateral ethmoidectomy again noted      VASCULATURE:  Evaluation of the major intracranial vasculature demonstrates appropriate flow voids      CALVARIUM AND SKULL BASE:  Normal      EXTRACRANIAL SOFT TISSUES:  Normal      IMPRESSION:  No discrete pituitary lesion identified, although lack of dynamic enhancement and patient motion could obscure tiny lesion     Paranasal sinus disease again noted      Future Appointments   Date Time Provider Divina Dickerson   4/10/2020 10:20 AM Korey Jerez MD [facility]

## 2019-03-06 NOTE — PATIENT INSTRUCTIONS
MRI showed no pituitary tumor at present  Let's get blood work done now  Continue the same levothyroxine 88 mcg 1 tablet 6 days a week and 1/2 tablet 1 day a week for now  Follow up in 1 year with blood work we'll order after this blood work

## 2019-03-06 NOTE — LETTER
March 8, 2019     Anabelle Young, [id number] [place] [address]    Patient: Dameon Mcclelland   YOB: 1970   Date of Visit: 3/6/2019       Dear Dr Catina Isaacs:    Thank you for referring Mary Anne Espinal to me for evaluation  Below are my notes for this consultation  If you have questions, please do not hesitate to call me  I look forward to following your patient along with you  Sincerely,        Shakira Sky MD        CC: No Recipients  Shakira Sky MD  3/8/2019  2:53 PM  Sign at close encounter  3/8/2019    Assessment/Plan      Diagnoses and all orders for this visit:    Acquired hypothyroidism  -     TSH, 3rd generation Lab Collect; Future  -     T4, free Lab Collect; Future  -     Thyroid Antibodies Panel Lab Collect; Future  -     Prolactin Lab Collect; Future  -     Comprehensive metabolic panel Lab Collect; Future  -     Insulin-like growth factor 1 (IGF-1) - Lab Collect; Future  -     TSH, 3rd generation Lab Collect  -     T4, free Lab Collect  -     Thyroid Antibodies Panel Lab Collect  -     Prolactin Lab Collect  -     Comprehensive metabolic panel Lab Collect  -     Insulin-like growth factor 1 (IGF-1) - Lab Collect  -     Comprehensive metabolic panel Lab Collect; Future  -     TSH, 3rd generation Lab Collect; Future  -     T4, free Lab Collect; Future  -     Prolactin Lab Collect; Future  -     Insulin-like growth factor 1 (IGF-1) - Lab Collect; Future  -     Comprehensive metabolic panel Lab Collect  -     TSH, 3rd generation Lab Collect  -     T4, free Lab Collect  -     Prolactin Lab Collect  -     Insulin-like growth factor 1 (IGF-1) - Lab Collect    Pituitary tumor  -     TSH, 3rd generation Lab Collect; Future  -     T4, free Lab Collect; Future  -     Thyroid Antibodies Panel Lab Collect; Future  -     Prolactin Lab Collect; Future  -     Comprehensive metabolic panel Lab Collect;  Future  -     Insulin-like growth factor 1 (IGF-1) - Lab Collect; Future  -     TSH, 3rd generation Lab Collect  -     T4, free Lab Collect  -     Thyroid Antibodies Panel Lab Collect  -     Prolactin Lab Collect  -     Comprehensive metabolic panel Lab Collect  -     Insulin-like growth factor 1 (IGF-1) - Lab Collect  -     Comprehensive metabolic panel Lab Collect; Future  -     TSH, 3rd generation Lab Collect; Future  -     T4, free Lab Collect; Future  -     Prolactin Lab Collect; Future  -     Insulin-like growth factor 1 (IGF-1) - Lab Collect; Future  -     Comprehensive metabolic panel Lab Collect  -     TSH, 3rd generation Lab Collect  -     T4, free Lab Collect  -     Prolactin Lab Collect  -     Insulin-like growth factor 1 (IGF-1) - Lab Collect    History of hyperprolactinemia  -     TSH, 3rd generation Lab Collect; Future  -     T4, free Lab Collect; Future  -     Thyroid Antibodies Panel Lab Collect; Future  -     Prolactin Lab Collect; Future  -     Comprehensive metabolic panel Lab Collect; Future  -     Insulin-like growth factor 1 (IGF-1) - Lab Collect; Future  -     TSH, 3rd generation Lab Collect  -     T4, free Lab Collect  -     Thyroid Antibodies Panel Lab Collect  -     Prolactin Lab Collect  -     Comprehensive metabolic panel Lab Collect  -     Insulin-like growth factor 1 (IGF-1) - Lab Collect  -     Comprehensive metabolic panel Lab Collect; Future  -     TSH, 3rd generation Lab Collect; Future  -     T4, free Lab Collect; Future  -     Prolactin Lab Collect; Future  -     Insulin-like growth factor 1 (IGF-1) - Lab Collect; Future  -     Comprehensive metabolic panel Lab Collect  -     TSH, 3rd generation Lab Collect  -     T4, free Lab Collect  -     Prolactin Lab Collect  -     Insulin-like growth factor 1 (IGF-1) - Lab Collect    Hypothyroidism, unspecified type  -     levothyroxine 88 mcg tablet; Take 1 tablet (88 mcg total) by mouth daily        Assessment/Plan:  1  Hypothyroidism    Most recent thyroid function tests done in November of 2018 showed a TSH that was slightly low in her levothyroxine dosage was decreased  I have no more recent blood work than that  For now, she will continue the same levothyroxine 88 mcg 1 tablet 6 days a week and half tablet 1 day a week  I will have her get a TSH and free T4 done now  2  History of pituitary tumor  Last MRI in July 2018 did show no definite discrete pituitary lesion but there was no contrast utilized because of some problems with her IV and she did have some patient motion which may have obscured tiny lesion  I will continue to follow her pituitary hormonal levels over time  3  History of hyperprolactinemia  I will get a prolactin level done now  I will have her get an IGF-1 level, prolactin, TSH, free T4, CMP done now along with thyroid antibody panel  She will call about a week afterwards for the results will make any adjustments as needed  I have also asked her to follow up in 1 year with preceding CMP, prolactin, IGF-1 level, TSH, and free T4       CC:  Hypothyroid follow-up    History of Present Illness     HPI: Gabo Camargo is a 51y o  year old female with history of hypothyroidism and pituitary tumor with history of hyperprolactinemia in the past   She has been diagnosed with hypothyroidism many years ago  She had also had mild hyperprolactinemia which resolved on its own but a pituitary tumor was noted on previous MRI  She is on levothyroxine 88 mcg 1 tablet 6 days a week and 1/2 tablet 1 day a week  She denies heat or cold intolerance, diarrhea constipation, tremors, or depression  She will get occasional situational anxiety attacks  She will get palpitations with these anxiety attacks  She denies insomnia or fatigue  Weight has been stable  She denies dry skin, brittle nails, or hair loss  Menstrual cycles have started to become more irregular  She did not have a menses from February to September 2018 and then has not had a menses since September 2018   She denies diplopia  She has no compressive thyroid symptoms or difficulties with swallowing  She has no history of head or neck irradiation in the past   She has a history of hyperprolactinemia with pituitary tumor  More recent prolactin is have been normal and she has not needed treatment, just observation  She denies galactorrhea  Menstrual cycles are becoming more irregular but she is at that age were menopause could be occurring  She denies headaches or orthostatics symptoms  She has no peripheral vision loss  She has no nausea, abdominal pain, or fatigue  Weight is been stable  Review of Systems   Constitutional: Negative for fatigue and unexpected weight change  HENT: Negative for hearing loss, tinnitus and trouble swallowing  No XRT to the head or neck  Eyes: Negative for visual disturbance  No diplopia  Wears distance contacts  No peripheral vision loss  Respiratory: Negative for chest tightness and shortness of breath  Cardiovascular: Positive for palpitations  Negative for chest pain and leg swelling  Palpitations with and anxiety attack  Gastrointestinal: Negative for abdominal pain, constipation, diarrhea and nausea  Endocrine: Negative for cold intolerance and heat intolerance  Genitourinary:        No menses since September 2018, had one feb 2018  No galactorrhea  Musculoskeletal: Negative for arthralgias and back pain  Skin: Negative for rash  No dry skin, brittle nails, or hair loss  Neurological: Negative for dizziness, tremors, light-headedness, numbness and headaches  No orthostatic symptoms  Psychiatric/Behavioral: Negative for dysphoric mood and sleep disturbance  The patient is nervous/anxious           Will get occasional anxiety attack situational        Historical Information   Past Medical History:   Diagnosis Date    Acute hemorrhagic cystitis     last assessed 07/22/2013    Allergic     Anxiety     Cholelithiasis with acute cholecystitis     Colitis     Depression     Diverticulitis     Diverticulosis     Eczema     Frequent UTI     Limb swelling     last assessed 03/20/2013    Lung nodule     right    Menorrhagia     last assessed 01/07/2013    Pituitary tumor     Pneumonia 2004    x1     Stress incontinence     Wears contact lenses     Wears glasses      Past Surgical History:   Procedure Laterality Date    BRONCHOSCOPY N/A 9/13/2016    Procedure: BRONCHOSCOPY FLEXIBLE ( FROZEN SECTION) ; Surgeon: Clem Olivia MD;  [facility];  Service:     CHOLECYSTECTOMY      Laparoscopic    COLONOSCOPY      ESOPHAGOGASTRODUODENOSCOPY      ESSURE TUBAL LIGATION      LIPOSUCTION      NC BRONCHOSCOPY NEEDLE BX TRACHEA MAIN STEM&/BRON N/A 9/13/2016    Procedure: ENDOBRONCHIAL ULTRASOUND (EBUS);   Surgeon: Clem Olivia MD;  [facility];  Service: Thoracic    NC CYSTOURETHROSCOPY N/A 11/21/2016    Procedure: CYSTOSCOPY;  Surgeon: Brigitte Daniel MD;  [facility];  Service: UroGynecology            [address] 3 1- 4 CM FACE,FACIAL Left 12/8/2016    Procedure: CHEEK SKIN LESION EXCISION/BIOPSY;  Surgeon: Sophie Wilson MD;  [facility];  Service: Plastics    NC LAP,SURG,COLECTOMY, PARTIAL, [place] N/A 6/22/2018    Procedure: ROBOTIC SIGMOID RESECTION;  Surgeon: Salazar Epstein MD;  [facility];  Service: Colorectal    NC RECMPL WND HEAD,FAC,HAND 2 6-7 5 CM Left 12/8/2016    Procedure: COMPLEX CLOSURE;  Surgeon: Sophie Wilson MD;  [facility];  Service: Plastics    NC SIGMOIDOSCOPY FLX DX W/COLLJ [facility]/WA IF PFRMD N/A 6/22/2018    Procedure: Jerryl Del;  Surgeon: Salazar Epstein MD;  [facility];  Service: Colorectal    NC SLING OPER STRES INCONTINENCE N/A 11/21/2016    Procedure: Jeet Gutting;  Surgeon: Brigitte Daniel MD;  [facility];  Service: UroGynecology           SINUS SURGERY      WISDOM TOOTH EXTRACTION Social History   Social History     Substance and Sexual Activity   Alcohol Use Yes    Alcohol/week: 3 6 oz    Types: 2 Glasses of wine, 2 Cans of beer, 2 Shots of liquor per week    Frequency: 2-3 times a week    Comment: weekends only      Social History     Substance and Sexual Activity   Drug Use No     Social History     Tobacco Use   Smoking Status Never Smoker   Smokeless Tobacco Never Used     Family History:   Family History   Problem Relation Age of Onset    Depression Mother     No Known Problems Father     No Known Problems Son     No Known Problems Daughter     Alcohol abuse Family     Depression Family     Osteoporosis Family     Substance Abuse Neg Hx     Mental illness Neg Hx        Meds/Allergies   Current Outpatient Medications   Medication Sig Dispense Refill    buPROPion (WELLBUTRIN XL) 150 mg 24 hr tablet TAKE 3 TABLETS EVERY DAY 90 tablet 0    levothyroxine 88 mcg tablet Take 1 tablet (88 mcg total) by mouth daily 30 tablet 11    mometasone (ELOCON) 0 1 % cream Apply 1 application topically daily   mometasone (ELOCON) 0 1 % lotion APPLY TO AFFECTED AREA ONCE DAILY  60 mL 2    traZODone (DESYREL) 50 mg tablet Take 1 tablet (50 mg total) by mouth daily at bedtime 60 tablet 5     No current facility-administered medications for this visit  Allergies   Allergen Reactions    Bactrim [Sulfamethoxazole-Trimethoprim] Hives    Serotonin Reuptake Inhibitors (Ssris) Hives     Other reaction(s): DUE TO PITUITARY ADENOMA    Effexor had bad effects when being weaned off- has a pituitary  tumor       Objective   Vitals: Blood pressure 124/82, pulse 78, height 5' 2" (1 575 m), weight 62 kg (136 lb 9 6 oz)  Invasive Devices          None          Physical Exam   Constitutional: She is oriented to person, place, and time  She appears well-developed and well-nourished  HENT:   Head: Normocephalic and atraumatic  No moon faces     Eyes: Pupils are equal, round, and reactive to light  Conjunctivae and EOM are normal    No lid lag, stare, proptosis, or periorbital edema  Neck: Normal range of motion  Neck supple  No thyromegaly present  Thyroid normal in size without palpable thyroid nodules  No bruits over the carotids  Cardiovascular: Normal rate and regular rhythm  No murmur heard  Pulmonary/Chest: Effort normal and breath sounds normal  She has no wheezes  Abdominal: Soft  There is no tenderness  Musculoskeletal: Normal range of motion  She exhibits no edema or deformity  No tremor of the outstretched hands  No spinous process tenderness  No CVA tenderness  Lymphadenopathy:     She has no cervical adenopathy  Neurological: She is alert and oriented to person, place, and time  She has normal reflexes  Deep tendon reflexes normal without briskness  Skin: Skin is warm and dry  No rash noted  Vitals reviewed  The history was obtained from the review of the chart, [facility] notes, and from the patient  Lab Results:   MRI BRAIN AND SELLA  WITH AND WITHOUT CONTRAST done on 07/17/2018     INDICATION: 39-year-old female, possible small pituitary tumor     COMPARISON: 11/11/2013 MRI     TECHNIQUE:  Brain: Axial diffusion-weighted imaging  Axial FLAIR and axial T2  Axial gradient  Axial T1 postcontrast Axial bravo postcontrast with coronal reconstructions  Sella: Sagittal and coronal T1  Coronal T2  Sagittal and coronal T1 postcontrast with fat suppression        Dynamic enhanced images could not be performed due to lack of adequate venous access      Targeted images of the sella were performed requiring additional time at acquisition and interpretation of approximately 25%     IV Contrast:  6 mL of Gadobutrol injection (SINGLE-DOSE)      IMAGE QUALITY:  Diagnostic  Mild degree of patient motion degrades the exam  FINDINGS:     BRAIN PARENCHYMA:  There is no discrete mass, mass effect or midline shift   No abnormal white matter signal identified  Brainstem and cerebellum demonstrate normal signal  There is no intracranial hemorrhage  There is no evidence of acute infarction and   diffusion imaging is unremarkable  Postcontrast imaging is normal      VENTRICLES:  Normal      SELLA AND PITUITARY GLAND:  The gland is homogeneous and normal in size  The pituitary stalk is midline  Normal parasellar and suprasellar structures  The tiny defect involving the anterior aspect of the gland suggested on the prior study is not visualized, but could be obscured due to lack of dynamic imaging or mild patient motion on the postcontrast images that were obtained      ORBITS:  Normal      PARANASAL SINUSES:  Sphenoid sinus mucosal thickening again evident    Partial bilateral ethmoidectomy again noted      VASCULATURE:  Evaluation of the major intracranial vasculature demonstrates appropriate flow voids      CALVARIUM AND SKULL BASE:  Normal      EXTRACRANIAL SOFT TISSUES:  Normal      IMPRESSION:  No discrete pituitary lesion identified, although lack of dynamic enhancement and patient motion could obscure tiny lesion     Paranasal sinus disease again noted      Future Appointments   Date Time Provider Divina Dickerson   4/10/2020 10:20 AM Shakira Sky MD [facility]

## 2019-03-24 DIAGNOSIS — F41.9 ANXIETY: ICD-10-CM

## 2019-03-25 RX ORDER — BUPROPION HYDROCHLORIDE 150 MG/1
TABLET ORAL
Qty: 90 TABLET | Refills: 0 | Status: SHIPPED | OUTPATIENT
Start: 2019-03-25 | End: 2019-05-06 | Stop reason: SDUPTHER

## 2019-05-06 DIAGNOSIS — F41.9 ANXIETY: ICD-10-CM

## 2019-05-06 RX ORDER — BUPROPION HYDROCHLORIDE 150 MG/1
TABLET ORAL
Qty: 90 TABLET | Refills: 0 | Status: SHIPPED | OUTPATIENT
Start: 2019-05-06 | End: 2019-06-06 | Stop reason: SDUPTHER

## 2019-05-08 DIAGNOSIS — G47.00 INSOMNIA, UNSPECIFIED TYPE: ICD-10-CM

## 2019-05-09 RX ORDER — TRAZODONE HYDROCHLORIDE 50 MG/1
50 TABLET ORAL
Qty: 60 TABLET | Refills: 5 | Status: SHIPPED | OUTPATIENT
Start: 2019-05-09 | End: 2020-02-17 | Stop reason: SDUPTHER

## 2019-06-06 DIAGNOSIS — F41.9 ANXIETY: ICD-10-CM

## 2019-06-06 RX ORDER — BUPROPION HYDROCHLORIDE 150 MG/1
TABLET ORAL
Qty: 90 TABLET | Refills: 0 | Status: SHIPPED | OUTPATIENT
Start: 2019-06-06 | End: 2019-07-16 | Stop reason: SDUPTHER

## 2019-06-21 ENCOUNTER — TELEPHONE (OUTPATIENT)
Dept: FAMILY MEDICINE CLINIC | Facility: CLINIC | Age: 49
End: 2019-06-21

## 2019-07-10 ENCOUNTER — TELEPHONE (OUTPATIENT)
Dept: GASTROENTEROLOGY | Facility: CLINIC | Age: 49
End: 2019-07-10

## 2019-07-16 DIAGNOSIS — F41.9 ANXIETY: ICD-10-CM

## 2019-07-16 RX ORDER — BUPROPION HYDROCHLORIDE 150 MG/1
TABLET ORAL
Qty: 90 TABLET | Refills: 5 | Status: SHIPPED | OUTPATIENT
Start: 2019-07-16 | End: 2020-02-04 | Stop reason: SDUPTHER

## 2019-08-01 NOTE — TELEPHONE ENCOUNTER
Dr Diamond Blanc has been contacted to schedule recall colon with no response-please advise   Thank you

## 2019-08-23 LAB
ALBUMIN SERPL-MCNC: 4.8 G/DL (ref 3.5–5.5)
ALBUMIN/GLOB SERPL: 1.8 {RATIO} (ref 1.2–2.2)
ALP SERPL-CCNC: 72 IU/L (ref 39–117)
ALT SERPL-CCNC: 16 IU/L (ref 0–32)
AST SERPL-CCNC: 21 IU/L (ref 0–40)
BILIRUB SERPL-MCNC: 0.4 MG/DL (ref 0–1.2)
BUN SERPL-MCNC: 15 MG/DL (ref 6–24)
BUN/CREAT SERPL: 14 (ref 9–23)
CALCIUM SERPL-MCNC: 9.9 MG/DL (ref 8.7–10.2)
CHLORIDE SERPL-SCNC: 100 MMOL/L (ref 96–106)
CO2 SERPL-SCNC: 26 MMOL/L (ref 20–29)
CREAT SERPL-MCNC: 1.07 MG/DL (ref 0.57–1)
GLOBULIN SER-MCNC: 2.7 G/DL (ref 1.5–4.5)
GLUCOSE SERPL-MCNC: 89 MG/DL (ref 65–99)
IGF-I SERPL-MCNC: 184 NG/ML (ref 57–195)
POTASSIUM SERPL-SCNC: 4.8 MMOL/L (ref 3.5–5.2)
PROLACTIN SERPL-MCNC: 7.4 NG/ML (ref 4.8–23.3)
PROT SERPL-MCNC: 7.5 G/DL (ref 6–8.5)
SL AMB EGFR AFRICAN AMERICAN: 70 ML/MIN/1.73
SL AMB EGFR NON AFRICAN AMERICAN: 61 ML/MIN/1.73
SODIUM SERPL-SCNC: 142 MMOL/L (ref 134–144)
T4 FREE SERPL-MCNC: 1.47 NG/DL (ref 0.82–1.77)
THYROGLOB AB SERPL-ACNC: <1 IU/ML (ref 0–0.9)
THYROPEROXIDASE AB SERPL-ACNC: 10 IU/ML (ref 0–34)
TSH SERPL DL<=0.005 MIU/L-ACNC: 0.51 UIU/ML (ref 0.45–4.5)

## 2019-10-13 DIAGNOSIS — L30.9 ECZEMA, UNSPECIFIED TYPE: ICD-10-CM

## 2019-10-14 RX ORDER — MOMETASONE FUROATE 1 MG/ML
SOLUTION TOPICAL
Qty: 60 ML | Refills: 2 | Status: SHIPPED | OUTPATIENT
Start: 2019-10-14 | End: 2020-01-23

## 2020-01-22 DIAGNOSIS — L30.9 ECZEMA, UNSPECIFIED TYPE: ICD-10-CM

## 2020-01-23 RX ORDER — MOMETASONE FUROATE 1 MG/ML
SOLUTION TOPICAL
Qty: 60 ML | Refills: 0 | Status: SHIPPED | OUTPATIENT
Start: 2020-01-23 | End: 2020-10-30

## 2020-02-04 ENCOUNTER — TELEPHONE (OUTPATIENT)
Dept: FAMILY MEDICINE CLINIC | Facility: CLINIC | Age: 50
End: 2020-02-04

## 2020-02-04 DIAGNOSIS — L30.9 ECZEMA, UNSPECIFIED TYPE: Primary | ICD-10-CM

## 2020-02-04 DIAGNOSIS — F41.9 ANXIETY: ICD-10-CM

## 2020-02-04 RX ORDER — BUPROPION HYDROCHLORIDE 150 MG/1
450 TABLET ORAL DAILY
Qty: 270 TABLET | Refills: 3 | Status: SHIPPED | OUTPATIENT
Start: 2020-02-04 | End: 2020-07-20

## 2020-02-04 RX ORDER — MOMETASONE FUROATE 1 MG/G
1 CREAM TOPICAL DAILY
Qty: 45 G | Refills: 3 | Status: SHIPPED | OUTPATIENT
Start: 2020-02-04 | End: 2022-05-04 | Stop reason: SDUPTHER

## 2020-02-04 NOTE — TELEPHONE ENCOUNTER
Patient is calling asking for 90 day scripts for Bupropion and Mometasone 1% Cream to be sent to [facility]  Any questions call [phone]

## 2020-02-17 ENCOUNTER — HOSPITAL ENCOUNTER (OUTPATIENT)
Dept: MAMMOGRAPHY | Facility: CLINIC | Age: 50
Discharge: HOME | End: 2020-02-17
Payer: COMMERCIAL

## 2020-02-17 ENCOUNTER — TRANSCRIBE ORDERS (OUTPATIENT)
Dept: MAMMOGRAPHY | Facility: CLINIC | Age: 50
End: 2020-02-17

## 2020-02-17 ENCOUNTER — OFFICE VISIT (OUTPATIENT)
Dept: FAMILY MEDICINE CLINIC | Facility: CLINIC | Age: 50
End: 2020-02-17
Payer: COMMERCIAL

## 2020-02-17 VITALS — WEIGHT: 143 LBS | HEIGHT: 62 IN | BODY MASS INDEX: 26.31 KG/M2

## 2020-02-17 VITALS
SYSTOLIC BLOOD PRESSURE: 102 MMHG | WEIGHT: 143 LBS | HEART RATE: 80 BPM | RESPIRATION RATE: 14 BRPM | BODY MASS INDEX: 26.31 KG/M2 | HEIGHT: 62 IN | DIASTOLIC BLOOD PRESSURE: 68 MMHG

## 2020-02-17 DIAGNOSIS — Z12.31 SCREENING MAMMOGRAM, ENCOUNTER FOR: ICD-10-CM

## 2020-02-17 DIAGNOSIS — F32.5 MAJOR DEPRESSIVE DISORDER WITH SINGLE EPISODE, IN FULL REMISSION (HCC): Chronic | ICD-10-CM

## 2020-02-17 DIAGNOSIS — D49.7 PITUITARY TUMOR: ICD-10-CM

## 2020-02-17 DIAGNOSIS — E03.9 ACQUIRED HYPOTHYROIDISM: Chronic | ICD-10-CM

## 2020-02-17 DIAGNOSIS — K57.32 DIVERTICULITIS OF LARGE INTESTINE WITHOUT PERFORATION OR ABSCESS WITHOUT BLEEDING: ICD-10-CM

## 2020-02-17 DIAGNOSIS — Z12.31 SCREENING MAMMOGRAM, ENCOUNTER FOR: Primary | ICD-10-CM

## 2020-02-17 DIAGNOSIS — G47.00 INSOMNIA, UNSPECIFIED TYPE: ICD-10-CM

## 2020-02-17 DIAGNOSIS — Z12.39 SCREENING FOR MALIGNANT NEOPLASM OF BREAST: Primary | ICD-10-CM

## 2020-02-17 PROCEDURE — 1036F TOBACCO NON-USER: CPT | Performed by: NURSE PRACTITIONER

## 2020-02-17 PROCEDURE — 99214 OFFICE O/P EST MOD 30 MIN: CPT | Performed by: NURSE PRACTITIONER

## 2020-02-17 PROCEDURE — 77063 BREAST TOMOSYNTHESIS BI: CPT

## 2020-02-17 PROCEDURE — 3008F BODY MASS INDEX DOCD: CPT | Performed by: INTERNAL MEDICINE

## 2020-02-17 PROCEDURE — 3008F BODY MASS INDEX DOCD: CPT | Performed by: NURSE PRACTITIONER

## 2020-02-17 PROCEDURE — 77067 SCR MAMMO BI INCL CAD: CPT

## 2020-02-17 RX ORDER — TRAZODONE HYDROCHLORIDE 50 MG/1
100 TABLET ORAL
Qty: 180 TABLET | Refills: 3 | Status: SHIPPED | OUTPATIENT
Start: 2020-02-17 | End: 2020-05-11

## 2020-02-17 NOTE — PROGRESS NOTES
@[facility]@  Chief Complaint   Patient presents with    Medication Refill     Assessment/Plan:    1  Screening for malignant neoplasm of breast  Has script and will schedule   - Mammo screening bilateral w cad; Future    2  BMI 26 0-26 9,adult  Please watch your diet and increase exercise as we discussed  Please let us know if there is anythign we can do to help you     3  Acquired hypothyroidism  Is stable on the 88 mcg  Follows with endocrine     4  Pituitary tumor  Follows with endocrine  Stable     5  Major depressive disorder with single episode  Is on the wellbutrin and uses trazodone for sleep   In counseling  Will call if she feels she needs a change in meds  6  Insomnia, unspecified type  Trazodone for sleep  - traZODone (DESYREL) 50 mg tablet; Take 2 tablets (100 mg total) by mouth daily at bedtime  Dispense: 180 tablet; Refill: 3    Due for colonoscopy and will schedule   Is due for mammo and will try to get this done today         Subjective:      Patient ID: Remy Blake is a 47 y o  female  Here today to dg on several chronic issues  Continues to follow with Dr Maria Teresa Bonilla for her pituitary adenoma as well as her hypothyroid  Is stable with this  Follows with gi for her diverticula which results in a perforation several years ago  Is due for her colon and will schedule  Remains on trazodone for sleep and wellbutrin for her depression/anxiety  Has recently resumed counseling due to stressors in her new marriage  Continues to struggle with her weight but knows that a lot of this is stress   Does not want to change her medication at this point         The following portions of the patient's history were reviewed and updated as appropriate: allergies, current medications, past family history, past medical history, past social history, past surgical history and problem list     Past Medical History:   Diagnosis Date    Acute hemorrhagic cystitis     last assessed 07/22/2013    Allergic  Anxiety     Cholelithiasis with acute cholecystitis     Colitis     Depression     Diverticulitis     Diverticulosis     Eczema     Frequent UTI     Limb swelling     last assessed 03/20/2013    Lung nodule     right    Menorrhagia     last assessed 01/07/2013    Pituitary tumor     Pneumonia 2004    x1     Stress incontinence     Wears contact lenses     Wears glasses      Past Surgical History:   Procedure Laterality Date    BRONCHOSCOPY N/A 9/13/2016    Procedure: BRONCHOSCOPY FLEXIBLE ( FROZEN SECTION) ; Surgeon: Demond Tejada MD;  [facility];  Service:     CHOLECYSTECTOMY      Laparoscopic    COLONOSCOPY      ESOPHAGOGASTRODUODENOSCOPY      ESSURE TUBAL LIGATION      LIPOSUCTION      OR BRONCHOSCOPY NEEDLE BX TRACHEA MAIN STEM&/BRON N/A 9/13/2016    Procedure: ENDOBRONCHIAL ULTRASOUND (EBUS);   Surgeon: Demond Tejada MD;  [facility];  Service: Thoracic    OR CYSTOURETHROSCOPY N/A 11/21/2016    Procedure: CYSTOSCOPY;  Surgeon: Sheila Haynes MD;  [facility];  Service: UroGynecology            [address] 3 1- 4 CM FACE,FACIAL Left 12/8/2016    Procedure: CHEEK SKIN LESION EXCISION/BIOPSY;  Surgeon: Tania Nieves MD;  [facility];  Service: Plastics    OR LAP,SURG,COLECTOMY, PARTIAL, Ethel Yecenia N/A 6/22/2018    Procedure: ROBOTIC SIGMOID RESECTION;  Surgeon: Yu Brown MD;  [facility];  Service: Colorectal    OR RECMPL WND HEAD,FAC,HAND 2 6-7 5 CM Left 12/8/2016    Procedure: COMPLEX CLOSURE;  Surgeon: Tania Nieves MD;  [facility];  Service: Plastics    OR SIGMOIDOSCOPY FLX DX W/COLLJ [address] IF PFRMD N/A 6/22/2018    Procedure: John Garcia;  Surgeon: Yu Brown MD;  [facility];  Service: Colorectal    OR SLING OPER STRES INCONTINENCE N/A 11/21/2016    Procedure: Lara Grady;  Surgeon: Sheila Haynes MD;  [facility];  Service: UroGynecology           SINUS SURGERY      WISDOM TOOTH EXTRACTION       Family History   Problem Relation Age of Onset    Depression Mother     No Known Problems Father     No Known Problems Son     No Known Problems Daughter     Alcohol abuse Family     Depression Family     Osteoporosis Family     Substance Abuse Neg Hx     Mental illness Neg Hx      Social History   Social History     Socioeconomic History    Marital status:      Spouse name: Not on file    Number of children: Not on file    Years of education: Not on file    Highest education level: Not on file   Occupational History    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[employer] resource strain: Not on file    Food insecurity:     Worry: Not on file     Inability: Not on file    Transportation needs:     Medical: Not on file     Non-medical: Not on file   Tobacco Use    Smoking status: Never Smoker    Smokeless tobacco: Never Used   Substance and Sexual Activity    Alcohol use:  Yes     Alcohol/week: 6 0 standard drinks     Types: 2 Glasses of wine, 2 Cans of beer, 2 Shots of liquor per week     Frequency: 2-3 times a week     Comment: weekends only     Drug use: No    Sexual activity: Not on file   Lifestyle    Physical activity:     Days per week: Not on file     Minutes per session: Not on file    Stress: Not on file   Relationships    Social connections:     Talks on phone: Not on file     Gets together: Not on file     Attends [employer] service: Not on file     Active member of club or organization: Not on file     Attends meetings of clubs or organizations: Not on file     Relationship status: Not on file    Intimate partner violence:     Fear of current or ex partner: Not on file     Emotionally abused: Not on file     Physically abused: Not on file     Forced sexual activity: Not on file   Other Topics Concern    Not on file   Social History Narrative    No caffeine use    Has smoke detectors    Uses safety equipment-seat belts     Review of Systems   Constitutional: Negative  Respiratory: Negative  Cardiovascular: Negative  Musculoskeletal: Negative  Skin: Negative  Neurological: Negative  Psychiatric/Behavioral: Negative  Vitals:    02/17/20 1058   BP: 102/68   Pulse: 80   Resp: 14   Weight: 64 9 kg (143 lb)   Height: 5' 2" (1 575 m)       Objective:   Wt Readings from Last 3 Encounters:   02/17/20 64 9 kg (143 lb)   03/06/19 62 kg (136 lb 9 6 oz)   01/25/19 61 7 kg (136 lb)     BP Readings from Last 3 Encounters:   02/17/20 102/68   03/06/19 124/82   01/25/19 118/60     Pulse Readings from Last 3 Encounters:   02/17/20 80   03/06/19 78   01/25/19 60     BMI Readings from Last 3 Encounters:   02/17/20 26 16 kg/m²   03/06/19 24 98 kg/m²   01/25/19 24 87 kg/m²     Office Visit on 03/06/2019   Component Date Value Ref Range Status    TSH 08/21/2019 0 505  0 450 - 4 500 uIU/mL Final    Free t4 08/21/2019 1 47  0 82 - 1 77 ng/dL Final    THYROID MICROSOMAL ANTIBODY 08/21/2019 10  0 - 34 IU/mL Final    Thyroglobulin Ab 08/21/2019 <1 0  0 0 - 0 9 IU/mL Final    Thyroglobulin Antibody measured by [facility] [place] [facility]    Prolactin 08/21/2019 7 4  4 8 - 23 3 ng/mL Final    Glucose, Random 08/21/2019 89  65 - 99 mg/dL Final    BUN 08/21/2019 15  6 - 24 mg/dL Final    Creatinine 08/21/2019 1 07* 0 57 - 1 00 mg/dL Final    eGFR Non  08/21/2019 61  >59 mL/min/1 73 Final    eGFR  08/21/2019 70  >59 mL/min/1 73 Final    SL AMB BUN/CREATININE RATIO 08/21/2019 14  9 - 23 Final    Sodium 08/21/2019 142  134 - 144 mmol/L Final    Potassium 08/21/2019 4 8  3 5 - 5 2 mmol/L Final    Chloride 08/21/2019 100  96 - 106 mmol/L Final    CO2 08/21/2019 26  20 - 29 mmol/L Final    CALCIUM 08/21/2019 9 9  8 7 - 10 2 mg/dL Final    Protein, Total 08/21/2019 7 5  6 0 - 8 5 g/dL Final    Albumin 08/21/2019 4 8  3 5 - 5 5 g/dL Final    Globulin, Total 08/21/2019 2 7 1 5 - 4 5 g/dL Final    Albumin/Globulin Ratio 08/21/2019 1 8  1 2 - 2 2 Final    TOTAL BILIRUBIN 08/21/2019 0 4  0 0 - 1 2 mg/dL Final    Alk Phos Isoenzymes 08/21/2019 72  39 - 117 IU/L Final    AST 08/21/2019 21  0 - 40 IU/L Final    ALT 08/21/2019 16  0 - 32 IU/L Final    Insulin-Like GF-1 08/21/2019 184  57 - 195 ng/mL Final   Office Visit on 01/25/2019   Component Date Value Ref Range Status    LEUKOCYTE ESTERASE,UA 01/25/2019 mod   Final    NITRITE,UA 01/25/2019 neg   Final    SL AMB POCT UROBILINOGEN 01/25/2019 0 2   Final    POCT URINE PROTEIN 01/25/2019 30   Final     PH,UA 01/25/2019 6 0   Final    BLOOD,UA 01/25/2019 large   Final    SPECIFIC GRAVITY,UA 01/25/2019 1 030   Final    KETONES,UA 01/25/2019 neg   Final    BILIRUBIN,UA 01/25/2019 neg   Final    GLUCOSE, UA 01/25/2019 neg   Final     COLOR,UA 01/25/2019 yellow   Final    CLARITY,UA 01/25/2019 clear   Final    Urine Culture Result 01/25/2019 Final report   Final    Result 1 01/25/2019 No growth   Final   Annual Exam on 10/08/2018   Component Date Value Ref Range Status    Diagnosis: 10/08/2018 Comment   Final    Comment: NEGATIVE FOR INTRAEPITHELIAL LESION AND MALIGNANCY  THE CYTOLOGY PROCESSING WAS PERFORMED AT THE [facility] LOCATED AT  [place] [address], [address], [address] [place], [place] [id number]   Specimen Adequacy 10/08/2018 Comment   Final    Comment: Satisfactory for evaluation  Endocervical and/or squamous metaplastic  cells (endocervical component) are present   Clinician Provided ICD10 10/08/2018 Comment   Final    [id number]    Performed by: 10/08/2018 Comment   Final    Camilo Jose, Cytotechnologist (ASCP)    SL AMB   10/08/2018   Final    Note: 10/08/2018 Comment   Final    Comment: The Pap smear is a screening test designed to aid in the detection of  premalignant and malignant conditions of the uterine cervix    It is not a  diagnostic procedure and should not be used as the sole means of detecting  cervical cancer  Both false-positive and false-negative reports do occur  Orders Only on 07/05/2018   Component Date Value Ref Range Status    TSH 11/06/2018 0 149* 0 450 - 4 500 uIU/mL Final    Free t4 11/06/2018 1 46  0 82 - 1 77 ng/dL Final   Admission on 06/22/2018, Discharged on 06/25/2018   Component Date Value Ref Range Status    ABO Grouping 06/22/2018 O   Final    Rh Factor 06/22/2018 Positive   Final    Antibody Screen 06/22/2018 Negative   Final    Specimen Expiration Date 06/22/2018 [id number]   Final    Case Report 06/22/2018    Final                    Value:Surgical Pathology Report                         Case: [id number]                                   Authorizing Provider:  Sakina Gomez MD       Collected:           06/22/2018 0936              Ordering Location:     [address] [place] [address]      Received:            06/22/2018 [address] [place] [address] Operating Room                                                      Pathologist:           Audie De Jesus MD                                                              Specimen:    Large Intestine, Sigmoid Colon, sigmoid colon                                              Final Diagnosis 06/22/2018    Final                    Value: This result contains rich text formatting which cannot be displayed here   Note 06/22/2018    Final                    Value: This result contains rich text formatting which cannot be displayed here   Additional Information 06/22/2018    Final                    Value: This result contains rich text formatting which cannot be displayed here  Cruz Vega Description 06/22/2018    Final                    Value: This result contains rich text formatting which cannot be displayed here      POC Glucose 06/22/2018 120  65 - 140 mg/dl Final    WBC 06/23/2018 7 38  4 31 - 10 16 Thousand/uL Final    RBC 06/23/2018 3 88  3 81 - 5 12 Million/uL Final    Hemoglobin 06/23/2018 11 4* 11 5 - 15 4 g/dL Final    Hematocrit 06/23/2018 36 6  34 8 - 46 1 % Final    MCV 06/23/2018 94  82 - 98 fL Final    MCH 06/23/2018 29 4  26 8 - 34 3 pg Final    MCHC 06/23/2018 31 1* 31 4 - 37 4 g/dL Final    RDW 06/23/2018 13 4  11 6 - 15 1 % Final    MPV 06/23/2018 10 0  8 9 - 12 7 fL Final    Platelets 54/21/3208 312  149 - 390 Thousands/uL Final    nRBC 06/23/2018 0  /100 WBCs Final    Neutrophils Relative 06/23/2018 77* 43 - 75 % Final    Immat GRANS % 06/23/2018 0  0 - 2 % Final    Lymphocytes Relative 06/23/2018 14  14 - 44 % Final    Monocytes Relative 06/23/2018 6  4 - 12 % Final    Eosinophils Relative 06/23/2018 2  0 - 6 % Final    Basophils Relative 06/23/2018 1  0 - 1 % Final    Neutrophils Absolute 06/23/2018 5 68  1 85 - 7 62 Thousands/µL Final    Immature Grans Absolute 06/23/2018 0 02  0 00 - 0 20 Thousand/uL Final    Lymphocytes Absolute 06/23/2018 1 06  0 60 - 4 47 Thousands/µL Final    Monocytes Absolute 06/23/2018 0 42  0 17 - 1 22 Thousand/µL Final    Eosinophils Absolute 06/23/2018 0 16  0 00 - 0 61 Thousand/µL Final    Basophils Absolute 06/23/2018 0 04  0 00 - 0 10 Thousands/µL Final    Sodium 06/23/2018 141  136 - 145 mmol/L Final    Potassium 06/23/2018 3 5  3 5 - 5 3 mmol/L Final    Chloride 06/23/2018 107  100 - 108 mmol/L Final    CO2 06/23/2018 27  21 - 32 mmol/L Final    ANION GAP 06/23/2018 7  4 - 13 mmol/L Final    BUN 06/23/2018 5  5 - 25 mg/dL Final    Creatinine 06/23/2018 0 78  0 60 - 1 30 mg/dL Final    Standardized to IDMS reference method    Glucose 06/23/2018 103  65 - 140 mg/dL Final      If the patient is fasting, the ADA then defines impaired fasting glucose as > 100 mg/dL and diabetes as > or equal to 123 mg/dL  Specimen collection should occur prior to Sulfasalazine administration due to the potential for falsely depressed results   Specimen collection should occur prior to Sulfapyridine administration due to the potential for falsely elevated results   Calcium 06/23/2018 8 4  8 3 - 10 1 mg/dL Final    AST 06/23/2018 25  5 - 45 U/L Final      Specimen collection should occur prior to Sulfasalazine administration due to the potential for falsely depressed results   ALT 06/23/2018 28  12 - 78 U/L Final      Specimen collection should occur prior to Sulfasalazine and/or Sulfapyridine administration due to the potential for falsely depressed results       Alkaline Phosphatase 06/23/2018 56  46 - 116 U/L Final    Total Protein 06/23/2018 6 8  6 4 - 8 2 g/dL Final    Albumin 06/23/2018 3 2* 3 5 - 5 0 g/dL Final    Total Bilirubin 06/23/2018 0 39  0 20 - 1 00 mg/dL Final    eGFR 06/23/2018 90  ml/min/1 73sq m Final    WBC 06/24/2018 7 83  4 31 - 10 16 Thousand/uL Final    RBC 06/24/2018 3 99  3 81 - 5 12 Million/uL Final    Hemoglobin 06/24/2018 11 7  11 5 - 15 4 g/dL Final    Hematocrit 06/24/2018 37 1  34 8 - 46 1 % Final    MCV 06/24/2018 93  82 - 98 fL Final    MCH 06/24/2018 29 3  26 8 - 34 3 pg Final    MCHC 06/24/2018 31 5  31 4 - 37 4 g/dL Final    RDW 06/24/2018 13 4  11 6 - 15 1 % Final    MPV 06/24/2018 9 5  8 9 - 12 7 fL Final    Platelets 14/33/5424 357  149 - 390 Thousands/uL Final    nRBC 06/24/2018 0  /100 WBCs Final    Neutrophils Relative 06/24/2018 81* 43 - 75 % Final    Immat GRANS % 06/24/2018 0  0 - 2 % Final    Lymphocytes Relative 06/24/2018 10* 14 - 44 % Final    Monocytes Relative 06/24/2018 5  4 - 12 % Final    Eosinophils Relative 06/24/2018 3  0 - 6 % Final    Basophils Relative 06/24/2018 1  0 - 1 % Final    Neutrophils Absolute 06/24/2018 6 33  1 85 - 7 62 Thousands/µL Final    Immature Grans Absolute 06/24/2018 0 02  0 00 - 0 20 Thousand/uL Final    Lymphocytes Absolute 06/24/2018 0 80  0 60 - 4 47 Thousands/µL Final    Monocytes Absolute 06/24/2018 0 38  0 17 - 1 22 Thousand/µL Final    Eosinophils Absolute 06/24/2018 0 26  0 00 - 0 61 Thousand/µL Final    Basophils Absolute 06/24/2018 0  04  0 00 - 0 10 Thousands/µL Final    Sodium 06/24/2018 138  136 - 145 mmol/L Final    Potassium 06/24/2018 4 0  3 5 - 5 3 mmol/L Final    Chloride 06/24/2018 104  100 - 108 mmol/L Final    CO2 06/24/2018 27  21 - 32 mmol/L Final    ANION GAP 06/24/2018 7  4 - 13 mmol/L Final    BUN 06/24/2018 4* 5 - 25 mg/dL Final    Creatinine 06/24/2018 0 88  0 60 - 1 30 mg/dL Final    Standardized to IDMS reference method    Glucose 06/24/2018 103  65 - 140 mg/dL Final      If the patient is fasting, the ADA then defines impaired fasting glucose as > 100 mg/dL and diabetes as > or equal to 123 mg/dL  Specimen collection should occur prior to Sulfasalazine administration due to the potential for falsely depressed results  Specimen collection should occur prior to Sulfapyridine administration due to the potential for falsely elevated results   Calcium 06/24/2018 9 5  8 3 - 10 1 mg/dL Final    eGFR 06/24/2018 78  ml/min/1 73sq m Final   Orders Only on 03/27/2018   Component Date Value Ref Range Status    Urine Culture Result 03/27/2018 Final report   Final    Result 1 03/27/2018 No growth   Final   Office Visit on 03/27/2018   Component Date Value Ref Range Status    LEUKOCYTE ESTERASE,UA 03/27/2018 large   Final    NITRITE,UA 03/27/2018 neg   Final    SL AMB POCT UROBILINOGEN 03/27/2018 0 2   Final    POCT URINE PROTEIN 03/27/2018 neg   Final     PH,UA 03/27/2018 6 5   Final    BLOOD,UA 03/27/2018 large   Final    SPECIFIC GRAVITY,UA 03/27/2018 1   10   Final    Jeneal Stinson 03/27/2018 neg   Final    BILIRUBIN,UA 03/27/2018 neg   Final    GLUCOSE, UA 03/27/2018 neg   Final     COLOR,UA 03/27/2018 yellow   Final    CLARITY,UA 03/27/2018 hazy   Final          Physical Exam   Constitutional: She is oriented to person, place, and time  She appears well-developed and well-nourished  Neck: Normal range of motion  Neck supple  No thyromegaly present     Cardiovascular: Normal rate, regular rhythm, S1 normal and normal heart sounds  Exam reveals no distant heart sounds  Pulmonary/Chest: Effort normal and breath sounds normal  No respiratory distress  She has no wheezes  Musculoskeletal: Normal range of motion  Neurological: She is alert and oriented to person, place, and time  Skin: Skin is warm and dry  Psychiatric: She has a normal mood and affect  Her behavior is normal  Judgment and thought content normal        BMI Counseling: Body mass index is 26 16 kg/m²  The BMI is above normal  Nutrition recommendations include reducing portion sizes, decreasing overall calorie intake and 3-5 servings of fruits/vegetables daily  Exercise recommendations include moderate aerobic physical activity for 150 minutes/week

## 2020-02-19 ENCOUNTER — HOSPITAL ENCOUNTER (OUTPATIENT)
Dept: GASTROENTEROLOGY | Facility: HOSPITAL | Age: 50
Setting detail: OUTPATIENT SURGERY
Discharge: HOME | End: 2020-02-19
Attending: COLON & RECTAL SURGERY | Admitting: COLON & RECTAL SURGERY
Payer: COMMERCIAL

## 2020-02-19 ENCOUNTER — ANESTHESIA (OUTPATIENT)
Dept: GASTROENTEROLOGY | Facility: HOSPITAL | Age: 50
End: 2020-02-19

## 2020-02-19 ENCOUNTER — ANESTHESIA EVENT (OUTPATIENT)
Dept: GASTROENTEROLOGY | Facility: HOSPITAL | Age: 50
End: 2020-02-19

## 2020-02-19 VITALS
WEIGHT: 143 LBS | SYSTOLIC BLOOD PRESSURE: 96 MMHG | TEMPERATURE: 97.7 F | BODY MASS INDEX: 26.31 KG/M2 | HEIGHT: 62 IN | OXYGEN SATURATION: 100 % | RESPIRATION RATE: 20 BRPM | HEART RATE: 59 BPM | DIASTOLIC BLOOD PRESSURE: 57 MMHG

## 2020-02-19 DIAGNOSIS — Z12.11 SPECIAL SCREENING FOR MALIGNANT NEOPLASMS, COLON: ICD-10-CM

## 2020-02-19 PROCEDURE — 45385 COLONOSCOPY W/LESION REMOVAL: CPT | Performed by: COLON & RECTAL SURGERY

## 2020-02-19 PROCEDURE — 88305 TISSUE EXAM BY PATHOLOGIST: CPT | Performed by: PATHOLOGY

## 2020-02-19 PROCEDURE — 45380 COLONOSCOPY AND BIOPSY: CPT | Performed by: COLON & RECTAL SURGERY

## 2020-02-19 PROCEDURE — 99213 OFFICE O/P EST LOW 20 MIN: CPT | Performed by: COLON & RECTAL SURGERY

## 2020-02-19 RX ORDER — SODIUM CHLORIDE 9 MG/ML
INJECTION, SOLUTION INTRAVENOUS CONTINUOUS PRN
Status: DISCONTINUED | OUTPATIENT
Start: 2020-02-19 | End: 2020-02-19 | Stop reason: SURG

## 2020-02-19 RX ORDER — PROPOFOL 10 MG/ML
INJECTION, EMULSION INTRAVENOUS AS NEEDED
Status: DISCONTINUED | OUTPATIENT
Start: 2020-02-19 | End: 2020-02-19 | Stop reason: SURG

## 2020-02-19 RX ORDER — PROPOFOL 10 MG/ML
INJECTION, EMULSION INTRAVENOUS CONTINUOUS PRN
Status: DISCONTINUED | OUTPATIENT
Start: 2020-02-19 | End: 2020-02-19 | Stop reason: SURG

## 2020-02-19 RX ORDER — SODIUM CHLORIDE, SODIUM LACTATE, POTASSIUM CHLORIDE, CALCIUM CHLORIDE 600; 310; 30; 20 MG/100ML; MG/100ML; MG/100ML; MG/100ML
125 INJECTION, SOLUTION INTRAVENOUS CONTINUOUS
Status: CANCELLED | OUTPATIENT
Start: 2020-02-19

## 2020-02-19 RX ADMIN — PROPOFOL 120 MCG/KG/MIN: 10 INJECTION, EMULSION INTRAVENOUS at 10:23

## 2020-02-19 RX ADMIN — PROPOFOL 100 MG: 10 INJECTION, EMULSION INTRAVENOUS at 10:23

## 2020-02-19 RX ADMIN — SODIUM CHLORIDE: 0.9 INJECTION, SOLUTION INTRAVENOUS at 10:17

## 2020-02-19 NOTE — ANESTHESIA PREPROCEDURE EVALUATION
Review of Systems/Medical History  Patient summary reviewed  Chart reviewed      Cardiovascular  Negative cardio ROS Exercise tolerance (METS): >4,     Pulmonary  Pneumonia,        GI/Hepatic    No GERD , Bowel prep       Negative  ROS        Endo/Other  History of thyroid disease , hypothyroidism,      GYN  Negative gynecology ROS          Hematology  Negative hematology ROS      Musculoskeletal  Negative musculoskeletal ROS        Neurology    CNS neoplasm ,   Comment: Pituitary tumor   Psychology   Anxiety, Depression ,              Physical Exam    Airway    Mallampati score: I  TM Distance: >3 FB  Neck ROM: full     Dental   No notable dental hx     Cardiovascular  Comment: Negative ROS, Cardiovascular exam normal    Pulmonary  Pulmonary exam normal     Other Findings        Anesthesia Plan  ASA Score- 2     Anesthesia Type- IV sedation with anesthesia with ASA Monitors  Additional Monitors:   Airway Plan:         Plan Factors-  Patient did not smoke on day of surgery  Induction- intravenous  Postoperative Plan-     Informed Consent- Anesthetic plan and risks discussed with patient  I personally reviewed this patient with the CRNA  Discussed and agreed on the Anesthesia Plan with the CRNA  Kimani Bingham

## 2020-02-19 NOTE — ANESTHESIA POSTPROCEDURE EVALUATION
Post-Op Assessment Note    CV Status:  Stable  Pain Score: 0    Pain management: adequate     Mental Status:  Awake   Hydration Status:  Euvolemic   PONV Controlled:  None   Airway Patency:  Patent   Post Op Vitals Reviewed: Yes      Staff: Anesthesiologist, CRNA   Comments: report given to RN          BP 96/58 (02/19/20 1045)    Temp      Pulse 67 (02/19/20 1045)   Resp 18 (02/19/20 1045)    SpO2 98 % (02/19/20 1045)

## 2020-02-25 ENCOUNTER — APPOINTMENT (OUTPATIENT)
Dept: MAMMOGRAPHY | Facility: CLINIC | Age: 50
End: 2020-02-25
Payer: COMMERCIAL

## 2020-03-24 DIAGNOSIS — E03.9 HYPOTHYROIDISM, UNSPECIFIED TYPE: ICD-10-CM

## 2020-03-24 RX ORDER — LEVOTHYROXINE SODIUM 88 UG/1
TABLET ORAL
Qty: 30 TABLET | Refills: 11 | Status: SHIPPED | OUTPATIENT
Start: 2020-03-24 | End: 2020-04-10 | Stop reason: SDUPTHER

## 2020-04-07 LAB
ALBUMIN SERPL-MCNC: 4.7 G/DL (ref 3.8–4.8)
ALBUMIN/GLOB SERPL: 2.1 {RATIO} (ref 1.2–2.2)
ALP SERPL-CCNC: 62 IU/L (ref 39–117)
ALT SERPL-CCNC: 17 IU/L (ref 0–32)
AST SERPL-CCNC: 21 IU/L (ref 0–40)
BILIRUB SERPL-MCNC: 0.3 MG/DL (ref 0–1.2)
BUN SERPL-MCNC: 19 MG/DL (ref 6–24)
BUN/CREAT SERPL: 20 (ref 9–23)
CALCIUM SERPL-MCNC: 9.3 MG/DL (ref 8.7–10.2)
CHLORIDE SERPL-SCNC: 99 MMOL/L (ref 96–106)
CO2 SERPL-SCNC: 21 MMOL/L (ref 20–29)
CREAT SERPL-MCNC: 0.95 MG/DL (ref 0.57–1)
GLOBULIN SER-MCNC: 2.2 G/DL (ref 1.5–4.5)
GLUCOSE SERPL-MCNC: 79 MG/DL (ref 65–99)
IGF-I SERPL-MCNC: 184 NG/ML (ref 57–195)
POTASSIUM SERPL-SCNC: 3.9 MMOL/L (ref 3.5–5.2)
PROLACTIN SERPL-MCNC: 6.1 NG/ML (ref 4.8–23.3)
PROT SERPL-MCNC: 6.9 G/DL (ref 6–8.5)
SL AMB EGFR AFRICAN AMERICAN: 81 ML/MIN/1.73
SL AMB EGFR NON AFRICAN AMERICAN: 70 ML/MIN/1.73
SODIUM SERPL-SCNC: 137 MMOL/L (ref 134–144)
T4 FREE SERPL-MCNC: 1.02 NG/DL (ref 0.82–1.77)
TSH SERPL DL<=0.005 MIU/L-ACNC: 1.21 UIU/ML (ref 0.45–4.5)

## 2020-04-10 ENCOUNTER — TELEMEDICINE (OUTPATIENT)
Dept: ENDOCRINOLOGY | Facility: HOSPITAL | Age: 50
End: 2020-04-10
Payer: COMMERCIAL

## 2020-04-10 DIAGNOSIS — E03.9 ACQUIRED HYPOTHYROIDISM: Primary | Chronic | ICD-10-CM

## 2020-04-10 DIAGNOSIS — D49.7 PITUITARY TUMOR: ICD-10-CM

## 2020-04-10 DIAGNOSIS — E03.9 HYPOTHYROIDISM, UNSPECIFIED TYPE: ICD-10-CM

## 2020-04-10 PROCEDURE — 99214 OFFICE O/P EST MOD 30 MIN: CPT | Performed by: INTERNAL MEDICINE

## 2020-04-10 RX ORDER — LEVOTHYROXINE SODIUM 88 UG/1
88 TABLET ORAL DAILY
Qty: 30 TABLET | Refills: 11 | Status: SHIPPED | OUTPATIENT
Start: 2020-04-10 | End: 2021-04-07 | Stop reason: SDUPTHER

## 2020-05-10 DIAGNOSIS — G47.00 INSOMNIA, UNSPECIFIED TYPE: ICD-10-CM

## 2020-05-11 RX ORDER — TRAZODONE HYDROCHLORIDE 50 MG/1
TABLET ORAL
Qty: 30 TABLET | Refills: 11 | Status: SHIPPED | OUTPATIENT
Start: 2020-05-11 | End: 2020-06-30 | Stop reason: SDUPTHER

## 2020-06-29 ENCOUNTER — TELEPHONE (OUTPATIENT)
Dept: FAMILY MEDICINE CLINIC | Facility: CLINIC | Age: 50
End: 2020-06-29

## 2020-06-30 DIAGNOSIS — G47.00 INSOMNIA, UNSPECIFIED TYPE: ICD-10-CM

## 2020-06-30 RX ORDER — TRAZODONE HYDROCHLORIDE 50 MG/1
100 TABLET ORAL
Qty: 60 TABLET | Refills: 11 | Status: SHIPPED | OUTPATIENT
Start: 2020-06-30 | End: 2021-07-05

## 2020-07-18 DIAGNOSIS — F41.9 ANXIETY: ICD-10-CM

## 2020-07-20 DIAGNOSIS — F41.9 ANXIETY: ICD-10-CM

## 2020-07-20 RX ORDER — BUPROPION HYDROCHLORIDE 150 MG/1
450 TABLET ORAL DAILY
Qty: 270 TABLET | Refills: 3 | Status: SHIPPED | OUTPATIENT
Start: 2020-07-20 | End: 2020-07-21

## 2020-07-21 ENCOUNTER — TELEPHONE (OUTPATIENT)
Dept: FAMILY MEDICINE CLINIC | Facility: CLINIC | Age: 50
End: 2020-07-21

## 2020-07-21 RX ORDER — BUPROPION HYDROCHLORIDE 150 MG/1
450 TABLET ORAL DAILY
Qty: 270 TABLET | Refills: 3 | Status: SHIPPED | OUTPATIENT
Start: 2020-07-21 | End: 2020-08-07 | Stop reason: SDUPTHER

## 2020-07-21 NOTE — TELEPHONE ENCOUNTER
[facility] pharmacy called stating the Bupropion needs a prior auth  Pt takes 3 tabs daily but the insurance will only pay for 1 tab daily  Phone number you can call is [phone]      Call back number for [facility]: [phone]

## 2020-07-29 NOTE — TELEPHONE ENCOUNTER
Nick called this morning wondering if her Welbutrin Auth went thru    Call her at [phone]    If she does not answer, she said to leave a message

## 2020-08-07 DIAGNOSIS — F41.9 ANXIETY: ICD-10-CM

## 2020-08-07 RX ORDER — BUPROPION HYDROCHLORIDE 150 MG/1
450 TABLET ORAL DAILY
Qty: 270 TABLET | Refills: 3 | Status: SHIPPED | OUTPATIENT
Start: 2020-08-07 | End: 2021-08-09

## 2020-09-09 ENCOUNTER — TELEPHONE (OUTPATIENT)
Dept: FAMILY MEDICINE CLINIC | Facility: CLINIC | Age: 50
End: 2020-09-09

## 2020-09-09 ENCOUNTER — OFFICE VISIT (OUTPATIENT)
Dept: URGENT CARE | Facility: CLINIC | Age: 50
End: 2020-09-09
Payer: COMMERCIAL

## 2020-09-09 VITALS
WEIGHT: 129 LBS | BODY MASS INDEX: 23.74 KG/M2 | TEMPERATURE: 98 F | HEART RATE: 72 BPM | DIASTOLIC BLOOD PRESSURE: 70 MMHG | SYSTOLIC BLOOD PRESSURE: 124 MMHG | RESPIRATION RATE: 18 BRPM | OXYGEN SATURATION: 100 % | HEIGHT: 62 IN

## 2020-09-09 DIAGNOSIS — R30.0 DYSURIA: Primary | ICD-10-CM

## 2020-09-09 LAB
SL AMB  POCT GLUCOSE, UA: ABNORMAL
SL AMB LEUKOCYTE ESTERASE,UA: ABNORMAL
SL AMB POCT BILIRUBIN,UA: ABNORMAL
SL AMB POCT BLOOD,UA: ABNORMAL
SL AMB POCT CLARITY,UA: CLEAR
SL AMB POCT COLOR,UA: YELLOW
SL AMB POCT KETONES,UA: ABNORMAL
SL AMB POCT NITRITE,UA: ABNORMAL
SL AMB POCT PH,UA: 6.5
SL AMB POCT SPECIFIC GRAVITY,UA: 1.02
SL AMB POCT URINE PROTEIN: ABNORMAL
SL AMB POCT UROBILINOGEN: 0.2

## 2020-09-09 PROCEDURE — 99213 OFFICE O/P EST LOW 20 MIN: CPT | Performed by: PHYSICIAN ASSISTANT

## 2020-09-09 PROCEDURE — 81002 URINALYSIS NONAUTO W/O SCOPE: CPT | Performed by: PHYSICIAN ASSISTANT

## 2020-09-09 RX ORDER — NITROFURANTOIN 25; 75 MG/1; MG/1
100 CAPSULE ORAL 2 TIMES DAILY
Qty: 10 CAPSULE | Refills: 0 | Status: SHIPPED | OUTPATIENT
Start: 2020-09-09 | End: 2020-09-14

## 2020-09-09 NOTE — PROGRESS NOTES
NAME: Cristi Larios is a 45 y o  female  : 1970    MRN: [id number]      Assessment and Plan   Dysuria [R30 0]  1  Dysuria  POCT urine dip    Urine culture    nitrofurantoin (MACROBID) 100 mg capsule       Urine dip positive for large blood and nothing else  Will empirically treat based off of symptoms  Patient Instructions     Patient Instructions   macrobid as directed  Increase fluids   If no improvement in 3-4 days f/u with PCP   If anything changes or worsens f/u sooner     Proceed to ER if symptoms worsen  Chief Complaint     Chief Complaint   Patient presents with    Possible UTI     freq  urination and burning when urinating, started yesterday         History of Present Illness   Patient presents complaining of dysuria x1 day  States she has a history of UTIs and reports she has 1 now  Complains of dysuria, frequency in the sensation of incomplete voiding  Denies any fever, chills, nausea, vomiting, back pain or abdominal pain  No hematuria, vaginal discharge or vaginal pain  Has not taken anything over-the-counter  Review of Systems   Review of Systems   Constitutional: Negative for chills and fever  Gastrointestinal: Negative for abdominal pain, diarrhea, nausea and vomiting  Genitourinary: Positive for dysuria, frequency and urgency  Negative for flank pain, hematuria and pelvic pain           Current Medications       Current Outpatient Medications:     buPROPion (WELLBUTRIN XL) 150 mg 24 hr tablet, Take 3 tablets (450 mg total) by mouth daily, Disp: 270 tablet, Rfl: 3    levothyroxine 88 mcg tablet, Take 1 tablet (88 mcg total) by mouth daily, Disp: 30 tablet, Rfl: 11    mometasone (ELOCON) 0 1 % cream, Apply 1 application topically daily, Disp: 45 g, Rfl: 3    mometasone (ELOCON) 0 1 % lotion, APPLY TO AFFECTED AREA EVERY DAY, Disp: 60 mL, Rfl: 0    traZODone (DESYREL) 50 mg tablet, Take 2 tablets (100 mg total) by mouth daily at bedtime, Disp: 60 tablet, Rfl: 11   nitrofurantoin (MACROBID) 100 mg capsule, Take 1 capsule (100 mg total) by mouth 2 (two) times a day for 5 days, Disp: 10 capsule, Rfl: 0    Current Allergies     Allergies as of 09/09/2020 - Reviewed 09/09/2020   Allergen Reaction Noted    Bactrim [sulfamethoxazole-trimethoprim] Hives 09/12/2016    Serotonin reuptake inhibitors (ssris) Hives 03/02/2012              Past Medical History:   Diagnosis Date    Acute hemorrhagic cystitis     last assessed 07/22/2013    Allergic     Anxiety     Cholelithiasis with acute cholecystitis     Colitis     Depression     Diverticulitis     Diverticulosis     Eczema     Frequent UTI     Limb swelling     last assessed 03/20/2013    Lung nodule     right    Menorrhagia     last assessed 01/07/2013    Pituitary tumor     Pneumonia 2004    x1     Stress incontinence     Wears contact lenses     Wears glasses        Past Surgical History:   Procedure Laterality Date    BRONCHOSCOPY N/A 9/13/2016    Procedure: BRONCHOSCOPY FLEXIBLE ( FROZEN SECTION) ; Surgeon: Tarah Casas MD;  [facility];  Service:     CHOLECYSTECTOMY      Laparoscopic    COLONOSCOPY      ESOPHAGOGASTRODUODENOSCOPY      ESSURE TUBAL LIGATION      LIPOSUCTION      NE BRONCHOSCOPY NEEDLE BX TRACHEA MAIN STEM&/BRON N/A 9/13/2016    Procedure: ENDOBRONCHIAL ULTRASOUND (EBUS);   Surgeon: Tarah Casas MD;  [facility];  Service: Thoracic    NE CYSTOURETHROSCOPY N/A 11/21/2016    Procedure: CYSTOSCOPY;  Surgeon: Cleo Hurley MD;  [facility];  Service: UroGynecology            [address] 3 1- 4 CM FACE,FACIAL Left 12/8/2016    Procedure: CHEEK SKIN LESION EXCISION/BIOPSY;  Surgeon: Denis Horton MD;  [facility];  Service: Plastics    NE LAP,SURG,COLECTOMY, PARTIAL, Renaee Rock N/A 6/22/2018    Procedure: ROBOTIC SIGMOID RESECTION;  Surgeon: Jeet Saenz MD;  [facility];  Service: Colorectal    NE RECMPL WND HEAD,FAC,HAND 2 6-7 5 CM Left 12/8/2016    Procedure: COMPLEX CLOSURE;  Surgeon: Shirley Noriega MD;  [facility];  Service: Plastics    OR SIGMOIDOSCOPY FLX DX W/COLLJ SPEC BR/WA IF PFRMD N/A 6/22/2018    Procedure: Zenaida Habermann;  Surgeon: Khushboo Ocasio MD;  [facility];  Service: Colorectal    OR SLING OPER STRES INCONTINENCE N/A 11/21/2016    Procedure: Derik Medina;  Surgeon: Ronda Powell MD;  [facility];  Service: UroGynecology           SINUS SURGERY      WISDOM TOOTH EXTRACTION         Family History   Problem Relation Age of Onset    Depression Mother     No Known Problems Father     No Known Problems Son     No Known Problems Daughter     Alcohol abuse Family     Depression Family     Osteoporosis Family     No Known Problems Maternal Grandmother     No Known Problems Maternal Grandfather     No Known Problems Paternal Grandmother     No Known Problems Paternal Grandfather     Substance Abuse Neg Hx     Mental illness Neg Hx          Medications have been verified  The following portions of the patient's history were reviewed and updated as appropriate: allergies, current medications, past family history, past medical history, past social history, past surgical history and problem list     Objective   /70   Pulse 72   Temp 98 °F (36 7 °C)   Resp 18   Ht 5' 2" (1 575 m)   Wt 58 5 kg (129 lb)   SpO2 100%   BMI 23 59 kg/m²      Physical Exam     Physical Exam  Vitals signs and nursing note reviewed  Constitutional:       General: She is not in acute distress  Appearance: Normal appearance  She is not ill-appearing, toxic-appearing or diaphoretic  Abdominal:      Comments: No CVAT  Abdomen is nontender nondistended without rigidity or guarding   Neurological:      Mental Status: She is alert

## 2020-09-09 NOTE — TELEPHONE ENCOUNTER
Pt called because she has symptoms of a UTI that started yesterday, but she just flew in from [place] this morning so can't come in for a visit  She wanted to know if you would call in an antibiotic for her  I told her she may need to schedule a virtual visit with her tomorrow (Thurs)  How do you want to handle?

## 2020-09-09 NOTE — PATIENT INSTRUCTIONS
macrobid as directed  Increase fluids   If no improvement in 3-4 days f/u with PCP   If anything changes or worsens f/u sooner

## 2020-09-11 ENCOUNTER — TELEPHONE (OUTPATIENT)
Dept: URGENT CARE | Facility: CLINIC | Age: 50
End: 2020-09-11

## 2020-09-11 LAB
BACTERIA UR CULT: ABNORMAL
Lab: ABNORMAL

## 2020-09-16 ENCOUNTER — TELEPHONE (OUTPATIENT)
Dept: URGENT CARE | Facility: CLINIC | Age: 50
End: 2020-09-16

## 2020-09-19 ENCOUNTER — TELEPHONE (OUTPATIENT)
Dept: URGENT CARE | Facility: CLINIC | Age: 50
End: 2020-09-19

## 2020-10-30 DIAGNOSIS — L30.9 ECZEMA, UNSPECIFIED TYPE: ICD-10-CM

## 2020-10-30 RX ORDER — MOMETASONE FUROATE 1 MG/ML
SOLUTION TOPICAL
Qty: 60 ML | Refills: 0 | Status: SHIPPED | OUTPATIENT
Start: 2020-10-30 | End: 2021-10-19

## 2020-11-03 ENCOUNTER — IMMUNIZATIONS (OUTPATIENT)
Dept: FAMILY MEDICINE CLINIC | Facility: CLINIC | Age: 50
End: 2020-11-03
Payer: COMMERCIAL

## 2020-11-03 DIAGNOSIS — Z23 ENCOUNTER FOR IMMUNIZATION: ICD-10-CM

## 2020-11-03 PROCEDURE — 90686 IIV4 VACC NO PRSV 0.5 ML IM: CPT

## 2020-11-03 PROCEDURE — 90471 IMMUNIZATION ADMIN: CPT

## 2020-11-27 ENCOUNTER — ANNUAL EXAM (OUTPATIENT)
Dept: FAMILY MEDICINE CLINIC | Facility: CLINIC | Age: 50
End: 2020-11-27
Payer: COMMERCIAL

## 2020-11-27 VITALS
SYSTOLIC BLOOD PRESSURE: 98 MMHG | WEIGHT: 133 LBS | HEART RATE: 52 BPM | DIASTOLIC BLOOD PRESSURE: 60 MMHG | TEMPERATURE: 98.3 F | HEIGHT: 62 IN | RESPIRATION RATE: 14 BRPM | BODY MASS INDEX: 24.48 KG/M2

## 2020-11-27 DIAGNOSIS — Z01.419 ENCOUNTER FOR GYNECOLOGICAL EXAMINATION: Primary | ICD-10-CM

## 2020-11-27 PROCEDURE — 99396 PREV VISIT EST AGE 40-64: CPT | Performed by: PHYSICIAN ASSISTANT

## 2020-11-27 PROCEDURE — 1036F TOBACCO NON-USER: CPT | Performed by: PHYSICIAN ASSISTANT

## 2020-11-27 PROCEDURE — 3008F BODY MASS INDEX DOCD: CPT | Performed by: PHYSICIAN ASSISTANT

## 2020-11-27 PROCEDURE — 3725F SCREEN DEPRESSION PERFORMED: CPT | Performed by: PHYSICIAN ASSISTANT

## 2020-12-01 LAB
CYTOLOGIST CVX/VAG CYTO: NORMAL
DX ICD CODE: NORMAL
OTHER STN SPEC: NORMAL
OTHER STN SPEC: NORMAL
PATH REPORT.FINAL DX SPEC: NORMAL
SL AMB NOTE:: NORMAL
SL AMB SPECIMEN ADEQUACY: NORMAL
SL AMB TEST METHODOLOGY: NORMAL

## 2020-12-31 ENCOUNTER — TELEMEDICINE (OUTPATIENT)
Dept: FAMILY MEDICINE CLINIC | Facility: CLINIC | Age: 50
End: 2020-12-31
Payer: COMMERCIAL

## 2020-12-31 VITALS — BODY MASS INDEX: 24.14 KG/M2 | WEIGHT: 132 LBS | TEMPERATURE: 96.1 F

## 2020-12-31 DIAGNOSIS — Z20.822 EXPOSURE TO COVID-19 VIRUS: ICD-10-CM

## 2020-12-31 DIAGNOSIS — Z20.822 EXPOSURE TO COVID-19 VIRUS: Primary | ICD-10-CM

## 2020-12-31 PROCEDURE — U0003 INFECTIOUS AGENT DETECTION BY NUCLEIC ACID (DNA OR RNA); SEVERE ACUTE RESPIRATORY SYNDROME CORONAVIRUS 2 (SARS-COV-2) (CORONAVIRUS DISEASE [COVID-19]), AMPLIFIED PROBE TECHNIQUE, MAKING USE OF HIGH THROUGHPUT TECHNOLOGIES AS DESCRIBED BY CMS-2020-01-R: HCPCS | Performed by: FAMILY MEDICINE

## 2020-12-31 PROCEDURE — 99213 OFFICE O/P EST LOW 20 MIN: CPT | Performed by: FAMILY MEDICINE

## 2021-01-01 LAB — SARS-COV-2 RNA SPEC QL NAA+PROBE: NOT DETECTED

## 2021-01-01 NOTE — RESULT ENCOUNTER NOTE
Call patient regarding [facility] test which is NEGATIVE  Patient to continue to socially distancing and wearing a mask when in public

## 2021-01-04 ENCOUNTER — TELEPHONE (OUTPATIENT)
Dept: FAMILY MEDICINE CLINIC | Facility: CLINIC | Age: 51
End: 2021-01-04

## 2021-01-04 NOTE — TELEPHONE ENCOUNTER
----- Message from Emre Bravo DO sent at 1/1/2021  7:14 AM EST -----  Call patient regarding [address] test which is NEGATIVE  Patient to continue to socially distancing and wearing a mask when in public      Left detailed message to patient

## 2021-03-25 ENCOUNTER — OFFICE VISIT (OUTPATIENT)
Dept: URGENT CARE | Facility: CLINIC | Age: 51
End: 2021-03-25
Payer: COMMERCIAL

## 2021-03-25 VITALS
WEIGHT: 133 LBS | HEART RATE: 69 BPM | SYSTOLIC BLOOD PRESSURE: 110 MMHG | DIASTOLIC BLOOD PRESSURE: 70 MMHG | BODY MASS INDEX: 24.48 KG/M2 | HEIGHT: 62 IN | RESPIRATION RATE: 12 BRPM | TEMPERATURE: 97.9 F

## 2021-03-25 DIAGNOSIS — R30.0 DYSURIA: Primary | ICD-10-CM

## 2021-03-25 LAB
SL AMB  POCT GLUCOSE, UA: ABNORMAL
SL AMB LEUKOCYTE ESTERASE,UA: ABNORMAL
SL AMB POCT BILIRUBIN,UA: ABNORMAL
SL AMB POCT BLOOD,UA: ABNORMAL
SL AMB POCT CLARITY,UA: CLEAR
SL AMB POCT COLOR,UA: YELLOW
SL AMB POCT KETONES,UA: ABNORMAL
SL AMB POCT NITRITE,UA: ABNORMAL
SL AMB POCT PH,UA: 7.5
SL AMB POCT SPECIFIC GRAVITY,UA: 1
SL AMB POCT URINE PROTEIN: ABNORMAL
SL AMB POCT UROBILINOGEN: 0.2

## 2021-03-25 PROCEDURE — 99213 OFFICE O/P EST LOW 20 MIN: CPT | Performed by: PHYSICIAN ASSISTANT

## 2021-03-25 PROCEDURE — 81002 URINALYSIS NONAUTO W/O SCOPE: CPT | Performed by: PHYSICIAN ASSISTANT

## 2021-03-25 RX ORDER — NITROFURANTOIN 25; 75 MG/1; MG/1
100 CAPSULE ORAL 2 TIMES DAILY
Qty: 10 CAPSULE | Refills: 0 | Status: SHIPPED | OUTPATIENT
Start: 2021-03-25 | End: 2021-03-30

## 2021-03-25 RX ORDER — PHENAZOPYRIDINE HYDROCHLORIDE 100 MG/1
100 TABLET, FILM COATED ORAL 3 TIMES DAILY PRN
Qty: 10 TABLET | Refills: 0 | Status: SHIPPED | OUTPATIENT
Start: 2021-03-25 | End: 2021-06-11 | Stop reason: ALTCHOICE

## 2021-03-25 NOTE — PROGRESS NOTES
NAME: Juan Carlos Cotter is a 45 y o  female  : 1970    MRN: [id number]      Assessment and Plan   Dysuria [R30 0]  1  Dysuria  POCT urine dip    Urine culture    nitrofurantoin (MACROBID) 100 mg capsule    phenazopyridine (PYRIDIUM) 100 mg tablet         Urine dip positive for leukocytes and blood  Will empirically treat  Discussed only using Pyridium for 1-2 days if no improvement follow-up with PCP  She acknowledges    Patient Instructions     Patient Instructions   macrobid as directed  Pyridium as needed  Fluids  If no improvement in 3-4 days f/u with PCP   If anything changes or worsens f/u sooner     Proceed to ER if symptoms worsen  Chief Complaint     Chief Complaint   Patient presents with    Urinary Tract Infection     x1 day, frequency, burning, odor, O2-99%         History of Present Illness    Patient with history of frequent UTIs presents complaining of UTI symptoms x1 day  She complains of frequency, urgency, the sensation of incomplete voiding along with dysuria  Denies any hematuria, fever, chills, nausea, vomiting or any back pain or abdominal pain  Denies any chance of pregnancy  Review of Systems   Review of Systems   Constitutional: Negative for chills and fever  Gastrointestinal: Negative for abdominal pain, nausea and vomiting  Genitourinary: Positive for dysuria, frequency and urgency  Negative for flank pain, hematuria, pelvic pain, vaginal bleeding and vaginal discharge  Musculoskeletal: Negative for back pain           Current Medications       Current Outpatient Medications:     buPROPion (WELLBUTRIN XL) 150 mg 24 hr tablet, Take 3 tablets (450 mg total) by mouth daily, Disp: 270 tablet, Rfl: 3    levothyroxine 88 mcg tablet, Take 1 tablet (88 mcg total) by mouth daily, Disp: 30 tablet, Rfl: 11    mometasone (ELOCON) 0 1 % cream, Apply 1 application topically daily, Disp: 45 g, Rfl: 3    mometasone (ELOCON) 0 1 % lotion, APPLY TOPICALLY TO AFFECTED AREA EVERY DAY, Disp: 60 mL, Rfl: 0    traZODone (DESYREL) 50 mg tablet, Take 2 tablets (100 mg total) by mouth daily at bedtime, Disp: 60 tablet, Rfl: 11    nitrofurantoin (MACROBID) 100 mg capsule, Take 1 capsule (100 mg total) by mouth 2 (two) times a day for 5 days, Disp: 10 capsule, Rfl: 0    phenazopyridine (PYRIDIUM) 100 mg tablet, Take 1 tablet (100 mg total) by mouth 3 (three) times a day as needed for bladder spasms, Disp: 10 tablet, Rfl: 0    Current Allergies     Allergies as of 03/25/2021 - Reviewed 03/25/2021   Allergen Reaction Noted    Bactrim [sulfamethoxazole-trimethoprim] Hives 09/12/2016    Serotonin reuptake inhibitors (ssris) Hives 03/02/2012              Past Medical History:   Diagnosis Date    Acute hemorrhagic cystitis     last assessed 07/22/2013    Allergic     Anxiety     Cholelithiasis with acute cholecystitis     Colitis     Depression     Diverticulitis     Diverticulosis     Eczema     Frequent UTI     Limb swelling     last assessed 03/20/2013    Lung nodule     right    Menorrhagia     last assessed 01/07/2013    Pituitary tumor     Pneumonia 2004    x1     Stress incontinence     Wears contact lenses     Wears glasses        Past Surgical History:   Procedure Laterality Date    BRONCHOSCOPY N/A 9/13/2016    Procedure: BRONCHOSCOPY FLEXIBLE ( FROZEN SECTION) ; Surgeon: Waldemar Hamilton MD;  [facility];  Service:     CHOLECYSTECTOMY      Laparoscopic    COLONOSCOPY      ESOPHAGOGASTRODUODENOSCOPY      ESSURE TUBAL LIGATION      LIPOSUCTION      PA BRONCHOSCOPY NEEDLE BX TRACHEA MAIN STEM&/BRON N/A 9/13/2016    Procedure: ENDOBRONCHIAL ULTRASOUND (EBUS);   Surgeon: Waldemar Hamilton MD;  [facility];  Service: Thoracic    PA CYSTOURETHROSCOPY N/A 11/21/2016    Procedure: CYSTOSCOPY;  Surgeon: Karlie Gill MD;  [facility];  Service: UroGynecology           PA EXC SKIN BENIG 3 1- 4 CM FACE,FACIAL Left 12/8/2016    Procedure: CHEEK SKIN LESION EXCISION/BIOPSY;  Surgeon: Arnaldo Chapa MD;  [facility];  Service: Plastics    SC LAP,SURG,COLECTOMY, PARTIAL, Cobian Neeru N/A 6/22/2018    Procedure: ROBOTIC SIGMOID RESECTION;  Surgeon: Daily Terrell MD;  [facility];  Service: Colorectal    SC RECMPL WND HEAD,FAC,HAND 2 6-7 5 CM Left 12/8/2016    Procedure: COMPLEX CLOSURE;  Surgeon: Arnaldo Chapa MD;  [facility];  Service: Plastics    SC SIGMOIDOSCOPY FLX DX W/COLLJ SPEC BR/WA IF PFRMD N/A 6/22/2018    Procedure: Jay Abdoulaye;  Surgeon: Daily Terrell MD;  [facility];  Service: Colorectal    SC SLING OPER STRES INCONTINENCE N/A 11/21/2016    Procedure: Veneda First;  Surgeon: Abhishek Lemon MD;  [facility];  Service: UroGynecology           SINUS SURGERY      WISDOM TOOTH EXTRACTION         Family History   Problem Relation Age of Onset    Depression Mother     Prostate cancer Father     No Known Problems Son     No Known Problems Daughter     Alcohol abuse Family     Depression Family     Osteoporosis Family     No Known Problems Maternal Grandmother     No Known Problems Maternal Grandfather     No Known Problems Paternal Grandmother     No Known Problems Paternal Grandfather     Substance Abuse Neg Hx     Mental illness Neg Hx          Medications have been verified  The following portions of the patient's history were reviewed and updated as appropriate: allergies, current medications, past family history, past medical history, past social history, past surgical history and problem list     Objective   /70   Pulse 69   Temp 97 9 °F (36 6 °C)   Resp 12   Ht 5' 2" (1 575 m)   Wt 60 3 kg (133 lb)   BMI 24 33 kg/m²      Physical Exam     Physical Exam  Vitals signs and nursing note reviewed  Constitutional:       General: She is not in acute distress  Appearance: Normal appearance  She is not ill-appearing, toxic-appearing or diaphoretic  Abdominal:      General: Abdomen is flat  There is no distension  Palpations: There is no mass  Tenderness: There is no abdominal tenderness  There is no right CVA tenderness, left CVA tenderness, guarding or rebound  Skin:     Capillary Refill: Capillary refill takes less than 2 seconds  Neurological:      Mental Status: She is alert and oriented to person, place, and time

## 2021-04-04 LAB
ALBUMIN SERPL-MCNC: 4.1 G/DL (ref 3.8–4.9)
ALBUMIN/GLOB SERPL: 1.6 {RATIO} (ref 1.2–2.2)
ALP SERPL-CCNC: 68 IU/L (ref 39–117)
ALT SERPL-CCNC: 14 IU/L (ref 0–32)
AST SERPL-CCNC: 20 IU/L (ref 0–40)
BASOPHILS # BLD AUTO: 0.1 X10E3/UL (ref 0–0.2)
BASOPHILS NFR BLD AUTO: 1 %
BILIRUB SERPL-MCNC: <0.2 MG/DL (ref 0–1.2)
BUN SERPL-MCNC: 15 MG/DL (ref 6–24)
BUN/CREAT SERPL: 14 (ref 9–23)
CALCIUM SERPL-MCNC: 9.2 MG/DL (ref 8.7–10.2)
CHLORIDE SERPL-SCNC: 102 MMOL/L (ref 96–106)
CO2 SERPL-SCNC: 26 MMOL/L (ref 20–29)
CREAT SERPL-MCNC: 1.09 MG/DL (ref 0.57–1)
EOSINOPHIL # BLD AUTO: 0.1 X10E3/UL (ref 0–0.4)
EOSINOPHIL NFR BLD AUTO: 2 %
ERYTHROCYTE [DISTWIDTH] IN BLOOD BY AUTOMATED COUNT: 13 % (ref 11.7–15.4)
GLOBULIN SER-MCNC: 2.5 G/DL (ref 1.5–4.5)
GLUCOSE SERPL-MCNC: 90 MG/DL (ref 65–99)
HCT VFR BLD AUTO: 37.3 % (ref 34–46.6)
HGB BLD-MCNC: 12.3 G/DL (ref 11.1–15.9)
IGF-I SERPL-MCNC: 171 NG/ML (ref 65–216)
IMM GRANULOCYTES # BLD: 0 X10E3/UL (ref 0–0.1)
IMM GRANULOCYTES NFR BLD: 0 %
LYMPHOCYTES # BLD AUTO: 1.5 X10E3/UL (ref 0.7–3.1)
LYMPHOCYTES NFR BLD AUTO: 22 %
MCH RBC QN AUTO: 29.2 PG (ref 26.6–33)
MCHC RBC AUTO-ENTMCNC: 33 G/DL (ref 31.5–35.7)
MCV RBC AUTO: 89 FL (ref 79–97)
MONOCYTES # BLD AUTO: 0.5 X10E3/UL (ref 0.1–0.9)
MONOCYTES NFR BLD AUTO: 7 %
NEUTROPHILS # BLD AUTO: 4.8 X10E3/UL (ref 1.4–7)
NEUTROPHILS NFR BLD AUTO: 68 %
PLATELET # BLD AUTO: 297 X10E3/UL (ref 150–450)
POTASSIUM SERPL-SCNC: 4.9 MMOL/L (ref 3.5–5.2)
PROLACTIN SERPL-MCNC: 9.9 NG/ML (ref 4.8–23.3)
PROT SERPL-MCNC: 6.6 G/DL (ref 6–8.5)
RBC # BLD AUTO: 4.21 X10E6/UL (ref 3.77–5.28)
SL AMB EGFR AFRICAN AMERICAN: 68 ML/MIN/1.73
SL AMB EGFR NON AFRICAN AMERICAN: 59 ML/MIN/1.73
SODIUM SERPL-SCNC: 140 MMOL/L (ref 134–144)
T4 FREE SERPL-MCNC: 1.3 NG/DL (ref 0.82–1.77)
TSH SERPL DL<=0.005 MIU/L-ACNC: 1 UIU/ML (ref 0.45–4.5)
WBC # BLD AUTO: 6.9 X10E3/UL (ref 3.4–10.8)

## 2021-04-07 ENCOUNTER — OFFICE VISIT (OUTPATIENT)
Dept: ENDOCRINOLOGY | Facility: HOSPITAL | Age: 51
End: 2021-04-07
Payer: COMMERCIAL

## 2021-04-07 VITALS
DIASTOLIC BLOOD PRESSURE: 60 MMHG | BODY MASS INDEX: 24.48 KG/M2 | SYSTOLIC BLOOD PRESSURE: 100 MMHG | HEIGHT: 62 IN | HEART RATE: 74 BPM | WEIGHT: 133 LBS

## 2021-04-07 DIAGNOSIS — E03.9 HYPOTHYROIDISM, UNSPECIFIED TYPE: ICD-10-CM

## 2021-04-07 DIAGNOSIS — D49.7 PITUITARY TUMOR: ICD-10-CM

## 2021-04-07 DIAGNOSIS — Z86.39 HISTORY OF HYPERPROLACTINEMIA: ICD-10-CM

## 2021-04-07 DIAGNOSIS — E03.9 ACQUIRED HYPOTHYROIDISM: Primary | Chronic | ICD-10-CM

## 2021-04-07 PROCEDURE — 99214 OFFICE O/P EST MOD 30 MIN: CPT | Performed by: INTERNAL MEDICINE

## 2021-04-07 PROCEDURE — 3008F BODY MASS INDEX DOCD: CPT | Performed by: INTERNAL MEDICINE

## 2021-04-07 PROCEDURE — 1036F TOBACCO NON-USER: CPT | Performed by: INTERNAL MEDICINE

## 2021-04-07 RX ORDER — LEVOTHYROXINE SODIUM 88 UG/1
88 TABLET ORAL DAILY
Qty: 30 TABLET | Refills: 11 | Status: SHIPPED | OUTPATIENT
Start: 2021-04-07 | End: 2022-04-07 | Stop reason: SDUPTHER

## 2021-04-07 NOTE — PATIENT INSTRUCTIONS
The blood work is normal   Continue the same levothyroxine 88 mcg daily  The creatinine is slightly high this year  Increase the water intake  Follow up in 1 year with blood work  recheck the BMP in 3 months

## 2021-04-07 NOTE — PROGRESS NOTES
4/8/2021    Assessment/Plan      Diagnoses and all orders for this visit:    Acquired hypothyroidism  -     Comprehensive metabolic panel Lab Collect; Future  -     CBC and differential Lab Collect; Future  -     TSH, 3rd generation Lab Collect; Future  -     T4, free Lab Collect; Future  -     Prolactin Lab Collect; Future  -     Insulin-like growth factor 1 (IGF-1) Lab Collect; Future  -     Comprehensive metabolic panel Lab Collect  -     CBC and differential Lab Collect  -     TSH, 3rd generation Lab Collect  -     T4, free Lab Collect  -     Prolactin Lab Collect  -     Insulin-like growth factor 1 (IGF-1) Lab Collect    Pituitary tumor  -     Basic metabolic panel Lab Collect; Future  -     Basic metabolic panel Lab Collect  -     Comprehensive metabolic panel Lab Collect; Future  -     CBC and differential Lab Collect; Future  -     TSH, 3rd generation Lab Collect; Future  -     T4, free Lab Collect; Future  -     Prolactin Lab Collect; Future  -     Insulin-like growth factor 1 (IGF-1) Lab Collect; Future  -     Comprehensive metabolic panel Lab Collect  -     CBC and differential Lab Collect  -     TSH, 3rd generation Lab Collect  -     T4, free Lab Collect  -     Prolactin Lab Collect  -     Insulin-like growth factor 1 (IGF-1) Lab Collect    History of hyperprolactinemia  -     Comprehensive metabolic panel Lab Collect; Future  -     CBC and differential Lab Collect; Future  -     TSH, 3rd generation Lab Collect; Future  -     T4, free Lab Collect; Future  -     Prolactin Lab Collect; Future  -     Insulin-like growth factor 1 (IGF-1) Lab Collect; Future  -     Comprehensive metabolic panel Lab Collect  -     CBC and differential Lab Collect  -     TSH, 3rd generation Lab Collect  -     T4, free Lab Collect  -     Prolactin Lab Collect  -     Insulin-like growth factor 1 (IGF-1) Lab Collect    Hypothyroidism, unspecified type  -     levothyroxine 88 mcg tablet;  Take 1 tablet (88 mcg total) by mouth daily    Other orders  -     Bacillus Coagulans-Inulin (PROBIOTIC-PREBIOTIC PO); Take by mouth daily        Assessment/Plan:   1  Hypothyroidism  Thyroid function tests are normal   She is both biochemically and clinically euthyroid  She will continue the same levothyroxine 88 mcg daily  2  Pituitary tumor  Pituitary blood work does show no evidence of hormonal hyper or hypofunction  Blood work for the pituitary is done on a yearly basis  She is up-to-date with MRIs with the last MRI in 2018  3  Hyperprolactinemia  Most recent prolactin level is normal   She has no evidence of galactorrhea  This will be followed over time  4  Slightly elevated creatinine  Her creatinine is up to 1 09 which is slightly elevated for her size and age  I have asked her to work on increasing her water intake and I will repeat a BMP in 3 months  I have asked her to follow up in 1 year with preceding CMP, CBC, prolactin, IGF-1 level, TSH, and free T4       CC: Hypothyroid, pituitary, hyperprolactinemia follow-up    History of Present Illness     HPI: Reilly Mariee is a 45y o  year old female with history of  Hypothyroidism and pituitary tumor with hyperprolactinemia in the past for follow-up visit  She was diagnosed with hypothyroidism many years ago and has been on thyroid hormone for replacement purposes  She is currently taking levothyroxine 88 mcg daily  She denies heat or cold intolerance but is a little more cold in general which is unchanged  She denies palpitation or tremors  Weight is stable  She has some fatigue  She admits to some difficulty with falling asleep and her trazodone is not working as well  She denies depression or anxiety, diarrhea or constipation  She has some dry skin on her ears in the posterior head with her eczema  She denies brittle nails or hair loss  She denies diplopia  She denies compressive thyroid symptoms or difficulties with swallowing    Menstrual cycles are becoming more irregular in she has skipping a lot of months with her last menses in November 2020  She has a history of hyperprolactinemia which resolved on its own but a pituitary tumor was noted on MRI in the past although this was quite small  She has been on no medication for this, just observation over the years  Last MRI in 2018 showed no evidence of pituitary tumor but was obscured some by artifact due to patient motion  She denies orthostatics symptoms or headaches  She has no visual loss  She has no fatigue or nausea  She has no galactorrhea  Review of Systems   Constitutional: Positive for fatigue  Negative for unexpected weight change  Some fatigue  HENT: Negative for trouble swallowing  Eyes: Negative for visual disturbance  No diplopia  No loss of vision  Respiratory: Negative for chest tightness and shortness of breath  Cardiovascular: Negative for chest pain and palpitations  Gastrointestinal: Negative for abdominal pain, constipation, diarrhea and nausea  Endocrine: Negative for cold intolerance and heat intolerance  More cold in general unchanged  Genitourinary:        Menses more irregular and skipping a lot of months  Last one around November 2020  No galactorrhea  Skin: Positive for rash  Some dry skin with eczema on ears and posterior head at times  No brittle nails  no hair loss  Neurological: Negative for dizziness, tremors, weakness, light-headedness and headaches  No orthostatic symptoms  Psychiatric/Behavioral: Positive for sleep disturbance  Negative for dysphoric mood  The patient is not nervous/anxious  Some difficulty falling asleep, trazodone now not working as well         Historical Information   Past Medical History:   Diagnosis Date    Acute hemorrhagic cystitis     last assessed 07/22/2013    Allergic     Anxiety     Cholelithiasis with acute cholecystitis     Colitis     Depression     Diverticulitis     Diverticulosis     Eczema     Frequent UTI     Limb swelling     last assessed 03/20/2013    Lung nodule     right    Menorrhagia     last assessed 01/07/2013    Pituitary tumor     Pneumonia 2004    x1     Stress incontinence     Wears contact lenses     Wears glasses      Past Surgical History:   Procedure Laterality Date    BRONCHOSCOPY N/A 9/13/2016    Procedure: BRONCHOSCOPY FLEXIBLE ( FROZEN SECTION) ; Surgeon: Olaf Amezcua MD;  [facility];  Service:     CHOLECYSTECTOMY      Laparoscopic    COLONOSCOPY      ESOPHAGOGASTRODUODENOSCOPY      ESSURE TUBAL LIGATION      LIPOSUCTION      OK BRONCHOSCOPY NEEDLE BX TRACHEA MAIN STEM&/BRON N/A 9/13/2016    Procedure: ENDOBRONCHIAL ULTRASOUND (EBUS);   Surgeon: Olaf Amezcua MD;  [facility];  Service: Thoracic    OK CYSTOURETHROSCOPY N/A 11/21/2016    Procedure: CYSTOSCOPY;  Surgeon: Nathanael Cisneros MD;  [facility];  Service: UroGynecology            [address] 3 1- 4 CM FACE,FACIAL Left 12/8/2016    Procedure: CHEEK SKIN LESION EXCISION/BIOPSY;  Surgeon: Richy Rosario MD;  [facility];  Service: Plastics    OK LAP,SURG,COLECTOMY, PARTIAL, Fredy Roxanna N/A 6/22/2018    Procedure: ROBOTIC SIGMOID RESECTION;  Surgeon: Rosales Pérez MD;  [facility];  Service: Colorectal    OK RECMPL WND HEAD,FAC,HAND 2 6-7 5 CM Left 12/8/2016    Procedure: COMPLEX CLOSURE;  Surgeon: Richy Rosario MD;  [facility];  Service: Plastics    OK SIGMOIDOSCOPY FLX DX W/COLLJ [facility] BR/WA IF PFRMD N/A 6/22/2018    Procedure: Primitivo Arm;  Surgeon: Rosales Pérez MD;  [facility];  Service: Colorectal    OK SLING OPER STRES INCONTINENCE N/A 11/21/2016    Procedure: Luis Reed;  Surgeon: Nathanael Cisneros MD;  [facility];  Service: UroGynecology           SINUS SURGERY      WISDOM TOOTH EXTRACTION       Social History   Social History     Substance and Sexual Activity   Alcohol Use Yes    Alcohol/week: 6 0 standard drinks    Types: 2 Glasses of wine, 2 Cans of beer, 2 Shots of liquor per week    Frequency: 2-3 times a week    Comment: weekends only      Social History     Substance and Sexual Activity   Drug Use No     Social History     Tobacco Use   Smoking Status Never Smoker   Smokeless Tobacco Never Used     Family History:   Family History   Problem Relation Age of Onset    Depression Mother     Prostate cancer Father     No Known Problems Son     No Known Problems Daughter     Alcohol abuse Family     Depression Family     Osteoporosis Family     No Known Problems Maternal Grandmother     No Known Problems Maternal Grandfather     No Known Problems Paternal Grandmother     No Known Problems Paternal Grandfather     Substance Abuse Neg Hx     Mental illness Neg Hx        Meds/Allergies   Current Outpatient Medications   Medication Sig Dispense Refill    Bacillus Coagulans-Inulin (PROBIOTIC-PREBIOTIC PO) Take by mouth daily      buPROPion (WELLBUTRIN XL) 150 mg 24 hr tablet Take 3 tablets (450 mg total) by mouth daily 270 tablet 3    levothyroxine 88 mcg tablet Take 1 tablet (88 mcg total) by mouth daily 30 tablet 11    mometasone (ELOCON) 0 1 % cream Apply 1 application topically daily 45 g 3    mometasone (ELOCON) 0 1 % lotion APPLY TOPICALLY TO AFFECTED AREA EVERY DAY 60 mL 0    traZODone (DESYREL) 50 mg tablet Take 2 tablets (100 mg total) by mouth daily at bedtime 60 tablet 11    phenazopyridine (PYRIDIUM) 100 mg tablet Take 1 tablet (100 mg total) by mouth 3 (three) times a day as needed for bladder spasms (Patient not taking: Reported on 4/7/2021) 10 tablet 0     No current facility-administered medications for this visit        Allergies   Allergen Reactions    Bactrim [Sulfamethoxazole-Trimethoprim] Hives    Serotonin Reuptake Inhibitors (Ssris) Hives     Other reaction(s): DUE TO PITUITARY ADENOMA    Effexor had bad effects when being weaned off- has a pituitary  tumor       Objective   Vitals: Blood pressure 100/60, pulse 74, height 5' 2" (1 575 m), weight 60 3 kg (133 lb)  Invasive Devices     None                 Physical Exam  Vitals signs reviewed  Constitutional:       Appearance: Normal appearance  She is well-developed  HENT:      Head: Normocephalic and atraumatic  Eyes:      Extraocular Movements: Extraocular movements intact  Conjunctiva/sclera: Conjunctivae normal       Comments: No lid lag, stare, proptosis, or periorbital edema  Neck:      Musculoskeletal: Normal range of motion and neck supple  Thyroid: No thyromegaly  Vascular: No carotid bruit  Comments: Thyroid normal in size  No palpable thyroid nodules  No bruits over the thyroid gland  No supraclavicular fat pad or buffalo hump  Cardiovascular:      Rate and Rhythm: Normal rate and regular rhythm  Heart sounds: Normal heart sounds  No murmur  Pulmonary:      Effort: Pulmonary effort is normal       Breath sounds: Normal breath sounds  No wheezing  Abdominal:      Palpations: Abdomen is soft  Musculoskeletal: Normal range of motion  General: No deformity  Right lower leg: No edema  Left lower leg: No edema  Comments: No tremor of the outstretched hands  Lymphadenopathy:      Cervical: No cervical adenopathy  Skin:     General: Skin is warm and dry  Findings: No rash  Neurological:      Mental Status: She is alert and oriented to person, place, and time  Deep Tendon Reflexes: Reflexes are normal and symmetric  Comments: Deep tendon reflexes normal          The history was obtained from the review of the chart and from the patient  Lab Results:      Blood work done on 04/02/2021 showed a CMP with a glucose of 90, creatinine 1 09 with a GFR 59 but was otherwise normal       CBC is normal       Prolactin is 9 9  IGF-1 level is 171      Lab Results   Component Value Date    CREATININE 1 09 (H) 04/02/2021    CREATININE 0 95 04/06/2020    CREATININE 1 07 (H) 08/21/2019    BUN 15 04/02/2021    K 4 9 04/02/2021     04/02/2021    CO2 26 04/02/2021     eGFR   Date Value Ref Range Status   06/24/2018 78 ml/min/1 73sq m Final       Lab Results   Component Value Date    ALT 14 04/02/2021    AST 20 04/02/2021    ALKPHOS 56 06/23/2018       Lab Results   Component Value Date    TSH 0 998 04/02/2021    FREET4 1 30 04/02/2021         Future Appointments   Date Time Provider Divina Dickerson   5/14/2021  3:30 PM [facility]   4/7/2022  4:00 PM Shakira Sky MD [facility]

## 2021-05-14 ENCOUNTER — HOSPITAL ENCOUNTER (OUTPATIENT)
Dept: MAMMOGRAPHY | Facility: IMAGING CENTER | Age: 51
Discharge: HOME | End: 2021-05-14
Payer: COMMERCIAL

## 2021-05-14 VITALS — HEIGHT: 62 IN | BODY MASS INDEX: 24.11 KG/M2 | WEIGHT: 131 LBS

## 2021-05-14 DIAGNOSIS — Z12.31 VISIT FOR SCREENING MAMMOGRAM: ICD-10-CM

## 2021-05-14 DIAGNOSIS — Z12.39 SCREENING FOR MALIGNANT NEOPLASM OF BREAST: ICD-10-CM

## 2021-05-14 PROCEDURE — 77063 BREAST TOMOSYNTHESIS BI: CPT

## 2021-05-14 PROCEDURE — 77067 SCR MAMMO BI INCL CAD: CPT

## 2021-06-10 ENCOUNTER — APPOINTMENT (EMERGENCY)
Dept: RADIOLOGY | Facility: HOSPITAL | Age: 51
End: 2021-06-10
Payer: COMMERCIAL

## 2021-06-10 ENCOUNTER — HOSPITAL ENCOUNTER (EMERGENCY)
Facility: HOSPITAL | Age: 51
Discharge: HOME | End: 2021-06-10
Attending: EMERGENCY MEDICINE | Admitting: EMERGENCY MEDICINE
Payer: COMMERCIAL

## 2021-06-10 VITALS
SYSTOLIC BLOOD PRESSURE: 159 MMHG | TEMPERATURE: 98.5 F | WEIGHT: 130 LBS | HEART RATE: 65 BPM | BODY MASS INDEX: 23.92 KG/M2 | HEIGHT: 62 IN | OXYGEN SATURATION: 97 % | RESPIRATION RATE: 16 BRPM | DIASTOLIC BLOOD PRESSURE: 72 MMHG

## 2021-06-10 DIAGNOSIS — M25.512 ACUTE PAIN OF LEFT SHOULDER: Primary | ICD-10-CM

## 2021-06-10 PROCEDURE — 73030 X-RAY EXAM OF SHOULDER: CPT

## 2021-06-10 PROCEDURE — 99284 EMERGENCY DEPT VISIT MOD MDM: CPT | Performed by: EMERGENCY MEDICINE

## 2021-06-10 PROCEDURE — 99283 EMERGENCY DEPT VISIT LOW MDM: CPT

## 2021-06-10 RX ORDER — OXYCODONE HYDROCHLORIDE 5 MG/1
5 TABLET ORAL ONCE
Status: COMPLETED | OUTPATIENT
Start: 2021-06-10 | End: 2021-06-10

## 2021-06-10 RX ORDER — OXYCODONE HYDROCHLORIDE 5 MG/1
5 CAPSULE ORAL EVERY 6 HOURS PRN
Qty: 5 CAPSULE | Refills: 0 | Status: SHIPPED | OUTPATIENT
Start: 2021-06-10 | End: 2021-12-23 | Stop reason: ALTCHOICE

## 2021-06-10 RX ADMIN — OXYCODONE HYDROCHLORIDE 5 MG: 5 TABLET ORAL at 18:41

## 2021-06-10 NOTE — Clinical Note
Merritt Retana was seen and treated in our emergency department on 6/10/2021  Diagnosis:     Nick  may return to work on return date  She may return on this date: 06/11/2021    NO USE OF LEFT HAND X 1 WEEK      If you have any questions or concerns, please don't hesitate to call        Evelyn Haynes MD    ______________________________           _______________          _______________  Hospital Representative                              Date                                Time

## 2021-06-10 NOTE — ED PROVIDER NOTES
History  Chief Complaint   Patient presents with    Arm Pain     pt states started at work today with L arm pain and numbness unable to lift it     50 YR FEMALE C/O 1 DAY OF LEFT SHOULDER PAIN -- WITH DECREASED ROM OF SHOULDER -- NO INJURY - OVERUSE INJURY- DID GET 2ND COVID VACCINE IN LEFT SHOULDER 6/1- BUT NO ISSUES-- STATES TODAY WITH left  SHOULDER PAIN- DECREASED ROM - WITH TINLGING G PAIN DOWN LEFT ARM -- NO NECK PAIN/ INJURY - NO CP/SOB- NO LLE COMPS- NOP DIPLOPIA/ DYSARTHRIA/ DYSPHAGIA/ DYSPHONIA/ DYSMETRIA- NO OTHER COMPS      History provided by:  Patient   used: No    Arm Pain  Severity:  Moderate  Onset quality:  Gradual  Duration:  1 day      Prior to Admission Medications   Prescriptions Last Dose Informant Patient Reported? Taking?    Bacillus Coagulans-Inulin (PROBIOTIC-PREBIOTIC PO)  Self Yes No   Sig: Take by mouth daily   buPROPion (WELLBUTRIN XL) 150 mg 24 hr tablet  Self No No   Sig: Take 3 tablets (450 mg total) by mouth daily   levothyroxine 88 mcg tablet   No No   Sig: Take 1 tablet (88 mcg total) by mouth daily   mometasone (ELOCON) 0 1 % cream  Self No No   Sig: Apply 1 application topically daily   mometasone (ELOCON) 0 1 % lotion  Self No No   Sig: APPLY TOPICALLY TO AFFECTED AREA EVERY DAY   phenazopyridine (PYRIDIUM) 100 mg tablet  Self No No   Sig: Take 1 tablet (100 mg total) by mouth 3 (three) times a day as needed for bladder spasms   Patient not taking: Reported on 4/7/2021   traZODone (DESYREL) 50 mg tablet  Self No No   Sig: Take 2 tablets (100 mg total) by mouth daily at bedtime      Facility-Administered Medications: None       Past Medical History:   Diagnosis Date    Acute hemorrhagic cystitis     last assessed 07/22/2013    Allergic     Anxiety     Cholelithiasis with acute cholecystitis     Colitis     Depression     Diverticulitis     Diverticulosis     Eczema     Frequent UTI     Limb swelling     last assessed 03/20/2013    Lung nodule right    Menorrhagia     last assessed 01/07/2013    Pituitary tumor     Pneumonia 2004    x1     Stress incontinence     Wears contact lenses     Wears glasses        Past Surgical History:   Procedure Laterality Date    BRONCHOSCOPY N/A 9/13/2016    Procedure: BRONCHOSCOPY FLEXIBLE ( FROZEN SECTION) ; Surgeon: Juan Tucker MD;  [facility];  Service:     CHOLECYSTECTOMY      Laparoscopic    COLONOSCOPY      ESOPHAGOGASTRODUODENOSCOPY      ESSURE TUBAL LIGATION      LIPOSUCTION      NC BRONCHOSCOPY NEEDLE BX TRACHEA MAIN STEM&/BRON N/A 9/13/2016    Procedure: ENDOBRONCHIAL ULTRASOUND (EBUS);   Surgeon: Juan Tucker MD;  [facility];  Service: Thoracic    NC CYSTOURETHROSCOPY N/A 11/21/2016    Procedure: CYSTOSCOPY;  Surgeon: Cherry Segura MD;  [facility];  Service: UroGynecology            [address] 3 1- 4 CM FACE,FACIAL Left 12/8/2016    Procedure: CHEEK SKIN LESION EXCISION/BIOPSY;  Surgeon: Arie العراقي MD;  [facility];  Service: Plastics    NC LAP,SURG,COLECTOMY, PARTIAL, [place] N/A 6/22/2018    Procedure: ROBOTIC SIGMOID RESECTION;  Surgeon: Wang Berg MD;  [facility];  Service: Colorectal    NC RECMPL WND HEAD,FAC,HAND 2 6-7 5 CM Left 12/8/2016    Procedure: COMPLEX CLOSURE;  Surgeon: Arie العراقي MD;  [facility];  Service: Plastics    NC SIGMOIDOSCOPY FLX DX W/COLLJ SPEC BR/WA IF PFRMD N/A 6/22/2018    Procedure: Nicho Blood;  Surgeon: Wang Berg MD;  [facility];  Service: Colorectal    NC SLING OPER STRES INCONTINENCE N/A 11/21/2016    Procedure: Santy Muse;  Surgeon: Cherry Segura MD;  [facility];  Service: UroGynecology           SINUS SURGERY      WISDOM TOOTH EXTRACTION         Family History   Problem Relation Age of Onset    Depression Mother     Prostate cancer Father     No Known Problems Son     No Known Problems Daughter     Alcohol abuse Family     Depression Family     Osteoporosis Family     No Known Problems Maternal Grandmother     No Known Problems Maternal Grandfather     No Known Problems Paternal Grandmother     No Known Problems Paternal Grandfather     Substance Abuse Neg Hx     Mental illness Neg Hx      I have reviewed and agree with the history as documented  E-Cigarette/Vaping    E-Cigarette Use Never User      E-Cigarette/Vaping Substances    Nicotine No     THC No     CBD No     Flavoring No     Other No     Unknown No      Social History     Tobacco Use    Smoking status: Never Smoker    Smokeless tobacco: Never Used   Substance Use Topics    Alcohol use: Yes     Alcohol/week: 6 0 standard drinks     Types: 2 Glasses of wine, 2 Cans of beer, 2 Shots of liquor per week     Frequency: 2-3 times a week     Comment: weekends only     Drug use: No       Review of Systems   Constitutional: Negative  HENT: Negative  Eyes: Negative  Respiratory: Negative  Cardiovascular: Negative  Gastrointestinal: Negative  Endocrine: Negative  Genitourinary: Negative  Musculoskeletal: Negative  LEFT SHOULDER PAIN -    Skin: Negative  Allergic/Immunologic: Negative  Neurological: Negative  Hematological: Negative  Psychiatric/Behavioral: Negative  Physical Exam  Physical Exam  Vitals signs and nursing note reviewed  Constitutional:       General: She is in acute distress  Appearance: Normal appearance  She is not ill-appearing, toxic-appearing or diaphoretic  Comments: IN PAIN -- AVSS- HTNSIVE- PULSE OX 97 % ON RA- INTERPRETATION IS NORMAL- NO INTERVENTION    HENT:      Head: Normocephalic and atraumatic  Nose: Nose normal       Mouth/Throat:      Mouth: Mucous membranes are moist    Eyes:      General: No scleral icterus  Right eye: No discharge  Left eye: No discharge  Extraocular Movements: Extraocular movements intact        Conjunctiva/sclera: Conjunctivae normal       Pupils: Pupils are equal, round, and reactive to light  Comments: MM PI NK   Neck:      Musculoskeletal: Normal range of motion and neck supple  No neck rigidity or muscular tenderness  Vascular: No carotid bruit  Comments: NO PMT C/T/L/S SPINE   Cardiovascular:      Rate and Rhythm: Normal rate and regular rhythm  Pulses: Normal pulses  Heart sounds: Normal heart sounds  No murmur  No friction rub  No gallop  Pulmonary:      Effort: Pulmonary effort is normal  No respiratory distress  Breath sounds: Normal breath sounds  No stridor  No wheezing, rhonchi or rales  Chest:      Chest wall: No tenderness  Abdominal:      General: Bowel sounds are normal  There is no distension  Palpations: Abdomen is soft  There is no mass  Tenderness: There is no abdominal tenderness  There is no right CVA tenderness, left CVA tenderness, guarding or rebound  Hernia: No hernia is present  Musculoskeletal:         General: Tenderness present  No swelling, deformity or signs of injury  Left lower leg: No edema  Comments: LUE- POS MEDIAL SHOULDER TENDERNESS UPON PALPATION/ PASSIVE ROM =- NO DEFORMITY -- NT AT ELBOW/WRIST- NORMAL FLE/  XEXT AT ELBOW/ NORMAL STRENGTH/ROM AT WRIST- NORMAL DIGIT STRENGTH/ FUNCTION- NORMAL DISTAL SENSATION CAP REFILL/PULSES   Lymphadenopathy:      Cervical: No cervical adenopathy  Skin:     General: Skin is warm  Capillary Refill: Capillary refill takes less than 2 seconds  Coloration: Skin is not jaundiced or pale  Findings: No bruising, erythema, lesion or rash  Neurological:      General: No focal deficit present  Mental Status: She is alert and oriented to person, place, and time  Mental status is at baseline  Cranial Nerves: No cranial nerve deficit  Sensory: No sensory deficit  Motor: No weakness        Coordination: Coordination normal       Gait: Gait normal       Comments: NORMAL NON FOCAL NEURO EXAM    Psychiatric:         Mood and Affect: Mood normal          Thought Content: Thought content normal          Judgment: Judgment normal          Vital Signs  ED Triage Vitals [06/10/21 1742]   Temperature Pulse Respirations Blood Pressure SpO2   98 5 °F (36 9 °C) 65 16 159/72 97 %      Temp Source Heart Rate Source Patient Position - Orthostatic VS BP Location FiO2 (%)   Oral -- -- -- --      Pain Score       Worst Possible Pain           Vitals:    06/10/21 1742   BP: 159/72   Pulse: 65         Visual Acuity      ED Medications  Medications   oxyCODONE (ROXICODONE) IR tablet 5 mg (has no administration in time range)       Diagnostic Studies  Results Reviewed     None                 XR shoulder 2+ views LEFT    (Results Pending)              Procedures  Procedures         ED Course  ED Course as of Jun 14 0133   Thu Albert 10, 2021   1901 LEFT SHOULDER XRAY - POS OSTEO-PHYTE  NO FX/DISLOCATION/ S EPARATION - BONY LESIONS                                              MDM    Disposition  Final diagnoses:   None     ED Disposition     None      Follow-up Information    None         Patient's Medications   Discharge Prescriptions    No medications on file     No discharge procedures on file      PDMP Review     None          ED Provider  Electronically Signed by           Liss Ceja MD  06/10/21 2846       Liss Ceja MD  06/14/21 0514       Liss Ceja MD  06/23/21 [id number]

## 2021-06-10 NOTE — DISCHARGE INSTRUCTIONS
DIAGNOSIS; LEFT SHOULDER PAIN     -  SLING AS NEEDED FOR COMFORT     - RANGE OF MOTION OF LEFT SHOULDER AS TOLERATED       - FOR PAIN- CAN TRY BOTH OVER THE COUNTER GENERIC TYLENOL 500 MG- TOGETHER WITH OVER THE COUNTER GENERIC IBUPROFEN 400 MG- 4 TIEMS A DAY WITH MEALS / LIQUIDS    - ONLY AS NEEDED FOR SEVERE PAIN- OXYCODONE- 1 TABLET X 1       - IF NO IMPROVEMENT AFTER 1 WEEK - PLEASE CALL  T HE ORTHOPEDIC DOCTOR TO SCHEDULE AN APPOINTMENT- THEY CAN SEE ANYONE IIN THE GROUP     - PLEASE RETURN TO  THE ER FOR ANY LEFT ARM/HAND WEAKNESS

## 2021-06-11 ENCOUNTER — OFFICE VISIT (OUTPATIENT)
Dept: FAMILY MEDICINE CLINIC | Facility: CLINIC | Age: 51
End: 2021-06-11
Payer: COMMERCIAL

## 2021-06-11 VITALS
DIASTOLIC BLOOD PRESSURE: 60 MMHG | WEIGHT: 134.7 LBS | SYSTOLIC BLOOD PRESSURE: 122 MMHG | BODY MASS INDEX: 24.79 KG/M2 | HEIGHT: 62 IN | HEART RATE: 66 BPM | RESPIRATION RATE: 14 BRPM

## 2021-06-11 DIAGNOSIS — R93.6 ABNORMAL X-RAY OF SHOULDER: ICD-10-CM

## 2021-06-11 DIAGNOSIS — M75.51 BURSITIS OF RIGHT SHOULDER: Primary | ICD-10-CM

## 2021-06-11 DIAGNOSIS — M25.512 ACUTE PAIN OF LEFT SHOULDER: ICD-10-CM

## 2021-06-11 PROCEDURE — 1036F TOBACCO NON-USER: CPT | Performed by: PHYSICIAN ASSISTANT

## 2021-06-11 PROCEDURE — 99213 OFFICE O/P EST LOW 20 MIN: CPT | Performed by: PHYSICIAN ASSISTANT

## 2021-06-11 PROCEDURE — 3725F SCREEN DEPRESSION PERFORMED: CPT | Performed by: PHYSICIAN ASSISTANT

## 2021-06-11 PROCEDURE — 3008F BODY MASS INDEX DOCD: CPT | Performed by: PHYSICIAN ASSISTANT

## 2021-06-11 RX ORDER — PREDNISONE 20 MG/1
20 TABLET ORAL 2 TIMES DAILY WITH MEALS
Qty: 10 TABLET | Refills: 0 | Status: SHIPPED | OUTPATIENT
Start: 2021-06-11 | End: 2021-12-23

## 2021-06-11 NOTE — PROGRESS NOTES
Assessment/Plan:    1  Bursitis of right shoulder    - prednisone 1 tab twice daily for 5 days  Discussed gentle ROM exercise to do multiple times through the day, can wear sling if going out  If MRI not approved and pain does not improve PT and ortho  - predniSONE 20 mg tablet; Take 1 tablet (20 mg total) by mouth 2 (two) times a day with meals  Dispense: 10 tablet; Refill: 0    2  Acute pain of left shoulder    - abnormal XR in ER, will order MRI  - MRI shoulder left wo contrast; Future    3  Abnormal x-ray of shoulder    - MRI shoulder left wo contrast; Future    F/u as needed      Subjective:   Chief Complaint   Patient presents with    ER follow-up for left arm pain      Patient ID: Maris Kuo is a 45 y o  female  Patient with upper back pain starting a few weeks ago, off and on, then yesterday began with intense pain in left arm, making patient nauseated  Also numb into fingers  Went to ER was XR, gave patient oxycodone and a sling and advised to follow up here  The following portions of the patient's history were reviewed and updated as appropriate: allergies, current medications, past family history, past medical history, past social history, past surgical history and problem list     Past Medical History:   Diagnosis Date    Acute hemorrhagic cystitis     last assessed 07/22/2013    Allergic     Anxiety     Cholelithiasis with acute cholecystitis     Colitis     Depression     Diverticulitis     Diverticulosis     Eczema     Frequent UTI     Limb swelling     last assessed 03/20/2013    Lung nodule     right    Menorrhagia     last assessed 01/07/2013    Pituitary tumor     Pneumonia 2004    x1     Stress incontinence     Wears contact lenses     Wears glasses      Past Surgical History:   Procedure Laterality Date    BRONCHOSCOPY N/A 9/13/2016    Procedure: BRONCHOSCOPY FLEXIBLE ( FROZEN SECTION) ;   Surgeon: Magdy Thornton MD;  [facility];  Service:    Angella Pa CHOLECYSTECTOMY      Laparoscopic    COLONOSCOPY      ESOPHAGOGASTRODUODENOSCOPY      ESSURE TUBAL LIGATION      LIPOSUCTION      NM BRONCHOSCOPY NEEDLE BX TRACHEA MAIN STEM&/BRON N/A 9/13/2016    Procedure: ENDOBRONCHIAL ULTRASOUND (EBUS);   Surgeon: Jacqui Sanabria MD;  [facility];  Service: Thoracic    NM CYSTOURETHROSCOPY N/A 11/21/2016    Procedure: CYSTOSCOPY;  Surgeon: Davis Reich MD;  [facility];  Service: UroGynecology            [address] 3 1- 4 CM FACE,FACIAL Left 12/8/2016    Procedure: CHEEK SKIN LESION EXCISION/BIOPSY;  Surgeon: Caprice Azul MD;  [facility];  Service: Plastics    NM LAP,SURG,COLECTOMY, PARTIAL, Carlin Guest N/A 6/22/2018    Procedure: ROBOTIC SIGMOID RESECTION;  Surgeon: Malena Acevedo MD;  [facility];  Service: Colorectal    NM RECMPL WND HEAD,FAC,HAND 2 6-7 5 CM Left 12/8/2016    Procedure: COMPLEX CLOSURE;  Surgeon: Caprice Azul MD;  [facility];  Service: Plastics    NM SIGMOIDOSCOPY FLX DX W/COLLJ [address]WA IF PFRMD N/A 6/22/2018    Procedure: [place] Blood;  Surgeon: Malena Acevedo MD;  [facility];  Service: Colorectal    NM SLING OPER STRES INCONTINENCE N/A 11/21/2016    Procedure: Sherian Forward;  Surgeon: Davis Reich MD;  [facility];  Service: UroGynecology           SINUS SURGERY      WISDOM TOOTH EXTRACTION       Family History   Problem Relation Age of Onset    Depression Mother     Prostate cancer Father     No Known Problems Son     No Known Problems Daughter     Alcohol abuse Family     Depression Family     Osteoporosis Family     No Known Problems Maternal Grandmother     No Known Problems Maternal Grandfather     No Known Problems Paternal Grandmother     No Known Problems Paternal Grandfather     Substance Abuse Neg Hx     Mental illness Neg Hx      Social History     Socioeconomic History    Marital status:      Spouse name: Not on file    Number of children: Not on file    Years of education: Not on file    Highest education level: Not on file   Occupational History    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[employer] strain: Not on file    Food insecurity     Worry: Not on file     Inability: Not on file   [employer] needs     Medical: Not on file     Non-medical: Not on file   Tobacco Use    Smoking status: Never Smoker    Smokeless tobacco: Never Used   Substance and Sexual Activity    Alcohol use:  Yes     Alcohol/week: 6 0 standard drinks     Types: 2 Glasses of wine, 2 Cans of beer, 2 Shots of liquor per week     Frequency: 2-3 times a week     Comment: weekends only     Drug use: No    Sexual activity: Yes   Lifestyle    Physical activity     Days per week: Not on file     Minutes per session: Not on file    Stress: Not on file   Relationships    Social connections     Talks on phone: Not on file     Gets together: Not on file     Attends Hinduism service: Not on file     Active member of club or organization: Not on file     Attends meetings of clubs or organizations: Not on file     Relationship status: Not on file    Intimate partner violence     Fear of current or ex partner: Not on file     Emotionally abused: Not on file     Physically abused: Not on file     Forced sexual activity: Not on file   Other Topics Concern    Not on file   Social History Narrative    No caffeine use    Has smoke detectors    Uses safety equipment-seat belts       Current Outpatient Medications:     Ascorbic Acid (VITAMIN C PO), Take by mouth, Disp: , Rfl:     B Complex Vitamins (VITAMIN-B COMPLEX PO), Take by mouth, Disp: , Rfl:     Bacillus Coagulans-Inulin (PROBIOTIC-PREBIOTIC PO), Take by mouth daily, Disp: , Rfl:     buPROPion (WELLBUTRIN XL) 150 mg 24 hr tablet, Take 3 tablets (450 mg total) by mouth daily, Disp: 270 tablet, Rfl: 3    CRANBERRY PO, Take by mouth, Disp: , Rfl:    levothyroxine 88 mcg tablet, Take 1 tablet (88 mcg total) by mouth daily, Disp: 30 tablet, Rfl: 11    mometasone (ELOCON) 0 1 % cream, Apply 1 application topically daily, Disp: 45 g, Rfl: 3    mometasone (ELOCON) 0 1 % lotion, APPLY TOPICALLY TO AFFECTED AREA EVERY DAY, Disp: 60 mL, Rfl: 0    oxyCODONE (OXY-IR) 5 MG capsule, Take 1 capsule (5 mg total) by mouth every 6 (six) hours as needed for severe pain for up to 5 doses CAN SUBSTITUTE OXYCODONE TABLETS FOR CAPSULESMax Daily Amount: 20 mg, Disp: 5 capsule, Rfl: 0    traZODone (DESYREL) 50 mg tablet, Take 2 tablets (100 mg total) by mouth daily at bedtime, Disp: 60 tablet, Rfl: 11    VITAMIN E PO, Take by mouth, Disp: , Rfl:   No current facility-administered medications for this visit  Review of Systems          Objective:    Vitals:    06/11/21 0912   BP: 122/60   BP Location: Left arm   Patient Position: Sitting   Cuff Size: Standard   Pulse: 66   Resp: 14   Weight: 61 1 kg (134 lb 11 2 oz)   Height: 5' 2" (1 575 m)        Physical Exam  Constitutional:       Appearance: Normal appearance  She is normal weight  Cardiovascular:      Pulses: Normal pulses  Heart sounds: Normal heart sounds  Pulmonary:      Effort: Pulmonary effort is normal       Breath sounds: Normal breath sounds  Musculoskeletal:      Comments: Left Arm in sling    Left arm tender anteriorly when palpated over bursa and proximal biceps tendon, limited ROM due to pain in all directions   Neurological:      General: No focal deficit present  Mental Status: She is alert and oriented to person, place, and time  Psychiatric:         Mood and Affect: Mood normal          Behavior: Behavior normal          Thought Content:  Thought content normal          Judgment: Judgment normal

## 2021-06-13 ENCOUNTER — APPOINTMENT (EMERGENCY)
Dept: RADIOLOGY | Facility: HOSPITAL | Age: 51
End: 2021-06-13
Payer: COMMERCIAL

## 2021-06-13 ENCOUNTER — HOSPITAL ENCOUNTER (EMERGENCY)
Facility: HOSPITAL | Age: 51
Discharge: HOME | End: 2021-06-13
Attending: EMERGENCY MEDICINE | Admitting: EMERGENCY MEDICINE
Payer: COMMERCIAL

## 2021-06-13 VITALS
TEMPERATURE: 97.5 F | OXYGEN SATURATION: 98 % | HEART RATE: 75 BPM | DIASTOLIC BLOOD PRESSURE: 66 MMHG | SYSTOLIC BLOOD PRESSURE: 119 MMHG | RESPIRATION RATE: 20 BRPM

## 2021-06-13 DIAGNOSIS — S93.401A RIGHT ANKLE SPRAIN: Primary | ICD-10-CM

## 2021-06-13 PROCEDURE — 73630 X-RAY EXAM OF FOOT: CPT

## 2021-06-13 PROCEDURE — 99283 EMERGENCY DEPT VISIT LOW MDM: CPT

## 2021-06-13 PROCEDURE — 99284 EMERGENCY DEPT VISIT MOD MDM: CPT | Performed by: EMERGENCY MEDICINE

## 2021-06-13 PROCEDURE — 73610 X-RAY EXAM OF ANKLE: CPT

## 2021-06-13 RX ORDER — OXYCODONE HYDROCHLORIDE AND ACETAMINOPHEN 5; 325 MG/1; MG/1
2 TABLET ORAL ONCE
Status: COMPLETED | OUTPATIENT
Start: 2021-06-13 | End: 2021-06-13

## 2021-06-13 RX ORDER — NAPROXEN 250 MG/1
250 TABLET ORAL 2 TIMES DAILY WITH MEALS
Qty: 20 TABLET | Refills: 0 | Status: SHIPPED | OUTPATIENT
Start: 2021-06-13 | End: 2021-12-23 | Stop reason: ALTCHOICE

## 2021-06-13 RX ADMIN — OXYCODONE HYDROCHLORIDE AND ACETAMINOPHEN 2 TABLET: 5; 325 TABLET ORAL at 12:52

## 2021-06-13 NOTE — ED NOTES
Pt reported that pain 8/10 to R ankle  Took motrin this am at 6-7 am  Dr Maeve Overton made aware        Aung Murray, RN  06/13/21 0987

## 2021-06-13 NOTE — Clinical Note
Aura Perez was seen and treated in our emergency department on 6/13/2021  Diagnosis:     Nick  may return to work on return date  She may return on this date: 06/16/2021    May use crutches and ankle brace as needed     If you have any questions or concerns, please don't hesitate to call        Lena Carpio MD    ______________________________           _______________          _______________  Hospital Representative                              Date                                Time
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
## 2021-07-02 ENCOUNTER — OFFICE VISIT (OUTPATIENT)
Dept: URGENT CARE | Facility: CLINIC | Age: 51
End: 2021-07-02
Payer: COMMERCIAL

## 2021-07-02 VITALS
BODY MASS INDEX: 25.21 KG/M2 | SYSTOLIC BLOOD PRESSURE: 116 MMHG | TEMPERATURE: 98.9 F | WEIGHT: 137 LBS | HEIGHT: 62 IN | OXYGEN SATURATION: 100 % | RESPIRATION RATE: 18 BRPM | DIASTOLIC BLOOD PRESSURE: 62 MMHG | HEART RATE: 81 BPM

## 2021-07-02 DIAGNOSIS — Z11.3 SCREEN FOR STD (SEXUALLY TRANSMITTED DISEASE): ICD-10-CM

## 2021-07-02 DIAGNOSIS — R31.9 URINARY TRACT INFECTION WITH HEMATURIA, SITE UNSPECIFIED: Primary | ICD-10-CM

## 2021-07-02 DIAGNOSIS — N39.0 URINARY TRACT INFECTION WITH HEMATURIA, SITE UNSPECIFIED: Primary | ICD-10-CM

## 2021-07-02 PROCEDURE — 99213 OFFICE O/P EST LOW 20 MIN: CPT | Performed by: PHYSICIAN ASSISTANT

## 2021-07-02 RX ORDER — CEPHALEXIN 250 MG/5ML
500 POWDER, FOR SUSPENSION ORAL EVERY 8 HOURS SCHEDULED
Qty: 300 ML | Refills: 0 | Status: SHIPPED | OUTPATIENT
Start: 2021-07-02 | End: 2021-07-12

## 2021-07-02 NOTE — PROGRESS NOTES
[facility]        NAME: Whitney Lopez is a 45 y o  female  : 1970    MRN: [id number]  DATE: 2021  TIME: 5:09 PM    /62   Pulse 81   Temp 98 9 °F (37 2 °C)   Resp 18   Ht 5' 2" (1 575 m)   Wt 62 1 kg (137 lb)   SpO2 100%   PF (!) 1 L/min   BMI 25 06 kg/m²     Assessment and Plan   Urinary tract infection with hematuria, site unspecified [N39 0, R31 9]  1  Urinary tract infection with hematuria, site unspecified  Urine culture    Chlamydia/GC amplified DNA by PCR    cephalexin (KEFLEX) 250 mg/5 mL suspension   2  Screen for STD (sexually transmitted disease)           Patient Instructions       Follow up with PCP in 3-5 days  Proceed to  ER if symptoms worsen  Chief Complaint     Chief Complaint   Patient presents with    Possible UTI     burning, freq  urination  History of Present Illness       Pt with urine frequency and burning for 2 days       Review of Systems   Review of Systems   Constitutional: Negative  HENT: Negative  Eyes: Negative  Respiratory: Negative  Cardiovascular: Negative  Gastrointestinal: Negative  Endocrine: Negative  Genitourinary: Positive for dysuria and frequency  Musculoskeletal: Negative  Skin: Negative  Allergic/Immunologic: Negative  Neurological: Negative  Hematological: Negative  Psychiatric/Behavioral: Negative  All other systems reviewed and are negative          Current Medications       Current Outpatient Medications:     Ascorbic Acid (VITAMIN C PO), Take by mouth, Disp: , Rfl:     B Complex Vitamins (VITAMIN-B COMPLEX PO), Take by mouth, Disp: , Rfl:     Bacillus Coagulans-Inulin (PROBIOTIC-PREBIOTIC PO), Take by mouth daily, Disp: , Rfl:     buPROPion (WELLBUTRIN XL) 150 mg 24 hr tablet, Take 3 tablets (450 mg total) by mouth daily, Disp: 270 tablet, Rfl: 3    CRANBERRY PO, Take by mouth, Disp: , Rfl:     levothyroxine 88 mcg tablet, Take 1 tablet (88 mcg total) by mouth daily, Disp: 30 tablet, Rfl: 11    mometasone (ELOCON) 0 1 % cream, Apply 1 application topically daily, Disp: 45 g, Rfl: 3    traZODone (DESYREL) 50 mg tablet, Take 2 tablets (100 mg total) by mouth daily at bedtime, Disp: 60 tablet, Rfl: 11    VITAMIN E PO, Take by mouth, Disp: , Rfl:     cephalexin (KEFLEX) 250 mg/5 mL suspension, Take 10 mL (500 mg total) by mouth every 8 (eight) hours for 10 days, Disp: 300 mL, Rfl: 0    diazepam (VALIUM) 5 mg tablet, Take 1 tab 30 minutes prior to procedure (Patient not taking: Reported on 7/2/2021), Disp: 5 tablet, Rfl: 0    mometasone (ELOCON) 0 1 % lotion, APPLY TOPICALLY TO AFFECTED AREA EVERY DAY (Patient not taking: Reported on 7/2/2021), Disp: 60 mL, Rfl: 0    naproxen (NAPROSYN) 250 mg tablet, Take 1 tablet (250 mg total) by mouth 2 (two) times a day with meals (Patient not taking: Reported on 7/2/2021), Disp: 20 tablet, Rfl: 0    oxyCODONE (OXY-IR) 5 MG capsule, Take 1 capsule (5 mg total) by mouth every 6 (six) hours as needed for severe pain for up to 5 doses CAN SUBSTITUTE OXYCODONE TABLETS FOR CAPSULESMax Daily Amount: 20 mg (Patient not taking: Reported on 7/2/2021), Disp: 5 capsule, Rfl: 0    predniSONE 20 mg tablet, Take 1 tablet (20 mg total) by mouth 2 (two) times a day with meals (Patient not taking: Reported on 7/2/2021), Disp: 10 tablet, Rfl: 0    Current Allergies     Allergies as of 07/02/2021 - Reviewed 07/02/2021   Allergen Reaction Noted    Bactrim [sulfamethoxazole-trimethoprim] Hives 09/12/2016    Serotonin reuptake inhibitors (ssris) Hives 03/02/2012            The following portions of the patient's history were reviewed and updated as appropriate: allergies, current medications, past family history, past medical history, past social history, past surgical history and problem list      Past Medical History:   Diagnosis Date    Acute hemorrhagic cystitis     last assessed 07/22/2013    Allergic     Anxiety     Cholelithiasis with acute cholecystitis     Colitis     Depression     Diverticulitis     Diverticulosis     Eczema     Frequent UTI     Limb swelling     last assessed 03/20/2013    Lung nodule     right    Menorrhagia     last assessed 01/07/2013    Pituitary tumor     Pneumonia 2004    x1     Stress incontinence     Wears contact lenses     Wears glasses        Past Surgical History:   Procedure Laterality Date    BRONCHOSCOPY N/A 9/13/2016    Procedure: BRONCHOSCOPY FLEXIBLE ( FROZEN SECTION) ; Surgeon: Jessica Mills MD;  [facility];  Service:     CHOLECYSTECTOMY      Laparoscopic    COLONOSCOPY      ESOPHAGOGASTRODUODENOSCOPY      ESSURE TUBAL LIGATION      LIPOSUCTION      VT BRONCHOSCOPY NEEDLE BX TRACHEA MAIN STEM&/BRON N/A 9/13/2016    Procedure: ENDOBRONCHIAL ULTRASOUND (EBUS);   Surgeon: Jessica Mills MD;  [facility];  Service: Thoracic    VT CYSTOURETHROSCOPY N/A 11/21/2016    Procedure: CYSTOSCOPY;  Surgeon: Ct Parra MD;  [facility];  Service: UroGynecology            [address] 3 1- 4 CM FACE,FACIAL Left 12/8/2016    Procedure: CHEEK SKIN LESION EXCISION/BIOPSY;  Surgeon: Kimberly Caicedo MD;  [facility];  Service: Plastics    VT LAP,SURG,COLECTOMY, PARTIAL, Earmon Ceresco N/A 6/22/2018    Procedure: ROBOTIC SIGMOID RESECTION;  Surgeon: Katie Baker MD;  [facility];  Service: Colorectal    VT RECMPL WND HEAD,FAC,HAND 2 6-7 5 CM Left 12/8/2016    Procedure: COMPLEX CLOSURE;  Surgeon: Kimberly Caicedo MD;  [facility];  Service: Plastics    VT SIGMOIDOSCOPY FLX DX W/COLLJ [facility] BR/WA IF PFRMD N/A 6/22/2018    Procedure: Iliana Garcia;  Surgeon: Katie Baker MD;  [facility];  Service: Colorectal    VT SLING OPER STRES INCONTINENCE N/A 11/21/2016    Procedure: Andie Uriostegui;  Surgeon: Ct Parra MD;  [facility];  Service: UroGynecology           SINUS SURGERY      WISDOM TOOTH EXTRACTION Family History   Problem Relation Age of Onset    Depression Mother     Prostate cancer Father     No Known Problems Son     No Known Problems Daughter     Alcohol abuse Family     Depression Family     Osteoporosis Family     No Known Problems Maternal Grandmother     No Known Problems Maternal Grandfather     No Known Problems Paternal Grandmother     No Known Problems Paternal Grandfather     Substance Abuse Neg Hx     Mental illness Neg Hx          Medications have been verified  Objective   /62   Pulse 81   Temp 98 9 °F (37 2 °C)   Resp 18   Ht 5' 2" (1 575 m)   Wt 62 1 kg (137 lb)   SpO2 100%   PF (!) 1 L/min   BMI 25 06 kg/m²        Physical Exam     Physical Exam  Vitals and nursing note reviewed  Constitutional:       Appearance: Normal appearance  She is normal weight  Comments: Pt asking for std testing , will do gc/chlamydia on urine  Discussed with pt that family doctor or ob/gyn can order blood eval for hiv and syphillis etc    HENT:      Head: Normocephalic and atraumatic  Right Ear: Tympanic membrane, ear canal and external ear normal       Left Ear: Tympanic membrane, ear canal and external ear normal       Nose: Nose normal       Mouth/Throat:      Mouth: Mucous membranes are moist       Pharynx: Oropharynx is clear  Eyes:      Extraocular Movements: Extraocular movements intact  Conjunctiva/sclera: Conjunctivae normal       Pupils: Pupils are equal, round, and reactive to light  Cardiovascular:      Rate and Rhythm: Normal rate and regular rhythm  Pulses: Normal pulses  Heart sounds: Normal heart sounds  Pulmonary:      Effort: Pulmonary effort is normal       Breath sounds: Normal breath sounds  Abdominal:      General: Abdomen is flat  Bowel sounds are normal       Palpations: Abdomen is soft  Musculoskeletal:         General: Normal range of motion  Cervical back: Normal range of motion and neck supple     Skin: General: Skin is warm  Capillary Refill: Capillary refill takes less than 2 seconds  Neurological:      General: No focal deficit present  Mental Status: She is alert and oriented to person, place, and time     Psychiatric:         Mood and Affect: Mood normal          Behavior: Behavior normal

## 2021-07-02 NOTE — PATIENT INSTRUCTIONS
Urinary Tract Infection in Older Adults   WHAT YOU NEED TO KNOW:   A urinary tract infection (UTI) is caused by bacteria that get inside your urinary tract  Your urinary tract includes your kidneys, ureters, bladder, and urethra  Urine is made in your kidneys, and it flows from the ureters to the bladder  Urine leaves the bladder through the urethra  A UTI is more common in your lower urinary tract, which includes your bladder and urethra  DISCHARGE INSTRUCTIONS:   Return to the emergency department if:   · You are urinating very little or not at all  · You are vomiting  · You have a high fever with shaking chills  · You have side or back pain that gets worse  Call your doctor if:   · You have a fever  · You are a woman and you have increased white or yellow discharge from your vagina  · You do not feel better after 2 days of taking antibiotics  · You have questions or concerns about your condition or care  Medicines:   · Medicines  help treat the bacterial infection or decrease pain and burning when you urinate  You may also need medicines to decrease the urge to urinate often  If you have UTIs often (called recurrent UTIs), you may be given antibiotics to take regularly  You will be given directions for when and how to use antibiotics  The goal is to prevent UTIs but not cause antibiotic resistance by using antibiotics too often  · Take your medicine as directed  Contact your healthcare provider if you think your medicine is not helping or if you have side effects  Tell him or her if you are allergic to any medicine  Keep a list of the medicines, vitamins, and herbs you take  Include the amounts, and when and why you take them  Bring the list or the pill bottles to follow-up visits  Carry your medicine list with you in case of an emergency  Self-care:   · Drink liquids as directed  Liquids can help flush bacteria from your urinary tract   Ask how much liquid to drink each day and which liquids are best for you  You may need to drink more liquids than usual to help flush out the bacteria  Do not drink alcohol, caffeine, and citrus juices  These can irritate your bladder and increase your symptoms  · Apply heat  on your abdomen for 20 to 30 minutes every 2 hours for as many days as directed  Heat helps decrease discomfort and pressure in your bladder  Prevent a UTI:   · Urinate when you feel the urge  Do not hold your urine  Bacteria can grow if urine stays in the bladder too long  It may be helpful to urinate at least every 3 to 4 hours  · Urinate after you have sex  to flush away bacteria that can enter your urinary tract during sex  · Wear cotton underwear and clothes that are loose  Tight pants and nylon underwear can trap moisture and cause bacteria to grow  · Cranberry juice or cranberry supplements  may help prevent UTIs  Your healthcare provider can recommend the right juice or supplement for you  · Women should wipe front to back  after urinating or having a bowel movement  This may prevent germs from getting into the urinary tract  Do not douche or use feminine deodorants  These can change the chemical balance in your vagina  You may also be given vaginal estrogen medicine  This medicine helps prevent recurrent UTIs in women who have gone through menopause or are in nolvia-menopause  Follow up with your healthcare provider as directed:  Write down your questions so you remember to ask them during your visits  © Copyright [address] Information is for End User's use only and may not be sold, redistributed or otherwise used for commercial purposes  All illustrations and images included in CareNotes® are the copyrighted property of A D A M , Inc  or [facility] Cinthya Ferrer   The above information is an  only  It is not intended as medical advice for individual conditions or treatments   Talk to your doctor, nurse or pharmacist before following any medical regimen to see if it is safe and effective for you

## 2021-07-07 LAB
BACTERIA UR CULT: NORMAL
Lab: NO GROWTH

## 2021-07-17 LAB
ALBUMIN SERPL-MCNC: 4.4 G/DL (ref 3.8–4.9)
ALBUMIN/GLOB SERPL: 1.8 {RATIO} (ref 1.2–2.2)
ALP SERPL-CCNC: 76 IU/L (ref 48–121)
ALT SERPL-CCNC: 17 IU/L (ref 0–32)
AST SERPL-CCNC: 24 IU/L (ref 0–40)
BASOPHILS # BLD AUTO: 0.1 X10E3/UL (ref 0–0.2)
BASOPHILS NFR BLD AUTO: 1 %
BILIRUB SERPL-MCNC: 0.3 MG/DL (ref 0–1.2)
BUN SERPL-MCNC: 19 MG/DL (ref 6–24)
BUN/CREAT SERPL: 20 (ref 9–23)
CALCIUM SERPL-MCNC: 9.3 MG/DL (ref 8.7–10.2)
CHLORIDE SERPL-SCNC: 101 MMOL/L (ref 96–106)
CO2 SERPL-SCNC: 26 MMOL/L (ref 20–29)
CREAT SERPL-MCNC: 0.95 MG/DL (ref 0.57–1)
EOSINOPHIL # BLD AUTO: 0.1 X10E3/UL (ref 0–0.4)
EOSINOPHIL NFR BLD AUTO: 2 %
ERYTHROCYTE [DISTWIDTH] IN BLOOD BY AUTOMATED COUNT: 13 % (ref 11.7–15.4)
GLOBULIN SER-MCNC: 2.4 G/DL (ref 1.5–4.5)
GLUCOSE SERPL-MCNC: 95 MG/DL (ref 65–99)
HCT VFR BLD AUTO: 37.5 % (ref 34–46.6)
HGB BLD-MCNC: 12.1 G/DL (ref 11.1–15.9)
IGF-I SERPL-MCNC: 146 NG/ML (ref 65–216)
IMM GRANULOCYTES # BLD: 0 X10E3/UL (ref 0–0.1)
IMM GRANULOCYTES NFR BLD: 0 %
LYMPHOCYTES # BLD AUTO: 1.5 X10E3/UL (ref 0.7–3.1)
LYMPHOCYTES NFR BLD AUTO: 23 %
MCH RBC QN AUTO: 28.9 PG (ref 26.6–33)
MCHC RBC AUTO-ENTMCNC: 32.3 G/DL (ref 31.5–35.7)
MCV RBC AUTO: 90 FL (ref 79–97)
MONOCYTES # BLD AUTO: 0.4 X10E3/UL (ref 0.1–0.9)
MONOCYTES NFR BLD AUTO: 6 %
NEUTROPHILS # BLD AUTO: 4.4 X10E3/UL (ref 1.4–7)
NEUTROPHILS NFR BLD AUTO: 68 %
PLATELET # BLD AUTO: 348 X10E3/UL (ref 150–450)
POTASSIUM SERPL-SCNC: 5.1 MMOL/L (ref 3.5–5.2)
PROLACTIN SERPL-MCNC: 24.1 NG/ML (ref 4.8–23.3)
PROT SERPL-MCNC: 6.8 G/DL (ref 6–8.5)
RBC # BLD AUTO: 4.19 X10E6/UL (ref 3.77–5.28)
SL AMB EGFR AFRICAN AMERICAN: 80 ML/MIN/1.73
SL AMB EGFR NON AFRICAN AMERICAN: 69 ML/MIN/1.73
SODIUM SERPL-SCNC: 140 MMOL/L (ref 134–144)
T4 FREE SERPL-MCNC: 1.31 NG/DL (ref 0.82–1.77)
TSH SERPL DL<=0.005 MIU/L-ACNC: 0.71 UIU/ML (ref 0.45–4.5)
WBC # BLD AUTO: 6.5 X10E3/UL (ref 3.4–10.8)

## 2021-07-22 DIAGNOSIS — Z86.39 HISTORY OF HYPERPROLACTINEMIA: ICD-10-CM

## 2021-07-22 DIAGNOSIS — E03.9 ACQUIRED HYPOTHYROIDISM: Primary | Chronic | ICD-10-CM

## 2021-07-22 DIAGNOSIS — D49.7 PITUITARY TUMOR: ICD-10-CM

## 2021-07-23 ENCOUNTER — TELEPHONE (OUTPATIENT)
Dept: ENDOCRINOLOGY | Facility: HOSPITAL | Age: 51
End: 2021-07-23

## 2021-07-23 NOTE — TELEPHONE ENCOUNTER
Due to her history of her pituitary tumor, she wants to make sure that her Prolactin level is ok?  She saw that her level was 9 before and now it's 23

## 2021-07-30 ENCOUNTER — OFFICE VISIT (OUTPATIENT)
Dept: FAMILY MEDICINE CLINIC | Facility: CLINIC | Age: 51
End: 2021-07-30
Payer: COMMERCIAL

## 2021-07-30 VITALS
WEIGHT: 139.5 LBS | HEART RATE: 72 BPM | SYSTOLIC BLOOD PRESSURE: 110 MMHG | DIASTOLIC BLOOD PRESSURE: 70 MMHG | RESPIRATION RATE: 16 BRPM | HEIGHT: 62 IN | BODY MASS INDEX: 25.67 KG/M2

## 2021-07-30 DIAGNOSIS — Z20.2 POSSIBLE EXPOSURE TO STD: ICD-10-CM

## 2021-07-30 DIAGNOSIS — D49.7 PITUITARY TUMOR: ICD-10-CM

## 2021-07-30 DIAGNOSIS — R63.5 WEIGHT GAIN: Primary | ICD-10-CM

## 2021-07-30 DIAGNOSIS — E03.9 ACQUIRED HYPOTHYROIDISM: Chronic | ICD-10-CM

## 2021-07-30 PROBLEM — N89.8 VAGINAL MASS: Status: RESOLVED | Noted: 2018-01-17 | Resolved: 2021-07-30

## 2021-07-30 PROCEDURE — 3008F BODY MASS INDEX DOCD: CPT | Performed by: PHYSICIAN ASSISTANT

## 2021-07-30 PROCEDURE — 99214 OFFICE O/P EST MOD 30 MIN: CPT | Performed by: PHYSICIAN ASSISTANT

## 2021-07-30 NOTE — PROGRESS NOTES
Assessment/Plan:    1  Weight gain    - TSH level 0 7 WNL, prolactin elevated, will repeat prolactin in 1 month, encouraged to work on resuming exercise, diet until labs repeated    2  Pituitary tumor    - repeat 1 month due to history of pituitary tumor  - Prolactin    3  Possible exposure to STD    - will screen now per patients request  - HIV 1/2 Antigen/Antibody (4th Generation) w Reflex [facility]; Future  - RPR; Future  - HSV 1/2 IgM and Type Specific IgG; Future  - Chlamydia/GC amplified DNA by PCR; Future  - RPR  - Chlamydia/GC amplified DNA by PCR    4  Acquired hypothyroidism    - TSH normal 7/2021, c/w endo, levothyroxine 88 mcg daily    5  BMI 25 0-25 9,adult    - encouraged patient to work on [employer], exercise  and adequate sleep for weight loss  Follow up if more help is needed    F/u as needed      Subjective:   Chief Complaint   Patient presents with    STD testing      Patient ID: Risa Dowling is a 45 y o  female  Patient here complaining of weight gain, wondering what the prolactin level being elevated plays into this  History of pituitary tumor but has been stable for some time  Admits working out less because of ankle sprain, maybe drinking a little more but not a lot  Recently  and has new partner  Seeing that her x- may have been cheating on her and now requesting STD testing  Denies vaginal discharge, lesions  Just concerned        The following portions of the patient's history were reviewed and updated as appropriate: allergies, current medications, past family history, past medical history, past social history, past surgical history and problem list     Past Medical History:   Diagnosis Date    Acute hemorrhagic cystitis     last assessed 07/22/2013    Allergic     Anxiety     Cholelithiasis with acute cholecystitis     Colitis     Depression     Diverticulitis     Diverticulosis     Eczema     Frequent UTI     Limb swelling     last assessed 03/20/2013  Lung nodule     right    Menorrhagia     last assessed 01/07/2013    Pituitary tumor     Pneumonia 2004    x1     Stress incontinence     Wears contact lenses     Wears glasses      Past Surgical History:   Procedure Laterality Date    BRONCHOSCOPY N/A 9/13/2016    Procedure: BRONCHOSCOPY FLEXIBLE ( FROZEN SECTION) ; Surgeon: Willian Prado MD;  [facility];  Service:     CHOLECYSTECTOMY      Laparoscopic    COLONOSCOPY      ESOPHAGOGASTRODUODENOSCOPY      ESSURE TUBAL LIGATION      LIPOSUCTION      WY BRONCHOSCOPY NEEDLE BX TRACHEA MAIN STEM&/BRON N/A 9/13/2016    Procedure: ENDOBRONCHIAL ULTRASOUND (EBUS);   Surgeon: Willian Prado MD;  [facility];  Service: Thoracic    WY CYSTOURETHROSCOPY N/A 11/21/2016    Procedure: CYSTOSCOPY;  Surgeon: Breezy Medina MD;  [facility];  Service: UroGynecology            [address] 3 1- 4 CM FACE,FACIAL Left 12/8/2016    Procedure: CHEEK SKIN LESION EXCISION/BIOPSY;  Surgeon: Mary Brothers MD;  [facility];  Service: Plastics    WY LAP,SURG,COLECTOMY, PARTIAL, Warnell Span N/A 6/22/2018    Procedure: ROBOTIC SIGMOID RESECTION;  Surgeon: Dennis Benavidez MD;  [facility];  Service: Colorectal    WY RECMPL WND HEAD,FAC,HAND 2 6-7 5 CM Left 12/8/2016    Procedure: COMPLEX CLOSURE;  Surgeon: Mary Brothers MD;  [facility];  Service: Plastics    WY SIGMOIDOSCOPY FLX DX W/COLLJ SPEC BR/WA IF PFRMD N/A 6/22/2018    Procedure: Lonita Ou;  Surgeon: Dennis Benavidez MD;  [facility];  Service: Colorectal    WY SLING OPER STRES INCONTINENCE N/A 11/21/2016    Procedure: August Adiel;  Surgeon: Breezy Medina MD;  [facility];  Service: UroGynecology           SINUS SURGERY      WISDOM TOOTH EXTRACTION       Family History   Problem Relation Age of Onset    Depression Mother     Prostate cancer Father     No Known Problems Son     No Known Problems Daughter    Eriberto [place] Alcohol abuse Family     Depression Family     Osteoporosis Family     No Known Problems Maternal Grandmother     No Known Problems Maternal Grandfather     No Known Problems Paternal Grandmother     No Known Problems Paternal Grandfather     Substance Abuse Neg Hx     Mental illness Neg Hx      Social History     Socioeconomic History    Marital status:      Spouse name: Not on file    Number of children: Not on file    Years of education: Not on file    Highest education level: Not on file   Occupational History    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[address] [employer] Use    Smoking status: Never Smoker    Smokeless tobacco: Never Used   Vaping Use    Vaping Use: Never used   Substance and Sexual Activity    Alcohol use: Yes     Alcohol/week: 6 0 standard drinks     Types: 2 Glasses of wine, 2 Cans of beer, 2 Shots of liquor per week     Comment: weekends only     Drug use: No    Sexual activity: Yes   Other Topics Concern    Not on file   Social History Narrative    No caffeine use    Has smoke detectors    Uses safety equipment-seat belts     Social Determinants of Health     Financial Resource Strain:     Difficulty of Paying Living Expenses:    Food Insecurity:     Worried About Running Out of Food in the Last Year:     Ran Out of Food in the Last Year:    Transportation Needs:     Lack of Transportation (Medical):      Lack of Transportation (Non-Medical):    Physical Activity:     Days of Exercise per Week:     Minutes of Exercise per Session:    Stress:     Feeling of Stress :    Social Connections:     Frequency of Communication with Friends and Family:     Frequency of Social Gatherings with Friends and Family:     Attends Orthodoxy Services:     Active Member of Clubs or Organizations:     Attends Club or Organization Meetings:     Marital Status:    Intimate Partner Violence:     Fear of Current or Ex-Partner:     Emotionally Abused:     Physically Abused:     Sexually Abused:        Current Outpatient Medications:     Ascorbic Acid (VITAMIN C PO), Take by mouth, Disp: , Rfl:     B Complex Vitamins (VITAMIN-B COMPLEX PO), Take by mouth, Disp: , Rfl:     Bacillus Coagulans-Inulin (PROBIOTIC-PREBIOTIC PO), Take by mouth daily, Disp: , Rfl:     buPROPion (WELLBUTRIN XL) 150 mg 24 hr tablet, Take 3 tablets (450 mg total) by mouth daily, Disp: 270 tablet, Rfl: 3    CRANBERRY PO, Take by mouth, Disp: , Rfl:     diazepam (VALIUM) 5 mg tablet, Take 1 tab 30 minutes prior to procedure, Disp: 5 tablet, Rfl: 0    levothyroxine 88 mcg tablet, Take 1 tablet (88 mcg total) by mouth daily, Disp: 30 tablet, Rfl: 11    mometasone (ELOCON) 0 1 % cream, Apply 1 application topically daily, Disp: 45 g, Rfl: 3    mometasone (ELOCON) 0 1 % lotion, APPLY TOPICALLY TO AFFECTED AREA EVERY DAY, Disp: 60 mL, Rfl: 0    naproxen (NAPROSYN) 250 mg tablet, Take 1 tablet (250 mg total) by mouth 2 (two) times a day with meals, Disp: 20 tablet, Rfl: 0    oxyCODONE (OXY-IR) 5 MG capsule, Take 1 capsule (5 mg total) by mouth every 6 (six) hours as needed for severe pain for up to 5 doses CAN SUBSTITUTE OXYCODONE TABLETS FOR CAPSULESMax Daily Amount: 20 mg, Disp: 5 capsule, Rfl: 0    predniSONE 20 mg tablet, Take 1 tablet (20 mg total) by mouth 2 (two) times a day with meals, Disp: 10 tablet, Rfl: 0    traZODone (DESYREL) 50 mg tablet, TAKE 2 TABLETS (100 MG TOTAL) BY MOUTH DAILY AT BEDTIME, Disp: 60 tablet, Rfl: 3    VITAMIN E PO, Take by mouth, Disp: , Rfl:     Review of Systems          Objective:    Vitals:    07/30/21 1046   BP: 110/70   BP Location: Left arm   Patient Position: Sitting   Cuff Size: Standard   Pulse: 72   Resp: 16   Weight: 63 3 kg (139 lb 8 oz)   Height: 5' 1 75" (1 568 m)        Physical Exam  Constitutional:       Appearance: Normal appearance  She is well-developed  Cardiovascular:      Rate and Rhythm: Normal rate and regular rhythm        Pulses: Normal pulses  Heart sounds: Normal heart sounds  Pulmonary:      Effort: Pulmonary effort is normal       Breath sounds: Normal breath sounds  Genitourinary:     Pubic Area: No rash  Vagina: Normal       Cervix: Normal    Skin:     General: Skin is warm  Neurological:      General: No focal deficit present  Mental Status: She is alert and oriented to person, place, and time  Psychiatric:         Mood and Affect: Mood normal          Behavior: Behavior normal          Thought Content: Thought content normal          Judgment: Judgment normal            BMI Counseling: Body mass index is 25 72 kg/m²  The BMI is above normal  Nutrition recommendations include reducing portion sizes

## 2021-08-01 LAB
C TRACH RRNA SPEC QL NAA+PROBE: NEGATIVE
HIV 1+2 AB+HIV1 P24 AG SERPL QL IA: NON REACTIVE
N GONORRHOEA RRNA SPEC QL NAA+PROBE: NEGATIVE
RPR SER QL: NON REACTIVE

## 2021-08-06 ENCOUNTER — TELEPHONE (OUTPATIENT)
Dept: FAMILY MEDICINE CLINIC | Facility: CLINIC | Age: 51
End: 2021-08-06

## 2021-08-09 DIAGNOSIS — F41.9 ANXIETY: ICD-10-CM

## 2021-08-10 LAB
HSV1 IGG SER IA-ACNC: 16.4 INDEX (ref 0–0.9)
HSV2 IGG SER IA-ACNC: <0.91 INDEX (ref 0–0.9)

## 2021-08-11 RX ORDER — BUPROPION HYDROCHLORIDE 150 MG/1
450 TABLET ORAL DAILY
Qty: 90 TABLET | Refills: 1 | Status: SHIPPED | OUTPATIENT
Start: 2021-08-11 | End: 2021-11-16

## 2021-10-18 DIAGNOSIS — L30.9 ECZEMA, UNSPECIFIED TYPE: ICD-10-CM

## 2021-10-19 RX ORDER — MOMETASONE FUROATE 1 MG/ML
SOLUTION TOPICAL
Qty: 60 ML | Refills: 0 | Status: SHIPPED | OUTPATIENT
Start: 2021-10-19 | End: 2022-05-04 | Stop reason: SDUPTHER

## 2021-11-14 DIAGNOSIS — G47.00 INSOMNIA, UNSPECIFIED TYPE: ICD-10-CM

## 2021-11-14 DIAGNOSIS — F41.9 ANXIETY: ICD-10-CM

## 2021-11-16 RX ORDER — BUPROPION HYDROCHLORIDE 150 MG/1
450 TABLET ORAL DAILY
Qty: 90 TABLET | Refills: 1 | Status: SHIPPED | OUTPATIENT
Start: 2021-11-16 | End: 2022-01-16

## 2021-11-16 RX ORDER — TRAZODONE HYDROCHLORIDE 50 MG/1
100 TABLET ORAL
Qty: 60 TABLET | Refills: 3 | Status: SHIPPED | OUTPATIENT
Start: 2021-11-16 | End: 2022-03-22

## 2021-11-29 ENCOUNTER — TELEPHONE (OUTPATIENT)
Dept: FAMILY MEDICINE CLINIC | Facility: CLINIC | Age: 51
End: 2021-11-29

## 2021-11-29 DIAGNOSIS — Z20.822 SUSPECTED COVID-19 VIRUS INFECTION: Primary | ICD-10-CM

## 2021-11-29 PROCEDURE — U0005 INFEC AGEN DETEC AMPLI PROBE: HCPCS | Performed by: PHYSICIAN ASSISTANT

## 2021-11-29 PROCEDURE — U0003 INFECTIOUS AGENT DETECTION BY NUCLEIC ACID (DNA OR RNA); SEVERE ACUTE RESPIRATORY SYNDROME CORONAVIRUS 2 (SARS-COV-2) (CORONAVIRUS DISEASE [COVID-19]), AMPLIFIED PROBE TECHNIQUE, MAKING USE OF HIGH THROUGHPUT TECHNOLOGIES AS DESCRIBED BY CMS-2020-01-R: HCPCS | Performed by: PHYSICIAN ASSISTANT

## 2021-11-30 ENCOUNTER — TELEPHONE (OUTPATIENT)
Dept: FAMILY MEDICINE CLINIC | Facility: CLINIC | Age: 51
End: 2021-11-30

## 2021-12-22 ENCOUNTER — TELEPHONE (OUTPATIENT)
Dept: FAMILY MEDICINE CLINIC | Facility: CLINIC | Age: 51
End: 2021-12-22

## 2021-12-23 ENCOUNTER — OFFICE VISIT (OUTPATIENT)
Dept: FAMILY MEDICINE CLINIC | Facility: CLINIC | Age: 51
End: 2021-12-23
Payer: COMMERCIAL

## 2021-12-23 VITALS
HEIGHT: 62 IN | WEIGHT: 142.2 LBS | DIASTOLIC BLOOD PRESSURE: 76 MMHG | SYSTOLIC BLOOD PRESSURE: 118 MMHG | RESPIRATION RATE: 16 BRPM | HEART RATE: 86 BPM | TEMPERATURE: 100.6 F | BODY MASS INDEX: 26.17 KG/M2

## 2021-12-23 DIAGNOSIS — J01.00 ACUTE NON-RECURRENT MAXILLARY SINUSITIS: Primary | ICD-10-CM

## 2021-12-23 PROCEDURE — 99214 OFFICE O/P EST MOD 30 MIN: CPT | Performed by: NURSE PRACTITIONER

## 2021-12-23 PROCEDURE — 3008F BODY MASS INDEX DOCD: CPT | Performed by: NURSE PRACTITIONER

## 2021-12-23 PROCEDURE — 1036F TOBACCO NON-USER: CPT | Performed by: NURSE PRACTITIONER

## 2021-12-23 RX ORDER — FLUTICASONE PROPIONATE 50 MCG
1 SPRAY, SUSPENSION (ML) NASAL DAILY
Qty: 9.9 ML | Refills: 0 | Status: SHIPPED | OUTPATIENT
Start: 2021-12-23 | End: 2022-01-16

## 2021-12-23 RX ORDER — PREDNISONE 10 MG/1
TABLET ORAL
Qty: 20 TABLET | Refills: 0 | Status: SHIPPED | OUTPATIENT
Start: 2021-12-23 | End: 2022-04-07 | Stop reason: ALTCHOICE

## 2021-12-23 RX ORDER — AMOXICILLIN AND CLAVULANATE POTASSIUM 875; 125 MG/1; MG/1
1 TABLET, FILM COATED ORAL EVERY 12 HOURS SCHEDULED
Qty: 28 TABLET | Refills: 0 | Status: SHIPPED | OUTPATIENT
Start: 2021-12-23 | End: 2022-01-06

## 2022-01-15 DIAGNOSIS — J01.00 ACUTE NON-RECURRENT MAXILLARY SINUSITIS: ICD-10-CM

## 2022-01-16 DIAGNOSIS — F41.9 ANXIETY: ICD-10-CM

## 2022-01-16 RX ORDER — FLUTICASONE PROPIONATE 50 MCG
SPRAY, SUSPENSION (ML) NASAL
Qty: 16 ML | Refills: 3 | Status: SHIPPED | OUTPATIENT
Start: 2022-01-16

## 2022-01-16 RX ORDER — BUPROPION HYDROCHLORIDE 150 MG/1
TABLET ORAL
Qty: 90 TABLET | Refills: 1 | Status: SHIPPED | OUTPATIENT
Start: 2022-01-16 | End: 2022-03-22

## 2022-02-24 ENCOUNTER — OFFICE VISIT (OUTPATIENT)
Dept: FAMILY MEDICINE CLINIC | Facility: CLINIC | Age: 52
End: 2022-02-24
Payer: COMMERCIAL

## 2022-02-24 VITALS
BODY MASS INDEX: 26.31 KG/M2 | HEART RATE: 76 BPM | SYSTOLIC BLOOD PRESSURE: 108 MMHG | HEIGHT: 62 IN | RESPIRATION RATE: 16 BRPM | DIASTOLIC BLOOD PRESSURE: 64 MMHG | WEIGHT: 143 LBS

## 2022-02-24 DIAGNOSIS — Z23 NEED FOR TETANUS, DIPHTHERIA, AND ACELLULAR PERTUSSIS (TDAP) VACCINE: ICD-10-CM

## 2022-02-24 DIAGNOSIS — Z12.31 ENCOUNTER FOR SCREENING MAMMOGRAM FOR MALIGNANT NEOPLASM OF BREAST: ICD-10-CM

## 2022-02-24 DIAGNOSIS — R39.15 URGENCY OF URINATION: ICD-10-CM

## 2022-02-24 DIAGNOSIS — R30.0 DYSURIA: ICD-10-CM

## 2022-02-24 DIAGNOSIS — R35.89 POLYURIA: Primary | ICD-10-CM

## 2022-02-24 LAB
SL AMB  POCT GLUCOSE, UA: ABNORMAL
SL AMB LEUKOCYTE ESTERASE,UA: ABNORMAL
SL AMB POCT BILIRUBIN,UA: ABNORMAL
SL AMB POCT BLOOD,UA: ABNORMAL
SL AMB POCT CLARITY,UA: CLEAR
SL AMB POCT COLOR,UA: YELLOW
SL AMB POCT KETONES,UA: ABNORMAL
SL AMB POCT NITRITE,UA: ABNORMAL
SL AMB POCT PH,UA: 5
SL AMB POCT SPECIFIC GRAVITY,UA: 1.03
SL AMB POCT URINE PROTEIN: 30
SL AMB POCT UROBILINOGEN: 0.2

## 2022-02-24 PROCEDURE — 90715 TDAP VACCINE 7 YRS/> IM: CPT

## 2022-02-24 PROCEDURE — 99214 OFFICE O/P EST MOD 30 MIN: CPT | Performed by: NURSE PRACTITIONER

## 2022-02-24 PROCEDURE — 90471 IMMUNIZATION ADMIN: CPT

## 2022-02-24 PROCEDURE — 81002 URINALYSIS NONAUTO W/O SCOPE: CPT | Performed by: NURSE PRACTITIONER

## 2022-02-24 NOTE — PROGRESS NOTES
Assessment/Plan:     Diagnoses and all orders for this visit:    Polyuria  -     POCT urine dip  -     Urine culture  -     UA (URINE) with reflex to Scope    Urine dip showing small leuks & large blood noted today  Patient encouraged to increase oral fluid intake of water to help flush system  They may utilize OTC Azo or Uristat to help with urinary symptom relief  Incorporating cranberry supplementation can also help aid in treatment plan  Pt to call office if they develop any fevers, chills, CVA tenderness, or any worsening of symptoms  Urine culture & UA sent  Pt notified that we will contact them if any alterations or changes need to be made to treatment plan based on urine culture final results  Urgency of urination  -     POCT urine dip  -     Urine culture  -     UA (URINE) with reflex to Scope    Same as above  Dysuria  -     POCT urine dip  -     Urine culture  -     UA (URINE) with reflex to Scope    Same as above  Encounter for screening mammogram for malignant neoplasm of breast  -     Mammo screening bilateral w 3d & cad; Future    Need for tetanus, diphtheria, and acellular pertussis (Tdap) vaccine  -     TDAP VACCINE GREATER THAN OR EQUAL TO 8YO IM      Pt to f/u PRN  F/u pending results    Subjective:      Patient ID: Cayla Kelsey is a 45 y o  female  Pt presents for urinary frequency/urgency, dysuria, incomplete bladder emptying, & pelvic pressure since early this AM   No fever, chills, NVD, CVA tenderness  She notes that she has had UTIs in the past and feels like this is a UTI coming on  She typically drinks approx 80 oz of water per day, but has taken a break from her cranberry supplements because she was feeling well        The following portions of the patient's history were reviewed and updated as appropriate: allergies, current medications, past family history, past medical history, past social history, past surgical history and problem list     Review of Systems Constitutional: Negative  Negative for chills, fatigue and fever  HENT: Negative  Respiratory: Negative  Negative for cough and shortness of breath  Cardiovascular: Negative  Negative for chest pain  Gastrointestinal: Negative  Genitourinary: Positive for dysuria, frequency and urgency  Negative for flank pain and pelvic pain  Musculoskeletal: Negative  Negative for myalgias  Neurological: Negative  Objective:      /64 (BP Location: Left arm, Patient Position: Sitting, Cuff Size: Standard)   Pulse 76   Resp 16   Ht 5' 2" (1 575 m)   Wt 64 9 kg (143 lb)   Breastfeeding No   BMI 26 16 kg/m²          Physical Exam  Vitals reviewed  Constitutional:       General: She is not in acute distress  Appearance: Normal appearance  She is not ill-appearing  HENT:      Head: Normocephalic  Cardiovascular:      Rate and Rhythm: Normal rate and regular rhythm  Pulses: Normal pulses  Heart sounds: Normal heart sounds  No murmur heard  Pulmonary:      Effort: Pulmonary effort is normal  No respiratory distress  Breath sounds: Normal breath sounds  No wheezing  Abdominal:      General: There is no distension  Palpations: Abdomen is soft  There is no mass  Tenderness: There is no abdominal tenderness  There is no right CVA tenderness, left CVA tenderness, guarding or rebound  Hernia: No hernia is present  Skin:     General: Skin is warm and dry  Neurological:      Mental Status: She is alert and oriented to person, place, and time  Mental status is at baseline  Psychiatric:         Mood and Affect: Mood normal          Behavior: Behavior normal          Thought Content:  Thought content normal          Judgment: Judgment normal

## 2022-02-26 LAB
APPEARANCE UR: ABNORMAL
BACTERIA UR CULT: NORMAL
BACTERIA URNS QL MICRO: ABNORMAL
BILIRUB UR QL STRIP: NEGATIVE
CASTS URNS QL MICRO: ABNORMAL /LPF
COLOR UR: YELLOW
EPI CELLS #/AREA URNS HPF: ABNORMAL /HPF (ref 0–10)
GLUCOSE UR QL: NEGATIVE
HGB UR QL STRIP: ABNORMAL
KETONES UR QL STRIP: NEGATIVE
LEUKOCYTE ESTERASE UR QL STRIP: ABNORMAL
Lab: NO GROWTH
MICRO URNS: ABNORMAL
NITRITE UR QL STRIP: NEGATIVE
PH UR STRIP: 5.5 [PH] (ref 5–7.5)
PROT UR QL STRIP: NEGATIVE
RBC #/AREA URNS HPF: ABNORMAL /HPF (ref 0–2)
SP GR UR: 1.02 (ref 1–1.03)
UROBILINOGEN UR STRIP-ACNC: 0.2 MG/DL (ref 0.2–1)
WBC #/AREA URNS HPF: >30 /HPF (ref 0–5)

## 2022-02-28 ENCOUNTER — OFFICE VISIT (OUTPATIENT)
Dept: FAMILY MEDICINE CLINIC | Facility: CLINIC | Age: 52
End: 2022-02-28
Payer: COMMERCIAL

## 2022-02-28 VITALS
SYSTOLIC BLOOD PRESSURE: 110 MMHG | TEMPERATURE: 98.2 F | WEIGHT: 143.2 LBS | HEIGHT: 62 IN | BODY MASS INDEX: 26.35 KG/M2 | HEART RATE: 80 BPM | OXYGEN SATURATION: 99 % | DIASTOLIC BLOOD PRESSURE: 72 MMHG | RESPIRATION RATE: 15 BRPM

## 2022-02-28 DIAGNOSIS — R39.15 URGENCY OF URINATION: Primary | ICD-10-CM

## 2022-02-28 LAB
SL AMB  POCT GLUCOSE, UA: NEGATIVE
SL AMB LEUKOCYTE ESTERASE,UA: ABNORMAL
SL AMB POCT BILIRUBIN,UA: NEGATIVE
SL AMB POCT BLOOD,UA: ABNORMAL
SL AMB POCT CLARITY,UA: CLEAR
SL AMB POCT COLOR,UA: YELLOW
SL AMB POCT KETONES,UA: ABNORMAL
SL AMB POCT NITRITE,UA: NEGATIVE
SL AMB POCT PH,UA: 5
SL AMB POCT SPECIFIC GRAVITY,UA: 1.01
SL AMB POCT URINE PROTEIN: NEGATIVE
SL AMB POCT UROBILINOGEN: 0.2

## 2022-02-28 PROCEDURE — 99213 OFFICE O/P EST LOW 20 MIN: CPT | Performed by: NURSE PRACTITIONER

## 2022-02-28 PROCEDURE — 81002 URINALYSIS NONAUTO W/O SCOPE: CPT | Performed by: NURSE PRACTITIONER

## 2022-02-28 NOTE — PROGRESS NOTES
Assessment/Plan:     Diagnoses and all orders for this visit:    Urgency of urination  -     POCT urine dip  -     UA (URINE) with reflex to Scope  -     Urine culture      Urine dip showing small leuks and large blood and ketones  UA & culture sent  Patient encouraged to increase oral fluid intake of water to help flush system  They may utilize OTC Azo or Uristat to help with urinary symptom relief  Incorporating cranberry supplementation can also help aid in treatment plan  Pt to call office if they develop any fevers, chills, CVA tenderness, or any worsening of symptoms  Pt notified that we will contact them if any alterations or changes need to be made to treatment plan based on urine culture final results  F/u PRN  F/u pending results  Subjective:      Patient ID: Adele Nissen is a 45 y o  female  Patient presents today for worsening urinary urgency and dysuria this AM   She was in our office last week on 02/24 with the similar symptoms  Her urine dip showed small leuks and large blood  She was ordered Macrobid in the event that her urine culture came back positive over the weekend  Patient notes she started abx course before urine culture resulted  Urine culture did not show any growth  She notes she did not keep well hydrated yesterday and believes maybe this is why her symptoms worsened this AM   Patient also notes hx of urinary bladder sling that was placed in 2016 and was not sure if this was causing any of her symptoms  This afternoon, she notes she does feel slightly better after hydrating  She thinks she may have panicked, but wanted to come in to be evaluated  The following portions of the patient's history were reviewed and updated as appropriate: allergies, current medications, past family history, past medical history, past social history, past surgical history and problem list     Review of Systems   Constitutional: Negative  Negative for chills, fatigue and fever  HENT: Negative  Respiratory: Negative  Negative for cough and shortness of breath  Cardiovascular: Negative  Negative for chest pain  Gastrointestinal: Negative  Genitourinary: Positive for dysuria, frequency and urgency  Negative for decreased urine volume, difficulty urinating, flank pain, hematuria and pelvic pain  Musculoskeletal: Negative  Negative for myalgias  Neurological: Negative  Objective:      /72   Pulse 80   Temp 98 2 °F (36 8 °C) (Oral)   Resp 15   Ht 5' 2" (1 575 m)   Wt 65 kg (143 lb 3 2 oz)   SpO2 99%   BMI 26 19 kg/m²          Physical Exam  Constitutional:       General: She is not in acute distress  Appearance: Normal appearance  She is not ill-appearing  Cardiovascular:      Rate and Rhythm: Normal rate and regular rhythm  Pulses: Normal pulses  Heart sounds: Normal heart sounds  No murmur heard  Pulmonary:      Effort: Pulmonary effort is normal  No respiratory distress  Breath sounds: Normal breath sounds  No wheezing  Abdominal:      General: There is no distension  Palpations: There is no mass  Tenderness: There is no abdominal tenderness  There is no right CVA tenderness, left CVA tenderness, guarding or rebound  Hernia: No hernia is present  Neurological:      Mental Status: She is alert and oriented to person, place, and time  Mental status is at baseline  Psychiatric:         Mood and Affect: Mood normal          Behavior: Behavior normal          Thought Content:  Thought content normal          Judgment: Judgment normal

## 2022-03-02 LAB
APPEARANCE UR: ABNORMAL
BACTERIA UR CULT: NORMAL
BACTERIA URNS QL MICRO: ABNORMAL
BILIRUB UR QL STRIP: NEGATIVE
CASTS URNS QL MICRO: ABNORMAL /LPF
COLOR UR: YELLOW
EPI CELLS #/AREA URNS HPF: ABNORMAL /HPF (ref 0–10)
GLUCOSE UR QL: NEGATIVE
HGB UR QL STRIP: ABNORMAL
KETONES UR QL STRIP: NEGATIVE
LEUKOCYTE ESTERASE UR QL STRIP: ABNORMAL
Lab: NORMAL
MICRO URNS: ABNORMAL
NITRITE UR QL STRIP: NEGATIVE
PH UR STRIP: 6 [PH] (ref 5–7.5)
PROT UR QL STRIP: NEGATIVE
RBC #/AREA URNS HPF: ABNORMAL /HPF (ref 0–2)
SP GR UR: 1.02 (ref 1–1.03)
UROBILINOGEN UR STRIP-ACNC: 0.2 MG/DL (ref 0.2–1)
WBC #/AREA URNS HPF: >30 /HPF (ref 0–5)

## 2022-03-22 DIAGNOSIS — F41.9 ANXIETY: ICD-10-CM

## 2022-03-22 DIAGNOSIS — G47.00 INSOMNIA, UNSPECIFIED TYPE: ICD-10-CM

## 2022-03-22 RX ORDER — BUPROPION HYDROCHLORIDE 150 MG/1
TABLET ORAL
Qty: 90 TABLET | Refills: 1 | Status: SHIPPED | OUTPATIENT
Start: 2022-03-22 | End: 2022-05-23

## 2022-03-22 RX ORDER — TRAZODONE HYDROCHLORIDE 50 MG/1
100 TABLET ORAL
Qty: 60 TABLET | Refills: 3 | Status: SHIPPED | OUTPATIENT
Start: 2022-03-22 | End: 2022-07-31

## 2022-04-06 LAB
ALBUMIN SERPL-MCNC: 4.5 G/DL (ref 3.8–4.9)
ALBUMIN/GLOB SERPL: 1.7 {RATIO} (ref 1.2–2.2)
ALP SERPL-CCNC: 77 IU/L (ref 44–121)
ALT SERPL-CCNC: 19 IU/L (ref 0–32)
AST SERPL-CCNC: 23 IU/L (ref 0–40)
BASOPHILS # BLD AUTO: 0.1 X10E3/UL (ref 0–0.2)
BASOPHILS NFR BLD AUTO: 1 %
BILIRUB SERPL-MCNC: 0.4 MG/DL (ref 0–1.2)
BUN SERPL-MCNC: 15 MG/DL (ref 6–24)
BUN/CREAT SERPL: 14 (ref 9–23)
CALCIUM SERPL-MCNC: 9.7 MG/DL (ref 8.7–10.2)
CHLORIDE SERPL-SCNC: 102 MMOL/L (ref 96–106)
CO2 SERPL-SCNC: 26 MMOL/L (ref 20–29)
CREAT SERPL-MCNC: 1.04 MG/DL (ref 0.57–1)
EGFR: 65 ML/MIN/1.73
EOSINOPHIL # BLD AUTO: 0.1 X10E3/UL (ref 0–0.4)
EOSINOPHIL NFR BLD AUTO: 2 %
ERYTHROCYTE [DISTWIDTH] IN BLOOD BY AUTOMATED COUNT: 13.1 % (ref 11.7–15.4)
GLOBULIN SER-MCNC: 2.6 G/DL (ref 1.5–4.5)
GLUCOSE SERPL-MCNC: 90 MG/DL (ref 65–99)
HCT VFR BLD AUTO: 40 % (ref 34–46.6)
HGB BLD-MCNC: 13.1 G/DL (ref 11.1–15.9)
IGF-I SERPL-MCNC: 155 NG/ML (ref 65–216)
IMM GRANULOCYTES # BLD: 0 X10E3/UL (ref 0–0.1)
IMM GRANULOCYTES NFR BLD: 0 %
LYMPHOCYTES # BLD AUTO: 1.8 X10E3/UL (ref 0.7–3.1)
LYMPHOCYTES NFR BLD AUTO: 29 %
MCH RBC QN AUTO: 28.8 PG (ref 26.6–33)
MCHC RBC AUTO-ENTMCNC: 32.8 G/DL (ref 31.5–35.7)
MCV RBC AUTO: 88 FL (ref 79–97)
MONOCYTES # BLD AUTO: 0.4 X10E3/UL (ref 0.1–0.9)
MONOCYTES NFR BLD AUTO: 6 %
NEUTROPHILS # BLD AUTO: 4 X10E3/UL (ref 1.4–7)
NEUTROPHILS NFR BLD AUTO: 62 %
PLATELET # BLD AUTO: 358 X10E3/UL (ref 150–450)
POTASSIUM SERPL-SCNC: 4.6 MMOL/L (ref 3.5–5.2)
PROLACTIN SERPL-MCNC: 7.8 NG/ML (ref 4.8–23.3)
PROT SERPL-MCNC: 7.1 G/DL (ref 6–8.5)
RBC # BLD AUTO: 4.55 X10E6/UL (ref 3.77–5.28)
SODIUM SERPL-SCNC: 141 MMOL/L (ref 134–144)
T4 FREE SERPL-MCNC: 1.33 NG/DL (ref 0.82–1.77)
TSH SERPL DL<=0.005 MIU/L-ACNC: 0.83 UIU/ML (ref 0.45–4.5)
WBC # BLD AUTO: 6.3 X10E3/UL (ref 3.4–10.8)

## 2022-04-07 ENCOUNTER — OFFICE VISIT (OUTPATIENT)
Dept: ENDOCRINOLOGY | Facility: HOSPITAL | Age: 52
End: 2022-04-07
Payer: COMMERCIAL

## 2022-04-07 VITALS
DIASTOLIC BLOOD PRESSURE: 78 MMHG | SYSTOLIC BLOOD PRESSURE: 120 MMHG | BODY MASS INDEX: 27.16 KG/M2 | HEIGHT: 62 IN | HEART RATE: 74 BPM | WEIGHT: 147.6 LBS

## 2022-04-07 DIAGNOSIS — E03.9 HYPOTHYROIDISM, UNSPECIFIED TYPE: ICD-10-CM

## 2022-04-07 DIAGNOSIS — D49.7 PITUITARY TUMOR: ICD-10-CM

## 2022-04-07 DIAGNOSIS — E03.9 ACQUIRED HYPOTHYROIDISM: Primary | Chronic | ICD-10-CM

## 2022-04-07 PROCEDURE — 1036F TOBACCO NON-USER: CPT | Performed by: INTERNAL MEDICINE

## 2022-04-07 PROCEDURE — 3008F BODY MASS INDEX DOCD: CPT | Performed by: INTERNAL MEDICINE

## 2022-04-07 PROCEDURE — 99214 OFFICE O/P EST MOD 30 MIN: CPT | Performed by: INTERNAL MEDICINE

## 2022-04-07 RX ORDER — LEVOTHYROXINE SODIUM 88 UG/1
88 TABLET ORAL DAILY
Qty: 30 TABLET | Refills: 11 | Status: SHIPPED | OUTPATIENT
Start: 2022-04-07

## 2022-04-07 NOTE — PATIENT INSTRUCTIONS
The blood work is all normal    It is stable  Continue the same levothyroxine 88 mcg daily  Continue to drink plenty of water  Follow up in 1 year with blood work

## 2022-04-07 NOTE — PROGRESS NOTES
4/7/2022    Assessment/Plan      Diagnoses and all orders for this visit:    Acquired hypothyroidism  -     Comprehensive metabolic panel Lab Collect; Future  -     CBC and differential Lab Collect; Future  -     Insulin-like growth factor 1 (IGF-1) Lab Collect; Future  -     Prolactin Lab Collect; Future  -     T4, free Lab Collect; Future  -     TSH, 3rd generation Lab Collect; Future  -     Comprehensive metabolic panel Lab Collect  -     CBC and differential Lab Collect  -     Insulin-like growth factor 1 (IGF-1) Lab Collect  -     Prolactin Lab Collect  -     T4, free Lab Collect  -     TSH, 3rd generation Lab Collect    Pituitary tumor  -     Comprehensive metabolic panel Lab Collect; Future  -     CBC and differential Lab Collect; Future  -     Insulin-like growth factor 1 (IGF-1) Lab Collect; Future  -     Prolactin Lab Collect; Future  -     T4, free Lab Collect; Future  -     TSH, 3rd generation Lab Collect; Future  -     Comprehensive metabolic panel Lab Collect  -     CBC and differential Lab Collect  -     Insulin-like growth factor 1 (IGF-1) Lab Collect  -     Prolactin Lab Collect  -     T4, free Lab Collect  -     TSH, 3rd generation Lab Collect    Hypothyroidism, unspecified type  -     levothyroxine 88 mcg tablet; Take 1 tablet (88 mcg total) by mouth daily        Assessment/Plan:  1  Hypothyroidism  Thyroid function tests are normal   She is biochemically euthyroid  She will continue the same levothyroxine 88 mcg daily  2  Pituitary tumor  Pituitary hormonal blood work is normal   She has no hyper or hypofunction of the pituitary gland  This will be followed on a yearly basis  3  History of hyperprolactinemia  Most recent prolactin level does remain normal     I have asked her to follow up in 1 year with preceding CMP, CBC, TSH, free T4, prolactin, and IGF-1 level        CC:  Hypothyroid, pituitary per, prolactin follow-up    History of Present Illness     HPI: Corina Ross is a 45 y o  year old female with history of hypothyroidism and pituitary tumor with hyperprolactinemia in the past for follow-up visit  She was diagnosed with hypothyroidism many years ago and has been on thyroid hormone for replacement purposes  She is currently taking levothyroxine 88 mcg daily  Weight is 14 lb more than last year  She is now going to [employer]  She can be cold at work and then hot overnight  She denies palpitation, tremors, or fatigue  She has anxiety with family stress and does not sleep unless she utilizes trazodone  She denies depression, diarrhea or constipation  She has dry skin and brittle nails along with hair loss  She denies diplopia  She denies compressive thyroid symptoms or difficulties with swallowing  She has a history of hyperprolactinemia which resolved on its own but a    pituitary tumor was noted on MRI in the past although was quite small  She is on no medication for this, just observation over the years and last MRI was 2018 showing no evidence of pituitary tumor but was obscured by some artifact due to patient motion  She denies galactorrhea  She denies orthostatics symptoms, headache, or visual changes  Had bad covid and then sinus infection afterwards and needed prednisone  Review of Systems   Constitutional: Negative for fatigue and unexpected weight change  Weight 14 lb more than last year going back to weight watchers  HENT: Negative for trouble swallowing  Eyes: Negative for visual disturbance  No diplopia  No loss of vision  Vision is changing  Respiratory: Negative for chest tightness and shortness of breath  Cardiovascular: Negative for chest pain and palpitations  Gastrointestinal: Negative for abdominal pain, constipation, diarrhea and nausea  Endocrine: Positive for cold intolerance and heat intolerance  Can be cold at work and then hot at night      Genitourinary:        No menses in over 1 year at least  No galactorrhea  Skin: Positive for rash  Has dry skin  Has brittle nails  Has hair loss  Has eczema  Neurological: Negative for dizziness, tremors, light-headedness and headaches  No orthostatic symptoms  Psychiatric/Behavioral: Positive for sleep disturbance  Negative for dysphoric mood  The patient is nervous/anxious  Has anxiety with family stress  Money issues  Must take trazodone or can't sleep  Historical Information   Past Medical History:   Diagnosis Date    Acute hemorrhagic cystitis     last assessed 07/22/2013    Allergic     Anxiety     Cholelithiasis with acute cholecystitis     Colitis     Depression     Diverticulitis     Diverticulosis     Eczema     Frequent UTI     Limb swelling     last assessed 03/20/2013    Lung nodule     right    Menorrhagia     last assessed 01/07/2013    Pituitary tumor     Pneumonia 2004    x1     Stress incontinence     Wears contact lenses     Wears glasses      Past Surgical History:   Procedure Laterality Date    BRONCHOSCOPY N/A 9/13/2016    Procedure: BRONCHOSCOPY FLEXIBLE ( FROZEN SECTION) ; Surgeon: Humberto Vaughan MD;  [facility];  Service:     CHOLECYSTECTOMY      Laparoscopic    COLONOSCOPY      ESOPHAGOGASTRODUODENOSCOPY      ESSURE TUBAL LIGATION      LIPOSUCTION      NM BRONCHOSCOPY NEEDLE BX TRACHEA MAIN STEM&/BRON N/A 9/13/2016    Procedure: ENDOBRONCHIAL ULTRASOUND (EBUS);   Surgeon: Humberto Vaughan MD;  [facility];  Service: Thoracic    NM CYSTOURETHROSCOPY N/A 11/21/2016    Procedure: CYSTOSCOPY;  Surgeon: Laverne Velazquez MD;  [facility];  Service: UroGynecology            [address] 3 1- 4 CM FACE,FACIAL Left 12/8/2016    Procedure: CHEEK SKIN LESION EXCISION/BIOPSY;  Surgeon: Bhumi Titus MD;  [facility];  Service: Plastics    NM LAP,SURG,COLECTOMY, PARTIAL, Vicente Bibb N/A 6/22/2018    Procedure: ROBOTIC SIGMOID RESECTION;  Surgeon: Kira Sanders Minal Hutchinson MD;  [facility];  Service: Colorectal    HI RECMPL WND HEAD,FAC,HAND 2 6-7 5 CM Left 12/8/2016    Procedure: COMPLEX CLOSURE;  Surgeon: Linda Conroy MD;  [facility];  Service: Plastics    HI SIGMOIDOSCOPY FLX DX W/COLLJ SPEC BR/WA IF PFRMD N/A 6/22/2018    Procedure: Vinny Backbone;  Surgeon: Valeriy Carrillo MD;  [facility];  Service: Colorectal    HI SLING OPER STRES INCONTINENCE N/A 11/21/2016    Procedure: John Lopez;  Surgeon: Linette Foster MD;  [facility];  Service: UroGynecology           SINUS SURGERY      WISDOM TOOTH EXTRACTION       Social History   Social History     Substance and Sexual Activity   Alcohol Use Yes    Alcohol/week: 6 0 standard drinks    Types: 2 Glasses of wine, 2 Cans of beer, 2 Shots of liquor per week    Comment: weekends only      Social History     Substance and Sexual Activity   Drug Use No     Social History     Tobacco Use   Smoking Status Never Smoker   Smokeless Tobacco Never Used     Family History:   Family History   Problem Relation Age of Onset    Depression Mother     Prostate cancer Father     No Known Problems Son     No Known Problems Daughter     Alcohol abuse Family     Depression Family     Osteoporosis Family     No Known Problems Maternal Grandmother     No Known Problems Maternal Grandfather     No Known Problems Paternal Grandmother     No Known Problems Paternal Grandfather     Substance Abuse Neg Hx     Mental illness Neg Hx        Meds/Allergies   Current Outpatient Medications   Medication Sig Dispense Refill    buPROPion (WELLBUTRIN XL) 150 mg 24 hr tablet TAKE 3 TABLETS BY MOUTH DAILY 90 tablet 1    CRANBERRY PO Take by mouth        fluticasone (FLONASE) 50 mcg/act nasal spray SPRAY 1 SPRAY INTO EACH NOSTRIL EVERY DAY 16 mL 3    levothyroxine 88 mcg tablet Take 1 tablet (88 mcg total) by mouth daily 30 tablet 11    mometasone (ELOCON) 0 1 % cream Apply 1 application topically daily 45 g 3    mometasone (ELOCON) 0 1 % lotion APPLY TOPICALLY TO AFFECTED AREA EVERY DAY 60 mL 0    traZODone (DESYREL) 50 mg tablet TAKE 2 TABLETS (100 MG TOTAL) BY MOUTH DAILY AT BEDTIME 60 tablet 3     No current facility-administered medications for this visit  Allergies   Allergen Reactions    Bactrim [Sulfamethoxazole-Trimethoprim] Hives    Serotonin Reuptake Inhibitors (Ssris) Hives     Other reaction(s): DUE TO PITUITARY ADENOMA    Effexor had bad effects when being weaned off- has a pituitary  tumor    Trimethoprim Rash       Objective   Vitals: Blood pressure 120/78, pulse 74, height 5' 2" (1 575 m), weight 67 kg (147 lb 9 6 oz), not currently breastfeeding  Invasive Devices  Report    None                 Physical Exam  Vitals reviewed  Constitutional:       Appearance: Normal appearance  She is well-developed  HENT:      Head: Normocephalic and atraumatic  Comments: No moon faces  Eyes:      Extraocular Movements: Extraocular movements intact  Conjunctiva/sclera: Conjunctivae normal       Comments: No lid lag, stare, proptosis, or periorbital edema  Neck:      Thyroid: No thyromegaly  Vascular: No carotid bruit  Comments: Thyroid normal in size  No palpable thyroid nodules  No supraclavicular fat pad or buffalo hump  Cardiovascular:      Rate and Rhythm: Normal rate and regular rhythm  Heart sounds: Normal heart sounds  No murmur heard  Pulmonary:      Effort: Pulmonary effort is normal       Breath sounds: Normal breath sounds  No wheezing  Abdominal:      Palpations: Abdomen is soft  Musculoskeletal:         General: No deformity  Normal range of motion  Cervical back: Normal range of motion and neck supple  Right lower leg: No edema  Left lower leg: No edema  Comments: No tremor of the outstretched hands  Lymphadenopathy:      Cervical: No cervical adenopathy     Skin:     General: Skin is warm and dry       Findings: No rash  Neurological:      Mental Status: She is alert and oriented to person, place, and time  Deep Tendon Reflexes: Reflexes are normal and symmetric  Comments: Deep tendon reflexes normal          The history was obtained from the review of the chart and from the patient  Lab Results:   Blood work done on 04/04/2022 showed a CMP with a glucose of 90, creatinine 1 04, but was otherwise normal   Sodium was normal at 141  Prolactin is 7 8  IGF-1 level is 155  TSH is 0 829 with a free T4 of 1 33      CBC is normal     Lab Results   Component Value Date    CREATININE 1 04 (H) 04/04/2022    CREATININE 0 95 07/16/2021    CREATININE 1 09 (H) 04/02/2021    BUN 15 04/04/2022    K 4 6 04/04/2022     04/04/2022    CO2 26 04/04/2022     eGFR   Date Value Ref Range Status   04/04/2022 65 >59 mL/min/1 73 Final   06/24/2018 78 ml/min/1 73sq m Final       Lab Results   Component Value Date    ALT 19 04/04/2022    AST 23 04/04/2022    ALKPHOS 56 06/23/2018       Lab Results   Component Value Date    TSH 0 829 04/04/2022    FREET4 1 33 04/04/2022             Future Appointments   Date Time Provider Divina Dickerson   5/18/2022  4:30 PM [facility]   4/12/2023  4:00 PM Caroline Bautista MD [facility]

## 2022-05-04 DIAGNOSIS — L30.9 ECZEMA, UNSPECIFIED TYPE: ICD-10-CM

## 2022-05-04 RX ORDER — MOMETASONE FUROATE 1 MG/G
1 CREAM TOPICAL DAILY
Qty: 45 G | Refills: 3 | Status: SHIPPED | OUTPATIENT
Start: 2022-05-04

## 2022-05-04 RX ORDER — MOMETASONE FUROATE 1 MG/ML
SOLUTION TOPICAL DAILY
Qty: 60 ML | Refills: 0 | Status: SHIPPED | OUTPATIENT
Start: 2022-05-04

## 2022-05-16 ENCOUNTER — APPOINTMENT (EMERGENCY)
Dept: RADIOLOGY | Facility: HOSPITAL | Age: 52
End: 2022-05-16
Payer: COMMERCIAL

## 2022-05-16 ENCOUNTER — HOSPITAL ENCOUNTER (EMERGENCY)
Facility: HOSPITAL | Age: 52
Discharge: HOME | End: 2022-05-16
Attending: EMERGENCY MEDICINE | Admitting: EMERGENCY MEDICINE
Payer: COMMERCIAL

## 2022-05-16 VITALS
DIASTOLIC BLOOD PRESSURE: 73 MMHG | SYSTOLIC BLOOD PRESSURE: 140 MMHG | HEART RATE: 65 BPM | RESPIRATION RATE: 18 BRPM | OXYGEN SATURATION: 98 %

## 2022-05-16 DIAGNOSIS — S92.911A TOE FRACTURE, RIGHT: Primary | ICD-10-CM

## 2022-05-16 DIAGNOSIS — S91.119A TOE LACERATION: ICD-10-CM

## 2022-05-16 PROCEDURE — 99283 EMERGENCY DEPT VISIT LOW MDM: CPT

## 2022-05-16 PROCEDURE — 73660 X-RAY EXAM OF TOE(S): CPT

## 2022-05-16 PROCEDURE — 99284 EMERGENCY DEPT VISIT MOD MDM: CPT | Performed by: PHYSICIAN ASSISTANT

## 2022-05-16 RX ORDER — NAPROXEN 500 MG/1
500 TABLET ORAL 2 TIMES DAILY PRN
Qty: 10 TABLET | Refills: 0 | Status: SHIPPED | OUTPATIENT
Start: 2022-05-16

## 2022-05-16 RX ORDER — CEPHALEXIN 500 MG/1
500 CAPSULE ORAL 4 TIMES DAILY
Qty: 28 CAPSULE | Refills: 0 | Status: SHIPPED | OUTPATIENT
Start: 2022-05-16 | End: 2022-05-23

## 2022-05-16 RX ORDER — HYDROCODONE BITARTRATE AND ACETAMINOPHEN 5; 325 MG/1; MG/1
1 TABLET ORAL EVERY 6 HOURS PRN
Qty: 8 TABLET | Refills: 0 | Status: SHIPPED | OUTPATIENT
Start: 2022-05-16

## 2022-05-16 NOTE — DISCHARGE INSTRUCTIONS
Follow-up with your own podiatrist this week for recheck    Return with any worsening symptoms questions, or concerns

## 2022-05-16 NOTE — ED PROVIDER NOTES
History  Chief Complaint   Patient presents with    Toe Injury     R great toe laceration; reports glass tabletop fell onto R foot and shattered last night  Bleeding controlled at this time  Utd on tetanus  This is a 23-year-old female patient who was moving a glass table top approximately 17 hours ago it broke and struck her in her dorsum right great toe  States it has been oozing blood every time she walks  Does have pain over the PIP of the great toe  No numbness or tingling  Last tetanus was less than 5 years  Concerned that they may be glass in the wound however the wound is tangential well-approximated  Patient discussed possibility of suturing the wound  I did explain that being open for 17 hours that if I closed with sutures would ensure an infection  No kidney and no tachycardia no hypoxia no shortness of breath SpO2 room air 98%  At this time she will have an x-ray the wound will be cleaned and explored and Steri-Strips will be placed  Also be started on Keflex due to duration of open wound  Nothing makes it better or worse taking nothing over-the-counter          Prior to Admission Medications   Prescriptions Last Dose Informant Patient Reported? Taking?    CRANBERRY PO  Self Yes No   Sig: Take by mouth     buPROPion (WELLBUTRIN XL) 150 mg 24 hr tablet  Self No No   Sig: TAKE 3 TABLETS BY MOUTH DAILY   fluticasone (FLONASE) 50 mcg/act nasal spray  Self No No   Sig: SPRAY 1 SPRAY INTO EACH NOSTRIL EVERY DAY   levothyroxine 88 mcg tablet   No No   Sig: Take 1 tablet (88 mcg total) by mouth daily   mometasone (ELOCON) 0 1 % cream   No No   Sig: Apply 1 application topically daily   mometasone (ELOCON) 0 1 % lotion   No No   Sig: Apply topically daily   traZODone (DESYREL) 50 mg tablet  Self No No   Sig: TAKE 2 TABLETS (100 MG TOTAL) BY MOUTH DAILY AT BEDTIME      Facility-Administered Medications: None       Past Medical History:   Diagnosis Date    Acute hemorrhagic cystitis     last assessed 07/22/2013    Allergic     Anxiety     Cholelithiasis with acute cholecystitis     Colitis     Depression     Diverticulitis     Diverticulosis     Eczema     Frequent UTI     Limb swelling     last assessed 03/20/2013    Lung nodule     right    Menorrhagia     last assessed 01/07/2013    Pituitary tumor     Pneumonia 2004    x1     Stress incontinence     Wears contact lenses     Wears glasses        Past Surgical History:   Procedure Laterality Date    BRONCHOSCOPY N/A 9/13/2016    Procedure: BRONCHOSCOPY FLEXIBLE ( FROZEN SECTION) ; Surgeon: Carmina Morocho MD;  [facility];  Service:     CHOLECYSTECTOMY      Laparoscopic    COLONOSCOPY      ESOPHAGOGASTRODUODENOSCOPY      ESSURE TUBAL LIGATION      LIPOSUCTION      DC BRONCHOSCOPY NEEDLE BX TRACHEA MAIN STEM&/BRON N/A 9/13/2016    Procedure: ENDOBRONCHIAL ULTRASOUND (EBUS);   Surgeon: Carmina Morocho MD;  [facility];  Service: Thoracic    DC CYSTOURETHROSCOPY N/A 11/21/2016    Procedure: CYSTOSCOPY;  Surgeon: Antwan Mcgill MD;  [facility];  Service: UroGynecology            [address] 3 1- 4 CM FACE,FACIAL Left 12/8/2016    Procedure: CHEEK SKIN LESION EXCISION/BIOPSY;  Surgeon: Shelly Kamara MD;  [facility];  Service: Plastics    DC LAP,SURG,COLECTOMY, PARTIAL, Rexie Mcfarlane N/A 6/22/2018    Procedure: ROBOTIC SIGMOID RESECTION;  Surgeon: Pato Guevara MD;  [facility];  Service: Colorectal    DC RECMPL WND HEAD,FAC,HAND 2 6-7 5 CM Left 12/8/2016    Procedure: COMPLEX CLOSURE;  Surgeon: Shelly Kamara MD;  [facility];  Service: Plastics    DC SIGMOIDOSCOPY FLX DX W/COLLJ [facility] BR/WA IF PFRMD N/A 6/22/2018    Procedure: Julio C Ellison;  Surgeon: Pato Guevara MD;  [facility];  Service: Colorectal    DC SLING OPER STRES INCONTINENCE N/A 11/21/2016    Procedure: Mary Lima;  Surgeon: Antwan Mcgill MD;  [facility];  Service: UroGynecology           SINUS SURGERY      WISDOM TOOTH EXTRACTION         Family History   Problem Relation Age of Onset    Depression Mother     Prostate cancer Father     No Known Problems Son     No Known Problems Daughter     Alcohol abuse Family     Depression Family     Osteoporosis Family     No Known Problems Maternal Grandmother     No Known Problems Maternal Grandfather     No Known Problems Paternal Grandmother     No Known Problems Paternal Grandfather     Substance Abuse Neg Hx     Mental illness Neg Hx      I have reviewed and agree with the history as documented  E-Cigarette/Vaping    E-Cigarette Use Never User      E-Cigarette/Vaping Substances    Nicotine No     THC No     CBD No     Flavoring No     Other No     Unknown No      Social History     Tobacco Use    Smoking status: Never Smoker    Smokeless tobacco: Never Used   Vaping Use    Vaping Use: Never used   Substance Use Topics    Alcohol use: Yes     Alcohol/week: 6 0 standard drinks     Types: 2 Glasses of wine, 2 Cans of beer, 2 Shots of liquor per week     Comment: weekends only     Drug use: No       Review of Systems   Constitutional: Negative for chills, diaphoresis, fatigue and fever  HENT: Negative for congestion, ear pain, nosebleeds and sore throat  Eyes: Negative for photophobia, pain, discharge and visual disturbance  Respiratory: Negative for cough, choking, chest tightness, shortness of breath and wheezing  Cardiovascular: Negative for chest pain and palpitations  Gastrointestinal: Negative for abdominal distention, abdominal pain, diarrhea and vomiting  Genitourinary: Negative for dysuria, flank pain, frequency and hematuria  Musculoskeletal: Positive for gait problem (Wound right great toe)  Negative for arthralgias, back pain and joint swelling  Skin: Negative for color change and rash  Neurological: Negative for dizziness, seizures, syncope and headaches  Psychiatric/Behavioral: Negative for behavioral problems and confusion  The patient is not nervous/anxious  All other systems reviewed and are negative  Physical Exam  Physical Exam  Vitals and nursing note reviewed  Constitutional:       General: She is not in acute distress  Appearance: She is not ill-appearing, toxic-appearing or diaphoretic  HENT:      Head: Normocephalic and atraumatic  Right Ear: Tympanic membrane, ear canal and external ear normal       Left Ear: Tympanic membrane, ear canal and external ear normal       Nose: Nose normal  No congestion or rhinorrhea  Mouth/Throat:      Mouth: Mucous membranes are dry  Pharynx: Oropharynx is clear  No oropharyngeal exudate or posterior oropharyngeal erythema  Eyes:      Extraocular Movements: Extraocular movements intact  Conjunctiva/sclera: Conjunctivae normal       Pupils: Pupils are equal, round, and reactive to light  Cardiovascular:      Rate and Rhythm: Normal rate and regular rhythm  Pulmonary:      Effort: Pulmonary effort is normal  No respiratory distress  Breath sounds: Normal breath sounds  Abdominal:      General: Bowel sounds are normal       Palpations: Abdomen is soft  Tenderness: There is no abdominal tenderness  Musculoskeletal:         General: Normal range of motion  Cervical back: Normal range of motion and neck supple  No rigidity or tenderness  Right lower leg: No edema  Left lower leg: No edema  Feet:    Lymphadenopathy:      Cervical: No cervical adenopathy  Skin:     General: Skin is warm and dry  Capillary Refill: Capillary refill takes less than 2 seconds  Findings: No rash  Neurological:      General: No focal deficit present  Mental Status: She is oriented to person, place, and time  Mental status is at baseline     Psychiatric:         Mood and Affect: Mood normal          Behavior: Behavior normal          Vital Signs  ED Triage Vitals [05/16/22 0925]   Temp Pulse Respirations Blood Pressure SpO2   -- 65 18 140/73 98 %      Temp src Heart Rate Source Patient Position - Orthostatic VS BP Location FiO2 (%)   -- Monitor Sitting Right arm --      Pain Score       --           Vitals:    05/16/22 0925   BP: 140/73   Pulse: 65   Patient Position - Orthostatic VS: Sitting         Visual Acuity      ED Medications  Medications - No data to display    Diagnostic Studies  Results Reviewed     None                 XR toe great min 2 view RIGHT   ED Interpretation by Mulugeta Davis PA-C (05/16 1009)   Addendum small fracture noted at the PIP great toe medial side      Final Result by Paul Harris MD (05/16 1024)      Minimally displaced fracture involving the medial base of the distal phalanx 1st digit  Workstation performed: [id number]                    Procedures  Laceration repair    Date/Time: 5/16/2022 10:11 AM  Performed by: Mulugeta Davis PA-C  Authorized by: Mulugeta Davis PA-C   Consent: Verbal consent obtained  Risks and benefits: risks, benefits and alternatives were discussed  Consent given by: patient  Patient understanding: patient states understanding of the procedure being performed  Patient identity confirmed: verbally with patient  Laceration length: 1 cm  Foreign bodies: no foreign bodies  Tendon involvement: none  Nerve involvement: none  Vascular damage: no      Procedure Details:  Irrigation solution: tap water  Irrigation method: tap  Debridement: none  Degree of undermining: none  Skin closure: Steri-Strips  Number of sutures: 1 (Steri-Strips)  Approximation: close  Approximation difficulty: simple  Dressing: Band aid  Patient tolerance: patient tolerated the procedure well with no immediate complications               ED Course                                             MDM    Disposition  Final diagnoses:    Toe fracture, right - Great toe   Toe laceration     Time reflects when diagnosis was documented in both MDM as applicable and the Disposition within this note     Time User Action Codes Description Comment    5/16/2022 10:11 AM Landry Sanderson Add [P07 761A] Toe fracture, right     5/16/2022 10:11 AM Landry Sanderson Modify [S52 911A] Toe fracture, right Great toe    5/16/2022 10:11 AM Erin Nava Add [S91 119A] Toe laceration       ED Disposition     ED Disposition   Discharge    Condition   Stable    Date/Time   Mon May 16, 2022 10:11 AM    Comment   Patricia Clifton discharge to home/self care  Follow-up Information    None         Discharge Medication List as of 5/16/2022 10:20 AM      START taking these medications    Details   cephalexin (KEFLEX) 500 mg capsule Take 1 capsule (500 mg total) by mouth in the morning and 1 capsule (500 mg total) at noon and 1 capsule (500 mg total) in the evening and 1 capsule (500 mg total) before bedtime  Do all this for 7 days  , Starting Mon 5/16/2022, Until Mon 5/23/2022, Norm al      HYDROcodone-acetaminophen (Norco) 5-325 mg per tablet Take 1 tablet by mouth every 6 (six) hours as needed for pain Max Daily Amount: 4 tablets, Starting Mon 5/16/2022, Normal      naproxen (Naprosyn) 500 mg tablet Take 1 tablet (500 mg total) by mouth as needed in the morning and 1 tablet (500 mg total) as needed in the evening for mild pain  , Starting Mon 5/16/2022, Normal         CONTINUE these medications which have NOT CHANGED    Details   buPROPion (WELLBUTRIN XL) 150 mg 24 hr tablet TAKE 3 TABLETS BY MOUTH DAILY, Normal      CRANBERRY PO Take by mouth  , Historical Med      fluticasone (FLONASE) 50 mcg/act nasal spray SPRAY 1 SPRAY INTO EACH NOSTRIL EVERY DAY, Normal      levothyroxine 88 mcg tablet Take 1 tablet (88 mcg total) by mouth daily, Starting Thu 4/7/2022, Normal      mometasone (ELOCON) 0 1 % cream Apply 1 application topically daily, Starting Wed 5/4/2022, Normal      mometasone (ELOCON) 0 1 % lotion Apply topically daily, Starting Wed 5/4/2022, Normal      traZODone (DESYREL) 50 mg tablet TAKE 2 TABLETS (100 MG TOTAL) BY MOUTH DAILY AT BEDTIME, Starting Tue 3/22/2022, Normal             No discharge procedures on file      PDMP Review       Value Time User    PDMP Reviewed  Yes 6/25/2021  1:22 PM Malik Reaves PA-C          ED Provider  Electronically Signed by           Armaan Elizondo PA-C  05/16/22 0704

## 2022-05-18 ENCOUNTER — HOSPITAL ENCOUNTER (OUTPATIENT)
Dept: MAMMOGRAPHY | Facility: IMAGING CENTER | Age: 52
Discharge: HOME | End: 2022-05-18
Payer: COMMERCIAL

## 2022-05-18 VITALS — HEIGHT: 62 IN | BODY MASS INDEX: 26.31 KG/M2 | WEIGHT: 143 LBS

## 2022-05-18 DIAGNOSIS — Z12.31 ENCOUNTER FOR SCREENING MAMMOGRAM FOR MALIGNANT NEOPLASM OF BREAST: ICD-10-CM

## 2022-05-18 PROCEDURE — 77067 SCR MAMMO BI INCL CAD: CPT

## 2022-05-18 PROCEDURE — 77063 BREAST TOMOSYNTHESIS BI: CPT

## 2022-05-23 DIAGNOSIS — F41.9 ANXIETY: ICD-10-CM

## 2022-05-23 RX ORDER — BUPROPION HYDROCHLORIDE 150 MG/1
TABLET ORAL
Qty: 90 TABLET | Refills: 1 | Status: SHIPPED | OUTPATIENT
Start: 2022-05-23 | End: 2022-07-31

## 2022-07-31 DIAGNOSIS — G47.00 INSOMNIA, UNSPECIFIED TYPE: ICD-10-CM

## 2022-07-31 DIAGNOSIS — F41.9 ANXIETY: ICD-10-CM

## 2022-07-31 RX ORDER — TRAZODONE HYDROCHLORIDE 50 MG/1
100 TABLET ORAL
Qty: 60 TABLET | Refills: 3 | Status: SHIPPED | OUTPATIENT
Start: 2022-07-31

## 2022-07-31 RX ORDER — BUPROPION HYDROCHLORIDE 150 MG/1
TABLET ORAL
Qty: 90 TABLET | Refills: 1 | Status: SHIPPED | OUTPATIENT
Start: 2022-07-31 | End: 2022-08-31

## 2022-08-31 DIAGNOSIS — F41.9 ANXIETY: ICD-10-CM

## 2022-08-31 RX ORDER — BUPROPION HYDROCHLORIDE 150 MG/1
TABLET ORAL
Qty: 90 TABLET | Refills: 1 | Status: SHIPPED | OUTPATIENT
Start: 2022-08-31

## 2022-09-01 DIAGNOSIS — L30.9 ECZEMA, UNSPECIFIED TYPE: ICD-10-CM

## 2022-09-01 RX ORDER — MOMETASONE FUROATE 1 MG/ML
SOLUTION TOPICAL DAILY
Qty: 60 ML | Refills: 0 | Status: SHIPPED | OUTPATIENT
Start: 2022-09-01

## 2022-09-02 ENCOUNTER — TELEPHONE (OUTPATIENT)
Dept: FAMILY MEDICINE CLINIC | Facility: CLINIC | Age: 52
End: 2022-09-02

## 2022-09-02 NOTE — TELEPHONE ENCOUNTER
Patient said her insurance company called and she needs an "override approval" for her Wellbutrin to be filled  They said they faxed something to us  Did you get anything? Is this the same as a Prior Auth?

## 2022-09-20 ENCOUNTER — TELEPHONE (OUTPATIENT)
Dept: FAMILY MEDICINE CLINIC | Facility: CLINIC | Age: 52
End: 2022-09-20

## 2022-09-20 NOTE — TELEPHONE ENCOUNTER
The prior authorization request for Rondine's wellbutrin was denied due to a need for further documentation  I put the letter on your desk in the brown folder for your review

## 2022-09-22 NOTE — TELEPHONE ENCOUNTER
Can you type a letter saying that the wellbutrin 450 mg is medically necessary  Patient has tried and failed other medications and has been stable on this medication for many years  You can ask the patient how long she has been on it, it was before Epic  Thank you

## 2022-11-06 DIAGNOSIS — F41.9 ANXIETY: ICD-10-CM

## 2022-11-07 RX ORDER — BUPROPION HYDROCHLORIDE 150 MG/1
TABLET ORAL
Qty: 90 TABLET | Refills: 1 | Status: SHIPPED | OUTPATIENT
Start: 2022-11-07

## 2022-11-29 ENCOUNTER — OFFICE VISIT (OUTPATIENT)
Dept: FAMILY MEDICINE CLINIC | Facility: CLINIC | Age: 52
End: 2022-11-29

## 2022-11-29 VITALS
WEIGHT: 147.1 LBS | DIASTOLIC BLOOD PRESSURE: 80 MMHG | HEIGHT: 62 IN | BODY MASS INDEX: 27.07 KG/M2 | SYSTOLIC BLOOD PRESSURE: 120 MMHG | HEART RATE: 73 BPM | RESPIRATION RATE: 16 BRPM | OXYGEN SATURATION: 98 % | TEMPERATURE: 98.5 F

## 2022-11-29 DIAGNOSIS — F41.9 ANXIETY DISORDER, UNSPECIFIED TYPE: ICD-10-CM

## 2022-11-29 DIAGNOSIS — Z11.3 SCREENING EXAMINATION FOR STD (SEXUALLY TRANSMITTED DISEASE): ICD-10-CM

## 2022-11-29 DIAGNOSIS — J01.00 ACUTE NON-RECURRENT MAXILLARY SINUSITIS: Primary | ICD-10-CM

## 2022-11-29 RX ORDER — CEFUROXIME AXETIL 500 MG/1
500 TABLET ORAL EVERY 12 HOURS SCHEDULED
Qty: 20 TABLET | Refills: 0 | Status: SHIPPED | OUTPATIENT
Start: 2022-11-29 | End: 2022-12-09

## 2022-11-29 RX ORDER — ALPRAZOLAM 0.25 MG/1
0.25 TABLET ORAL
Qty: 20 TABLET | Refills: 0 | Status: SHIPPED | OUTPATIENT
Start: 2022-11-29
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
## 2022-11-29 NOTE — LETTER
November 29, 2022     Patient: Vivien Room  YOB: 1970  Date of Visit: 11/29/2022      To Whom it May Concern:    Dennis Gloria is under my professional care  Nick was seen in my office on 11/29/2022  Nick may return to work on 12/1/2022  If you have any questions or concerns, please don't hesitate to call           Sincerely,          Cathy Peacock PA-C        CC: No Recipients

## 2022-12-02 DIAGNOSIS — J01.00 ACUTE NON-RECURRENT MAXILLARY SINUSITIS: Primary | ICD-10-CM

## 2022-12-02 RX ORDER — PREDNISONE 10 MG/1
TABLET ORAL
Qty: 20 TABLET | Refills: 0 | Status: SHIPPED | OUTPATIENT
Start: 2022-12-02 | End: 2023-01-16 | Stop reason: ALTCHOICE

## 2022-12-19 ENCOUNTER — TELEPHONE (OUTPATIENT)
Dept: FAMILY MEDICINE CLINIC | Facility: CLINIC | Age: 52
End: 2022-12-19

## 2022-12-19 DIAGNOSIS — Z11.3 SCREENING EXAMINATION FOR STD (SEXUALLY TRANSMITTED DISEASE): Primary | ICD-10-CM

## 2022-12-19 LAB — EXTERNAL HIV SCREEN: NORMAL

## 2022-12-19 NOTE — TELEPHONE ENCOUNTER
Patient went to have blood work done and they could not do the herpes simplex blood work  They said the one entered was a swab and asked if you can reenter it with blood work      Patient goes to [facility]
Spoke to patient and let her know script was reentered and patient will try to print if not she will pick it up in  bin
Stable.

## 2022-12-29 LAB
HSV1 IGG SER IA-ACNC: 31.1 INDEX (ref 0–0.9)
HSV2 IGG SER IA-ACNC: <0.91 INDEX (ref 0–0.9)

## 2023-01-03 DIAGNOSIS — F41.9 ANXIETY: ICD-10-CM

## 2023-01-03 RX ORDER — BUPROPION HYDROCHLORIDE 150 MG/1
TABLET ORAL
Qty: 90 TABLET | Refills: 1 | Status: SHIPPED | OUTPATIENT
Start: 2023-01-03

## 2023-01-07 DIAGNOSIS — G47.00 INSOMNIA, UNSPECIFIED TYPE: ICD-10-CM

## 2023-01-07 RX ORDER — TRAZODONE HYDROCHLORIDE 50 MG/1
TABLET ORAL
Qty: 60 TABLET | Refills: 3 | Status: SHIPPED | OUTPATIENT
Start: 2023-01-07

## 2023-01-12 DIAGNOSIS — L30.9 ECZEMA, UNSPECIFIED TYPE: ICD-10-CM

## 2023-01-12 RX ORDER — MOMETASONE FUROATE 1 MG/ML
SOLUTION TOPICAL
Qty: 60 ML | Refills: 0 | Status: SHIPPED | OUTPATIENT
Start: 2023-01-12

## 2023-01-13 NOTE — PROGRESS NOTES
Colon and Rectal Surgery   Raman Darnell 47 y o  female MRN [id number]  Encounter: [id number]  01/16/23 1:25 PM            Assessment: Raman Darnell is a 47 y o  female who has fecal incontinence  Plan:   Full incontinence of feces  She has full incontinence of feces that has occurred repeatedly  The symptoms are relatively recent  They are associated with chronically loose or poorly formed stools  This is clarified in her history despite the fact that her stool seems to be reasonably well formed on examination today  History does not reveal any other neurological symptoms of neuropathy or weakness in her lower extremities  I cannot explain her fecal incontinence, specifically, at this point  Options were reviewed  These include use of high-fiber food which she already accomplishes  Fiber supplementation with Metamucil is recommended  She understands this plan  Imodium may be used as necessary  We discussed pelvic floor physical therapy and I recommended she initiate this  She wants to try the above techniques before referral for the pelvic floor physical therapy  Biopsies of the colon will rule out microscopic colitis  Her colonoscopy is due and I recommend to be done as soon as possible  Colonoscopy risks, not limited to bleeding, perforated colon, need for surgery, and missed lesions were discussed  Alternatives were discussed  Questions were answered  She agreed to the procedure  She is to return should her symptoms persist more than 6 weeks  Subjective     HPI    Raman Darnell is a 47 y o  female who is here today for evaluation diarrhea and fecal incontinence since October  The patient reports 4 episodes of fecal leakage of liquid stool and notes she felt as though she had to pass gas prior to these episodes   The patient notes 3-4 loose bowel movements a day, which she notes to be her normal  The patient denies any rectal bleeding or mucus per the rectum as well as any abdominal discomfort and notes she has been doing a high fiber diet  The patient underwent robotic sigmoid resection on 6/22/2018  Post-Op Diagnosis Codes:     * Diverticulitis of large intestine without perforation or abscess without bleeding      Her most recent colonoscopy on 2/19/2020 showed: Impression: One sessile polyp measuring 5-10 mm in the transverse colon; removed by cold snare  Normal anastomosis at upper rectum  5 year colonoscopy recall  Pathology:   Final Diagnosis   A  Colon, transverse, polypectomy:             - Sessile serrated adenoma  - No dysplasia or malignancy is identified      B  Colon, random, biopsy:             - Benign colonic mucosa  - No evidence of acute or microscopic colitis is identified              - No dysplasia or malignancy is identified  Historical Information   Past Medical History:   Diagnosis Date   • Acute hemorrhagic cystitis     last assessed 07/22/2013   • Allergic    • Anxiety    • Cholelithiasis with acute cholecystitis    • Colitis    • Depression    • Diverticulitis    • Diverticulosis    • Eczema    • Frequent UTI    • Limb swelling     last assessed 03/20/2013   • Lung nodule     right   • Menorrhagia     last assessed 01/07/2013   • Pituitary tumor    • Pneumonia 2004    x1    • Stress incontinence    • Wears contact lenses    • Wears glasses      Past Surgical History:   Procedure Laterality Date   • BRONCHOSCOPY N/A 9/13/2016    Procedure: BRONCHOSCOPY FLEXIBLE ( FROZEN SECTION) ; Surgeon: Lisetet Allen MD;  [facility];  Service:    • CHOLECYSTECTOMY      Laparoscopic   • COLONOSCOPY     • ESOPHAGOGASTRODUODENOSCOPY     • ESSURE TUBAL LIGATION     • LIPOSUCTION     • KS BRONCHOSCOPY NEEDLE BX TRACHEA MAIN STEM&/BRON N/A 9/13/2016    Procedure: ENDOBRONCHIAL ULTRASOUND (EBUS);   Surgeon: Lisette Allen MD;  [facility];  Service: Thoracic   • KS CYSTOURETHROSCOPY N/A 11/21/2016 Procedure: CYSTOSCOPY;  Surgeon: Sharifa Larson MD;  [facility];  Service: UroGynecology          • SC EXC B9 LES MRGN XCP SK TG F/E/E/N/L/M 3 1-4 0CM Left 12/8/2016    Procedure: CHEEK SKIN LESION EXCISION/BIOPSY;  Surgeon: Charo Saucedo MD;  [facility];  Service: Plastics   • SC LAPAROSCOPY COLECTOMY PARTIAL W/ANASTOMOSIS N/A 6/22/2018    Procedure: ROBOTIC SIGMOID RESECTION;  Surgeon: Lindy Farmer MD;  [facility];  Service: Colorectal   • SC REPAIR COMPLEX F/C/C/M/N/AX/G/H/F 2 6-7 5 CM Left 12/8/2016    Procedure: COMPLEX CLOSURE;  Surgeon: Charo Saucedo MD;  [facility];  Service: Plastics   • SC SIGMOIDOSCOPY FLX DX W/COLLJ SPEC BR/WA IF PFRMD N/A 6/22/2018    Procedure: Lakeshia Feliciano;  Surgeon: Lindy Farmer MD;  [facility];  Service: Colorectal   • SC SLING OPERATION STRESS INCONTINENCE N/A 11/21/2016    Procedure: Robyn Sutherland;  Surgeon: Sharifa Larson MD;  [facility];  Service: UroGynecology          • SINUS SURGERY     • WISDOM TOOTH EXTRACTION         Meds/Allergies       Current Outpatient Medications:   •  ALPRAZolam (XANAX) 0 25 mg tablet, Take 1 tablet (0 25 mg total) by mouth daily at bedtime as needed for anxiety, Disp: 20 tablet, Rfl: 0  •  buPROPion (WELLBUTRIN XL) 150 mg 24 hr tablet, TAKE 3 TABLETS BY MOUTH DAILY, Disp: 90 tablet, Rfl: 1  •  CRANBERRY PO, Take by mouth  , Disp: , Rfl:   •  fluticasone (FLONASE) 50 mcg/act nasal spray, SPRAY 1 SPRAY INTO EACH NOSTRIL EVERY DAY, Disp: 16 mL, Rfl: 3  •  levothyroxine 88 mcg tablet, Take 1 tablet (88 mcg total) by mouth daily, Disp: 30 tablet, Rfl: 11  •  mometasone (ELOCON) 0 1 % cream, Apply 1 application topically daily, Disp: 45 g, Rfl: 3  •  mometasone (ELOCON) 0 1 % lotion, APPLY TO AFFECTED AREA TOPICALLY EVERY DAY, Disp: 60 mL, Rfl: 0  •  traZODone (DESYREL) 50 mg tablet, TAKE 2 TABLETS BY MOUTH AT BEDTIME, Disp: 60 tablet, Rfl: 3  Allergies Allergen Reactions   • Bactrim [Sulfamethoxazole-Trimethoprim] Hives   • Serotonin Reuptake Inhibitors (Ssris) Hives     Other reaction(s): DUE TO PITUITARY ADENOMA    Effexor had bad effects when being weaned off- has a pituitary  tumor   • Trimethoprim Rash       Social History   Social History     Substance and Sexual Activity   Drug Use No     Social History     Tobacco Use   Smoking Status Never   Smokeless Tobacco Never         Family History   Problem Relation Age of Onset   • Depression Mother    • Prostate cancer Father    • No Known Problems Daughter    • No Known Problems Maternal Grandmother    • No Known Problems Maternal Grandfather    • No Known Problems Paternal Grandmother    • No Known Problems Paternal Grandfather    • No Known Problems Son    • Alcohol abuse Family    • Depression Family    • Osteoporosis Family    • No Known Problems Maternal Aunt    • No Known Problems Paternal Aunt    • Substance Abuse Neg Hx    • Mental illness Neg Hx          Review of Systems   Constitutional: Negative  Respiratory: Negative  Cardiovascular: Negative  Gastrointestinal: Positive for abdominal distention and diarrhea  Negative for abdominal pain, anal bleeding, blood in stool, constipation and nausea  Fecal incontinence and chronically loose stools  Objective   Current Vitals:  Vitals:    01/16/23 1253   Weight: 65 8 kg (145 lb)   Height: 5' 2" (1 575 m)         Physical Exam  Constitutional:       Appearance: Normal appearance  Eyes:      Conjunctiva/sclera: Conjunctivae normal    Cardiovascular:      Rate and Rhythm: Normal rate and regular rhythm  Pulmonary:      Effort: Pulmonary effort is normal       Breath sounds: Normal breath sounds  Abdominal:      General: Abdomen is flat  Palpations: Abdomen is soft  There is no mass  Tenderness: There is abdominal tenderness  There is no guarding  Hernia: No hernia is present  Genitourinary:     Comments:  There is formed stool in the rectal vault  The anus is snug and there is good voluntary squeeze augmentation  Hemorrhoids are mild  Neurological:      General: No focal deficit present  Mental Status: She is alert and oriented to person, place, and time     Psychiatric:         Mood and Affect: Mood normal          Behavior: Behavior normal

## 2023-01-16 ENCOUNTER — OFFICE VISIT (OUTPATIENT)
Age: 53
End: 2023-01-16

## 2023-01-16 ENCOUNTER — ANNUAL EXAM (OUTPATIENT)
Dept: FAMILY MEDICINE CLINIC | Facility: CLINIC | Age: 53
End: 2023-01-16

## 2023-01-16 VITALS — HEIGHT: 62 IN | WEIGHT: 145 LBS | BODY MASS INDEX: 26.68 KG/M2

## 2023-01-16 VITALS
BODY MASS INDEX: 25.46 KG/M2 | WEIGHT: 143.7 LBS | HEIGHT: 63 IN | DIASTOLIC BLOOD PRESSURE: 76 MMHG | RESPIRATION RATE: 16 BRPM | HEART RATE: 78 BPM | SYSTOLIC BLOOD PRESSURE: 118 MMHG

## 2023-01-16 DIAGNOSIS — Z01.419 ENCOUNTER FOR GYNECOLOGICAL EXAMINATION WITH PAPANICOLAOU SMEAR OF CERVIX: Primary | ICD-10-CM

## 2023-01-16 DIAGNOSIS — R15.9 FULL INCONTINENCE OF FECES: Primary | ICD-10-CM

## 2023-01-16 NOTE — PROGRESS NOTES
ASSESSMENT & PLAN: Shelby Christianson is a 47 y o   with normal gynecologic exam     1   Routine well woman exam done today  2    Pap and HPV:Pap with HPV was done today  Current ASCCP Guidelines reviewed  3   The patient is not sexually active  4  The following were reviewed in today's visit: breast self exam, mammography screening ordered, STD testing, adequate intake of calcium and vitamin D, exercise, healthy diet and colonoscopy discussed and ordered  6  Patient to return to office in 12 months for physical      All questions have been answered to her satisfaction  CC:  Annual Gynecologic Examination    HPI: Shelby Christianson is a 47 y o   who presents for annual gynecologic examination  She has the following concerns:  none    Health Maintenance:    She exercises zero days per week  She wears her seatbelt routinely  She does perform regular monthly self breast exams  She feels safe at home  Patients does follow a balanced diet  Past Medical History:   Diagnosis Date   • Acute hemorrhagic cystitis     last assessed 2013   • Allergic    • Anxiety    • Cholelithiasis with acute cholecystitis    • Colitis    • Depression    • Diverticulitis    • Diverticulosis    • Eczema    • Frequent UTI    • Limb swelling     last assessed 2013   • Lung nodule     right   • Menorrhagia     last assessed 2013   • Pituitary tumor    • Pneumonia 2004    x1    • Stress incontinence    • Wears contact lenses    • Wears glasses        Past Surgical History:   Procedure Laterality Date   • BRONCHOSCOPY N/A 2016    Procedure: BRONCHOSCOPY FLEXIBLE ( FROZEN SECTION) ;   Surgeon: Sunshine Fitch MD;  [facility];  Service:    • CHOLECYSTECTOMY      Laparoscopic   • COLONOSCOPY     • ESOPHAGOGASTRODUODENOSCOPY     • ESSURE TUBAL LIGATION     • LIPOSUCTION     • GA BRONCHOSCOPY NEEDLE BX TRACHEA MAIN STEM&/BRON N/A 2016    Procedure: ENDOBRONCHIAL ULTRASOUND (EBUS); Surgeon: Shena Cook MD;  [facility];  Service: Thoracic   • OR CYSTOURETHROSCOPY N/A 11/21/2016    Procedure: CYSTOSCOPY;  Surgeon: Vinh Friedman MD;  [facility];  Service: UroGynecology          • OR EXC B9 LES MRGN XCP SK TG F/E/E/N/L/M 3 1-4 0CM Left 12/8/2016    Procedure: CHEEK SKIN LESION EXCISION/BIOPSY;  Surgeon: Lg Melgar MD;  [facility];  Service: Plastics   • OR LAPAROSCOPY COLECTOMY PARTIAL W/ANASTOMOSIS N/A 6/22/2018    Procedure: ROBOTIC SIGMOID RESECTION;  Surgeon: Heddy Ormond, MD;  [facility];  Service: Colorectal   • OR REPAIR COMPLEX F/C/C/M/N/AX/G/H/F 2 6-7 5 CM Left 12/8/2016    Procedure: COMPLEX CLOSURE;  Surgeon: Lg Melgar MD;  [facility];  Service: Plastics   • OR SIGMOIDOSCOPY FLX DX W/COLLJ SPEC BR/WA IF PFRMD N/A 6/22/2018    Procedure: Aspen Martins;  Surgeon: Heddy Ormond, MD;  [facility];  Service: Colorectal   • OR SLING OPERATION STRESS INCONTINENCE N/A 11/21/2016    Procedure: Franck Baker;  Surgeon: Vinh Friedman MD;  [facility];  Service: UroGynecology          • SINUS SURGERY     • WISDOM TOOTH EXTRACTION         Past OB/Gyn History:   No LMP recorded (lmp unknown)   Patient is postmenopausal     Last Pap  1/27/2020 :  no abnormalities;  HPV negative    Family History  Family History   Problem Relation Age of Onset   • Depression Mother    • Prostate cancer Father    • No Known Problems Daughter    • No Known Problems Maternal Grandmother    • No Known Problems Maternal Grandfather    • No Known Problems Paternal Grandmother    • No Known Problems Paternal Grandfather    • No Known Problems Son    • Alcohol abuse Family    • Depression Family    • Osteoporosis Family    • No Known Problems Maternal Aunt    • No Known Problems Paternal Aunt    • Substance Abuse Neg Hx    • Mental illness Neg Hx        Social History:  Social History     Socioeconomic History   • Marital status:      Spouse name: Not on file   • Number of children: Not on file   • Years of education: Not on file   • Highest education level: Not on file   Occupational History   • 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[place] [address]   Tobacco Use   • Smoking status: Never   • Smokeless tobacco: Never   Vaping Use   • Vaping Use: Never used   Substance and Sexual Activity   • Alcohol use: Yes     Alcohol/week: 6 0 standard drinks     Types: 2 Glasses of wine, 2 Cans of beer, 2 Shots of liquor per week     Comment: weekends only    • Drug use: No   • Sexual activity: Yes   Other Topics Concern   • Not on file   Social History Narrative    No caffeine use    Has smoke detectors    Uses safety equipment-seat belts     Social Determinants of Health     Financial Resource Strain: Not on file   Food Insecurity: Not on file   Transportation Needs: Not on file   Physical Activity: Not on file   Stress: Not on file   Social Connections: Not on file   Intimate Partner Violence: Not on file   Housing Stability: Not on file     Presently lives alone  Patient is single  Patient is currently employed  Allergies:   Allergies   Allergen Reactions   • Bactrim [Sulfamethoxazole-Trimethoprim] Hives   • Serotonin Reuptake Inhibitors (Ssris) Hives     Other reaction(s): DUE TO PITUITARY ADENOMA    Effexor had bad effects when being weaned off- has a pituitary  tumor   • Trimethoprim Rash       Medications:    Current Outpatient Medications:   •  ALPRAZolam (XANAX) 0 25 mg tablet, Take 1 tablet (0 25 mg total) by mouth daily at bedtime as needed for anxiety, Disp: 20 tablet, Rfl: 0  •  buPROPion (WELLBUTRIN XL) 150 mg 24 hr tablet, TAKE 3 TABLETS BY MOUTH DAILY, Disp: 90 tablet, Rfl: 1  •  CRANBERRY PO, Take by mouth  , Disp: , Rfl:   •  fluticasone (FLONASE) 50 mcg/act nasal spray, SPRAY 1 SPRAY INTO EACH NOSTRIL EVERY DAY, Disp: 16 mL, Rfl: 3  •  levothyroxine 88 mcg tablet, Take 1 tablet (88 mcg total) by mouth daily, Disp: 30 tablet, Rfl: 11  •  mometasone (ELOCON) 0 1 % cream, Apply 1 application topically daily, Disp: 45 g, Rfl: 3  •  mometasone (ELOCON) 0 1 % lotion, APPLY TO AFFECTED AREA TOPICALLY EVERY DAY, Disp: 60 mL, Rfl: 0  •  traZODone (DESYREL) 50 mg tablet, TAKE 2 TABLETS BY MOUTH AT BEDTIME, Disp: 60 tablet, Rfl: 3    Review of Systems:  Review of Systems   Constitutional: Negative  HENT: Negative  Eyes: Negative  Respiratory: Negative  Cardiovascular: Negative  Gastrointestinal: Negative  Endocrine: Negative  Genitourinary: Negative  Musculoskeletal: Negative  Skin: Negative  Allergic/Immunologic: Negative  Neurological: Negative  Hematological: Negative  Psychiatric/Behavioral: Negative  Physical Exam:  /76   Pulse 78   Resp 16   Ht 5' 2 5" (1 588 m)   Wt 65 2 kg (143 lb 11 2 oz)   LMP  (LMP Unknown)   BMI 25 86 kg/m²    Physical Exam  Constitutional:       Appearance: Normal appearance  She is well-developed and normal weight  Genitourinary:      Vulva normal       No lesions in the vagina  No inguinal adenopathy present in the right or left side  No vaginal discharge, erythema or bleeding  Right Adnexa: not tender, not full and no mass present  Left Adnexa: not tender, not full and no mass present  No cervical motion tenderness, discharge, lesion or polyp  Uterus is not enlarged or tender  No uterine mass detected  Breasts:     Right: No inverted nipple, mass, nipple discharge or skin change  Left: No inverted nipple, mass, nipple discharge or skin change  HENT:      Head: Normocephalic and atraumatic  Eyes:      Conjunctiva/sclera: Conjunctivae normal       Pupils: Pupils are equal, round, and reactive to light  Neck:      Thyroid: No thyromegaly  Cardiovascular:      Rate and Rhythm: Normal rate and regular rhythm  Pulses: Normal pulses  Heart sounds: Normal heart sounds   No murmur heard   Pulmonary:      Effort: Pulmonary effort is normal  No respiratory distress  Breath sounds: Normal breath sounds  No wheezing or rales  Abdominal:      General: Abdomen is flat  Bowel sounds are normal  There is no distension  Palpations: Abdomen is soft  There is no mass  Hernia: No hernia is present  There is no hernia in the left inguinal area or right inguinal area  Musculoskeletal:         General: Normal range of motion  Cervical back: Normal range of motion and neck supple  Lymphadenopathy:      Cervical: No cervical adenopathy  Upper Body:      Right upper body: No supraclavicular adenopathy  Left upper body: No supraclavicular adenopathy  Lower Body: No right inguinal adenopathy  No left inguinal adenopathy  Neurological:      General: No focal deficit present  Mental Status: She is alert and oriented to person, place, and time  Cranial Nerves: No cranial nerve deficit  Deep Tendon Reflexes: Reflexes are normal and symmetric  Skin:     General: Skin is warm and dry  Psychiatric:         Mood and Affect: Mood normal          Behavior: Behavior normal          Thought Content:  Thought content normal          Judgment: Judgment normal

## 2023-01-16 NOTE — ASSESSMENT & PLAN NOTE
She has full incontinence of feces that has occurred repeatedly  The symptoms are relatively recent  They are associated with chronically loose or poorly formed stools  This is clarified in her history despite the fact that her stool seems to be reasonably well formed on examination today  History does not reveal any other neurological symptoms of neuropathy or weakness in her lower extremities  I cannot explain her fecal incontinence, specifically, at this point  Options were reviewed  These include use of high-fiber food which she already accomplishes  Fiber supplementation with Metamucil is recommended  She understands this plan  Imodium may be used as necessary  We discussed pelvic floor physical therapy and I recommended she initiate this  She wants to try the above techniques before referral for the pelvic floor physical therapy  Biopsies of the colon will rule out microscopic colitis  Her colonoscopy is due and I recommend to be done as soon as possible  Colonoscopy risks, not limited to bleeding, perforated colon, need for surgery, and missed lesions were discussed  Alternatives were discussed  Questions were answered  She agreed to the procedure  She is to return should her symptoms persist more than 6 weeks

## 2023-01-18 LAB
CYTOLOGIST CVX/VAG CYTO: NORMAL
DX ICD CODE: NORMAL
OTHER STN SPEC: NORMAL
PATH REPORT.FINAL DX SPEC: NORMAL
SL AMB NOTE:: NORMAL
SL AMB SPECIMEN ADEQUACY: NORMAL
SL AMB TEST METHODOLOGY: NORMAL

## 2023-01-20 ENCOUNTER — TELEPHONE (OUTPATIENT)
Age: 53
End: 2023-01-20

## 2023-01-20 NOTE — TELEPHONE ENCOUNTER
TR OV 1/16/23 fecal incontinence, s/p sigmoid resection 6/22/18 last fc 2/19/20 sessile serrated x1, BMI 26     Attempted to contact patient, VM full, unable to leave message  Will attempt to contact at another time

## 2023-02-27 ENCOUNTER — OFFICE VISIT (OUTPATIENT)
Dept: FAMILY MEDICINE CLINIC | Facility: CLINIC | Age: 53
End: 2023-02-27

## 2023-02-27 VITALS
WEIGHT: 144 LBS | RESPIRATION RATE: 16 BRPM | HEART RATE: 67 BPM | DIASTOLIC BLOOD PRESSURE: 70 MMHG | HEIGHT: 63 IN | OXYGEN SATURATION: 98 % | BODY MASS INDEX: 25.52 KG/M2 | TEMPERATURE: 98.2 F | SYSTOLIC BLOOD PRESSURE: 120 MMHG

## 2023-02-27 DIAGNOSIS — Z12.31 ENCOUNTER FOR SCREENING MAMMOGRAM FOR MALIGNANT NEOPLASM OF BREAST: ICD-10-CM

## 2023-02-27 DIAGNOSIS — J01.10 ACUTE NON-RECURRENT FRONTAL SINUSITIS: Primary | ICD-10-CM

## 2023-02-27 DIAGNOSIS — R09.81 SINUS CONGESTION: Primary | ICD-10-CM

## 2023-02-27 RX ORDER — AMOXICILLIN AND CLAVULANATE POTASSIUM 875; 125 MG/1; MG/1
1 TABLET, FILM COATED ORAL EVERY 12 HOURS SCHEDULED
Qty: 28 TABLET | Refills: 0 | Status: SHIPPED | OUTPATIENT
Start: 2023-02-27 | End: 2023-02-27

## 2023-02-27 RX ORDER — AMOXICILLIN AND CLAVULANATE POTASSIUM 875; 125 MG/1; MG/1
1 TABLET, FILM COATED ORAL EVERY 12 HOURS SCHEDULED
Qty: 20 TABLET | Refills: 0 | Status: SHIPPED | OUTPATIENT
Start: 2023-02-27 | End: 2023-03-09

## 2023-02-27 NOTE — PROGRESS NOTES
Assessment/Plan:     Diagnoses and all orders for this visit:    Sinus congestion    Pt may continue utilizing Mucinex, Flonase and sinus rinses  She is to rest and keep well hydrated  She is to also utilize a home humidifier  Pt notified that we typically utilize watchful waiting for 10 days prior to start of any antibiotics  Patient is encouraged to call our office for any questions/concerns, persistent or worsening symptoms  Patient states they understand and agree with treatment plan  Encounter for screening mammogram for malignant neoplasm of breast  -     Mammo screening bilateral w 3d & cad; Future          Pt to f/u PRN  Subjective:      Patient ID: Lani Reyes is a 47 y o  female  Pt presents for frontal sinus pressure/congestion that started on Thursday  She has been able to get sporadic mucus drainage from her nose that is thick  She denies fever, chills, body aches, NVD, cough  Pt has been taking Mucinex, Flonase and utilizing sinus rinses without much relief  She had negative covid test at home  The following portions of the patient's history were reviewed and updated as appropriate: allergies, current medications, past family history, past medical history, past social history, past surgical history and problem list     Review of Systems    As noted per HPI  Objective:      /70 (BP Location: Left arm, Patient Position: Sitting, Cuff Size: Standard)   Pulse 67   Temp 98 2 °F (36 8 °C) (Oral)   Resp 16   Ht 5' 2 5" (1 588 m)   Wt 65 3 kg (144 lb)   LMP  (LMP Unknown)   SpO2 98%   BMI 25 92 kg/m²          Physical Exam  Vitals reviewed  Constitutional:       General: She is not in acute distress  Appearance: Normal appearance  She is not ill-appearing  HENT:      Head: Normocephalic  Right Ear: Tympanic membrane, ear canal and external ear normal       Left Ear: Tympanic membrane, ear canal and external ear normal       Nose: Congestion present   No rhinorrhea  Mouth/Throat:      Pharynx: No oropharyngeal exudate or posterior oropharyngeal erythema  Cardiovascular:      Rate and Rhythm: Normal rate and regular rhythm  Pulses: Normal pulses  Heart sounds: Normal heart sounds  No murmur heard  Pulmonary:      Effort: Pulmonary effort is normal  No respiratory distress  Breath sounds: Normal breath sounds  No wheezing  Neurological:      Mental Status: She is alert and oriented to person, place, and time  Mental status is at baseline  Psychiatric:         Mood and Affect: Mood normal          Behavior: Behavior normal          Thought Content:  Thought content normal          Judgment: Judgment normal

## 2023-03-07 DIAGNOSIS — F41.9 ANXIETY: ICD-10-CM

## 2023-03-07 RX ORDER — BUPROPION HYDROCHLORIDE 150 MG/1
TABLET ORAL
Qty: 90 TABLET | Refills: 1 | Status: SHIPPED | OUTPATIENT
Start: 2023-03-07

## 2023-03-27 ENCOUNTER — TELEPHONE (OUTPATIENT)
Dept: FAMILY MEDICINE CLINIC | Facility: CLINIC | Age: 53
End: 2023-03-27

## 2023-03-27 NOTE — TELEPHONE ENCOUNTER
----- Message from Brittany Curry sent at 3/27/2023  3:16 PM EDT -----  Regarding: FW: Sinus infection  Contact: [phone]  Needs appt  ----- Message -----  From: Everardo Bruner PA-C  Sent: 3/27/2023  11:50 AM EDT  To: AUSTEN Curry  Subject: FW: Sinus infection                                ----- Message -----  From: Samuel Mccauley MA  Sent: 3/27/2023   7:30 AM EDT  To: Everardo Bruner PA-C  Subject: FW: Sinus infection                                ----- Message -----  From: Jed Lockett  Sent: 3/25/2023   8:38 AM EDT  To: , #  Subject: Sinus infection                                  Hi Nini Zhou,    Is there any way that you could call in a refill for the Augmentin you put me on last month? My sinuses are killing me again  It’s my own fault, as I wasn’t faithful to taking the meds while I was in [place]  I don’t have COVID & I have zero sick time at work to be able to come in & see you  I am doing sinus flushes, Flonase & mucinex  I’ve been sneezing like crazy, have the drip & have been coughing  No fever  It’s been a few days now & today I don’t even want to get out of bed       Thank you,  Nick

## 2023-03-28 ENCOUNTER — APPOINTMENT (OUTPATIENT)
Dept: RADIOLOGY | Facility: CLINIC | Age: 53
End: 2023-03-28

## 2023-03-28 ENCOUNTER — OFFICE VISIT (OUTPATIENT)
Dept: FAMILY MEDICINE CLINIC | Facility: CLINIC | Age: 53
End: 2023-03-28

## 2023-03-28 VITALS
HEART RATE: 73 BPM | BODY MASS INDEX: 27.74 KG/M2 | OXYGEN SATURATION: 99 % | DIASTOLIC BLOOD PRESSURE: 70 MMHG | HEIGHT: 60 IN | WEIGHT: 141.3 LBS | SYSTOLIC BLOOD PRESSURE: 112 MMHG

## 2023-03-28 DIAGNOSIS — R09.81 SINUS CONGESTION: Primary | ICD-10-CM

## 2023-03-28 DIAGNOSIS — R09.81 SINUS CONGESTION: ICD-10-CM

## 2023-03-28 NOTE — PROGRESS NOTES
"Assessment/Plan:     Diagnoses and all orders for this visit:    Sinus congestion  -     XR sinuses routine 3+ views; Future      Sinus xray for further evaluation as patient's exam is normal  Pt may continue her Mucinex, nasal sprays and sinus rinses  She is encouraged to keep well hydrated  Patient is encouraged to call our office for any questions/concerns, persistent or worsening symptoms  Patient states they understand and agree with treatment plan  F/u PRN  F/u pending results  Subjective:      Patient ID: Jenni Leon is a 47 y o  female  Pt presents today for sinus pressure/pain  She denies fever, chills, body aches  She has been taking Mucinex, nasal rinses and Flonase  Pt also notes PND and a dry cough  Pt was treated for sinus infection 1 month ago  She admits she took her Augmentin, but did skip some doses  Pt believes she may still have a sinus infection lingering  The following portions of the patient's history were reviewed and updated as appropriate: allergies, current medications, past family history, past medical history, past social history, past surgical history and problem list     Review of Systems    As noted per HPI  Objective:      /70   Pulse 73   Ht 5' 0 25\" (1 53 m)   Wt 64 1 kg (141 lb 4 8 oz)   LMP  (LMP Unknown)   SpO2 99%   BMI 27 37 kg/m²          Physical Exam  Vitals reviewed  Constitutional:       Appearance: Normal appearance  HENT:      Right Ear: Tympanic membrane, ear canal and external ear normal       Left Ear: Tympanic membrane, ear canal and external ear normal       Nose: Congestion (slight) present  No rhinorrhea  Mouth/Throat:      Pharynx: No oropharyngeal exudate or posterior oropharyngeal erythema  Cardiovascular:      Pulses: Normal pulses  Heart sounds: Normal heart sounds  Pulmonary:      Effort: Pulmonary effort is normal       Breath sounds: Normal breath sounds     Neurological:      Mental Status: " She is alert and oriented to person, place, and time  Mental status is at baseline  Psychiatric:         Mood and Affect: Mood normal          Behavior: Behavior normal          Thought Content:  Thought content normal          Judgment: Judgment normal

## 2023-03-29 ENCOUNTER — TELEPHONE (OUTPATIENT)
Dept: FAMILY MEDICINE CLINIC | Facility: CLINIC | Age: 53
End: 2023-03-29

## 2023-03-29 NOTE — TELEPHONE ENCOUNTER
I called and left a message with the results and recommendations of the medication  I told pt that if she wants to see ENT to call our office to have a referral placed

## 2023-03-29 NOTE — TELEPHONE ENCOUNTER
----- Message from Yoshi Bond, [address]  sent at 3/29/2023  8:12 AM EDT -----  Please let patient know that her sinus xray is normal, no sinus infection  She may continue the medications she is still taking for her congestion  If her sinuses bother her further I would recommend following up with ENT  I can always place a referral if she needs one  Thanks!

## 2023-04-06 LAB
ALBUMIN SERPL-MCNC: 4.9 G/DL (ref 3.8–4.9)
ALBUMIN/GLOB SERPL: 2 {RATIO} (ref 1.2–2.2)
ALP SERPL-CCNC: 73 IU/L (ref 44–121)
ALT SERPL-CCNC: 31 IU/L (ref 0–32)
AST SERPL-CCNC: 24 IU/L (ref 0–40)
BASOPHILS # BLD AUTO: 0.1 X10E3/UL (ref 0–0.2)
BASOPHILS NFR BLD AUTO: 1 %
BILIRUB SERPL-MCNC: 0.2 MG/DL (ref 0–1.2)
BUN SERPL-MCNC: 16 MG/DL (ref 6–24)
BUN/CREAT SERPL: 15 (ref 9–23)
CALCIUM SERPL-MCNC: 10 MG/DL (ref 8.7–10.2)
CHLORIDE SERPL-SCNC: 100 MMOL/L (ref 96–106)
CO2 SERPL-SCNC: 28 MMOL/L (ref 20–29)
CREAT SERPL-MCNC: 1.04 MG/DL (ref 0.57–1)
EGFR: 64 ML/MIN/1.73
EOSINOPHIL # BLD AUTO: 0.1 X10E3/UL (ref 0–0.4)
EOSINOPHIL NFR BLD AUTO: 1 %
ERYTHROCYTE [DISTWIDTH] IN BLOOD BY AUTOMATED COUNT: 12.7 % (ref 11.7–15.4)
GLOBULIN SER-MCNC: 2.5 G/DL (ref 1.5–4.5)
GLUCOSE SERPL-MCNC: 58 MG/DL (ref 70–99)
HCT VFR BLD AUTO: 41.3 % (ref 34–46.6)
HGB BLD-MCNC: 13.7 G/DL (ref 11.1–15.9)
IGF-I SERPL-MCNC: 179 NG/ML (ref 65–216)
IMM GRANULOCYTES # BLD: 0 X10E3/UL (ref 0–0.1)
IMM GRANULOCYTES NFR BLD: 0 %
LYMPHOCYTES # BLD AUTO: 1.9 X10E3/UL (ref 0.7–3.1)
LYMPHOCYTES NFR BLD AUTO: 23 %
MCH RBC QN AUTO: 28.8 PG (ref 26.6–33)
MCHC RBC AUTO-ENTMCNC: 33.2 G/DL (ref 31.5–35.7)
MCV RBC AUTO: 87 FL (ref 79–97)
MONOCYTES # BLD AUTO: 0.5 X10E3/UL (ref 0.1–0.9)
MONOCYTES NFR BLD AUTO: 6 %
NEUTROPHILS # BLD AUTO: 5.8 X10E3/UL (ref 1.4–7)
NEUTROPHILS NFR BLD AUTO: 69 %
PLATELET # BLD AUTO: 462 X10E3/UL (ref 150–450)
POTASSIUM SERPL-SCNC: 4.1 MMOL/L (ref 3.5–5.2)
PROLACTIN SERPL-MCNC: 3.3 NG/ML (ref 4.8–23.3)
PROT SERPL-MCNC: 7.4 G/DL (ref 6–8.5)
RBC # BLD AUTO: 4.75 X10E6/UL (ref 3.77–5.28)
SODIUM SERPL-SCNC: 141 MMOL/L (ref 134–144)
T4 FREE SERPL-MCNC: 1.56 NG/DL (ref 0.82–1.77)
TSH SERPL DL<=0.005 MIU/L-ACNC: 0.71 UIU/ML (ref 0.45–4.5)
WBC # BLD AUTO: 8.3 X10E3/UL (ref 3.4–10.8)

## 2023-05-10 DIAGNOSIS — G47.00 INSOMNIA, UNSPECIFIED TYPE: ICD-10-CM

## 2023-05-10 DIAGNOSIS — F41.9 ANXIETY: ICD-10-CM

## 2023-05-10 RX ORDER — BUPROPION HYDROCHLORIDE 150 MG/1
TABLET ORAL
Qty: 90 TABLET | Refills: 1 | Status: SHIPPED | OUTPATIENT
Start: 2023-05-10

## 2023-05-10 RX ORDER — TRAZODONE HYDROCHLORIDE 50 MG/1
TABLET ORAL
Qty: 60 TABLET | Refills: 3 | Status: SHIPPED | OUTPATIENT
Start: 2023-05-10

## 2023-05-19 ENCOUNTER — DOCUMENTATION (OUTPATIENT)
Dept: ENDOCRINOLOGY | Facility: HOSPITAL | Age: 53
End: 2023-05-19

## 2023-06-02 ENCOUNTER — HOSPITAL ENCOUNTER (OUTPATIENT)
Dept: MRI IMAGING | Facility: HOSPITAL | Age: 53
Discharge: HOME | End: 2023-06-02

## 2023-06-02 DIAGNOSIS — D49.7 PITUITARY TUMOR: ICD-10-CM

## 2023-06-02 DIAGNOSIS — Z86.39 HISTORY OF HYPERPROLACTINEMIA: ICD-10-CM

## 2023-06-02 RX ADMIN — GADOBUTROL 6 ML: 604.72 INJECTION INTRAVENOUS at 19:55

## 2023-06-26 DIAGNOSIS — L30.9 ECZEMA, UNSPECIFIED TYPE: ICD-10-CM

## 2023-06-27 RX ORDER — MOMETASONE FUROATE 1 MG/ML
SOLUTION TOPICAL DAILY
Qty: 60 ML | Refills: 0 | Status: SHIPPED | OUTPATIENT
Start: 2023-06-27

## 2023-06-28 ENCOUNTER — HOSPITAL ENCOUNTER (OUTPATIENT)
Dept: MAMMOGRAPHY | Facility: IMAGING CENTER | Age: 53
Discharge: HOME | End: 2023-06-28
Payer: COMMERCIAL

## 2023-06-28 VITALS — BODY MASS INDEX: 24.48 KG/M2 | HEIGHT: 62 IN | WEIGHT: 133 LBS

## 2023-06-28 DIAGNOSIS — Z12.31 ENCOUNTER FOR SCREENING MAMMOGRAM FOR MALIGNANT NEOPLASM OF BREAST: ICD-10-CM

## 2023-06-28 PROCEDURE — 77067 SCR MAMMO BI INCL CAD: CPT

## 2023-06-28 PROCEDURE — 77063 BREAST TOMOSYNTHESIS BI: CPT

## 2023-07-12 DIAGNOSIS — F41.9 ANXIETY: ICD-10-CM

## 2023-07-12 RX ORDER — BUPROPION HYDROCHLORIDE 150 MG/1
TABLET ORAL
Qty: 90 TABLET | Refills: 1 | Status: SHIPPED | OUTPATIENT
Start: 2023-07-12

## 2023-09-16 DIAGNOSIS — G47.00 INSOMNIA, UNSPECIFIED TYPE: ICD-10-CM

## 2023-09-16 DIAGNOSIS — F41.9 ANXIETY: ICD-10-CM

## 2023-09-18 RX ORDER — TRAZODONE HYDROCHLORIDE 50 MG/1
TABLET ORAL
Qty: 60 TABLET | Refills: 3 | Status: SHIPPED | OUTPATIENT
Start: 2023-09-18

## 2023-09-18 RX ORDER — BUPROPION HYDROCHLORIDE 150 MG/1
TABLET ORAL
Qty: 90 TABLET | Refills: 1 | Status: SHIPPED | OUTPATIENT
Start: 2023-09-18

## 2023-10-17 DIAGNOSIS — F41.9 ANXIETY: ICD-10-CM

## 2023-10-17 RX ORDER — BUPROPION HYDROCHLORIDE 150 MG/1
TABLET ORAL
Qty: 90 TABLET | Refills: 1 | Status: SHIPPED | OUTPATIENT
Start: 2023-10-17

## 2023-10-23 ENCOUNTER — TELEPHONE (OUTPATIENT)
Dept: FAMILY MEDICINE CLINIC | Facility: CLINIC | Age: 53
End: 2023-10-23

## 2023-10-27 DIAGNOSIS — L30.9 ECZEMA, UNSPECIFIED TYPE: ICD-10-CM

## 2023-10-27 RX ORDER — MOMETASONE FUROATE 1 MG/ML
SOLUTION TOPICAL DAILY
Qty: 60 ML | Refills: 0 | Status: SHIPPED | OUTPATIENT
Start: 2023-10-27

## 2023-10-27 RX ORDER — MOMETASONE FUROATE 1 MG/G
1 CREAM TOPICAL DAILY
Qty: 45 G | Refills: 0 | Status: SHIPPED | OUTPATIENT
Start: 2023-10-27

## 2023-11-03 ENCOUNTER — OFFICE VISIT (OUTPATIENT)
Dept: FAMILY MEDICINE CLINIC | Facility: CLINIC | Age: 53
End: 2023-11-03
Payer: COMMERCIAL

## 2023-11-03 VITALS
DIASTOLIC BLOOD PRESSURE: 70 MMHG | TEMPERATURE: 98 F | SYSTOLIC BLOOD PRESSURE: 110 MMHG | RESPIRATION RATE: 16 BRPM | OXYGEN SATURATION: 98 % | WEIGHT: 136.4 LBS | HEIGHT: 62 IN | BODY MASS INDEX: 25.1 KG/M2 | HEART RATE: 77 BPM

## 2023-11-03 DIAGNOSIS — R20.2 NUMBNESS AND TINGLING IN BOTH HANDS: ICD-10-CM

## 2023-11-03 DIAGNOSIS — R29.898 HAND WEAKNESS: Primary | ICD-10-CM

## 2023-11-03 DIAGNOSIS — M25.532 PAIN IN BOTH WRISTS: ICD-10-CM

## 2023-11-03 DIAGNOSIS — F32.5 MAJOR DEPRESSIVE DISORDER WITH SINGLE EPISODE, IN FULL REMISSION (HCC): Chronic | ICD-10-CM

## 2023-11-03 DIAGNOSIS — F41.1 GENERALIZED ANXIETY DISORDER: ICD-10-CM

## 2023-11-03 DIAGNOSIS — M25.531 PAIN IN BOTH WRISTS: ICD-10-CM

## 2023-11-03 DIAGNOSIS — E03.9 ACQUIRED HYPOTHYROIDISM: Primary | Chronic | ICD-10-CM

## 2023-11-03 DIAGNOSIS — Z82.62 FAMILY HISTORY OF OSTEOPOROSIS: ICD-10-CM

## 2023-11-03 DIAGNOSIS — R20.0 NUMBNESS AND TINGLING IN BOTH HANDS: ICD-10-CM

## 2023-11-03 PROBLEM — G89.29 CHRONIC RIGHT UPPER QUADRANT PAIN: Status: RESOLVED | Noted: 2017-06-30 | Resolved: 2023-11-03

## 2023-11-03 PROBLEM — F32.0 CURRENT MILD EPISODE OF MAJOR DEPRESSIVE DISORDER WITHOUT PRIOR EPISODE (HCC): Status: ACTIVE | Noted: 2017-12-06

## 2023-11-03 PROBLEM — R10.11 CHRONIC RIGHT UPPER QUADRANT PAIN: Status: RESOLVED | Noted: 2017-06-30 | Resolved: 2023-11-03

## 2023-11-03 PROCEDURE — 99214 OFFICE O/P EST MOD 30 MIN: CPT | Performed by: PHYSICIAN ASSISTANT

## 2023-11-03 NOTE — PROGRESS NOTES
Assessment/Plan:    Hand numbness/wrist pain - will start with an EMG and direct to specialist based on results    2. DUYEN - well controlled on wellbutrin 450 mg daily, prior auth sent in last week, waiting on approval, xanax prn. Trazodone at night as needed    3. Hypothyroid - on levothyroxine 88 mcg under [facility] end[facility], labs due 4/2024    4. Fm hx osteoporosis - patient will call insurance regading DEXA coverage    F/u as needed    Patient moving to [facility] In January    Subjective:   Chief Complaint   Patient presents with    carpel tunnel     Stiff, hands fall asleep during night  Has been trying brace   Mother has car[el tunnel      Patient ID: Oksana Velasquez is a 46 y.o. female. Patient here with long history of CTS dx years ago by PCP. Has done stretches over the years. Then in the past 6 months symptoms have gotten worse. Initially R>L but now cannot tell which is worse. 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 Hands are numb, left hand cramps. Wrist pain sharp can wake patient at night. Tried a brace but hands still numb. Tried advil with minimal relief. Denies neck pain notes some tension, sometimes will note a right upper arm pain, nothing consistent like the hands. Right handed. DUYEN and depression well controlled on wellbutrin 450 mg. Under stress with moving to [place] and impending shelter in January. Once she is setteled in [place] in 2024 patient will consider tapering back on dose.          The following portions of the patient's history were reviewed and updated as appropriate: allergies, current medications, past family history, past medical history, past social history, past surgical history, and problem list.    Past Medical History:   Diagnosis Date    Acute hemorrhagic cystitis     last assessed 07/22/2013    Allergic     Anxiety     Cholelithiasis with acute cholecystitis Colitis     Depression     Diverticulitis     Diverticulosis     Eczema     Frequent UTI     Headache(784.0) 11/25/2022    Limb swelling     last assessed 03/20/2013    Lung nodule     right    Menorrhagia     last assessed 01/07/2013    Pituitary tumor     Pneumonia 2004    x1     Stress incontinence     Wears contact lenses     Wears glasses      Past Surgical History:   Procedure Laterality Date    BRONCHOSCOPY N/A 9/13/2016    Procedure: BRONCHOSCOPY FLEXIBLE ( FROZEN SECTION) ; Surgeon: Noemi Farah MD;  [facility];  Service:     CHOLECYSTECTOMY      Laparoscopic    COLONOSCOPY      ESOPHAGOGASTRODUODENOSCOPY      ESSURE TUBAL LIGATION      LIPOSUCTION      NH BRONCHOSCOPY NEEDLE BX TRACHEA MAIN STEM&/BRON N/A 9/13/2016    Procedure: ENDOBRONCHIAL ULTRASOUND (EBUS);   Surgeon: Noemi Farah MD;  [facility];  Service: Thoracic    NH CYSTOURETHROSCOPY N/A 11/21/2016    Procedure: CYSTOSCOPY;  Surgeon: Sonny Paniagua MD;  [facility];  Service: UroGynecology           NH EXC B9 LES MRGN XCP SK TG F/E/E/N/L/M 3.1-4.0CM Left 12/8/2016    Procedure: CHEEK SKIN LESION EXCISION/BIOPSY;  Surgeon: Eleuterio Humphrey MD;  [facility];  Service: Plastics    NH LAPAROSCOPY COLECTOMY PARTIAL W/ANASTOMOSIS N/A 6/22/2018    Procedure: ROBOTIC SIGMOID RESECTION;  Surgeon: Shay Mayer MD;  [facility];  Service: Colorectal    NH REPAIR COMPLEX F/C/C/M/N/AX/G/H/F 2.6-7.5 CM Left 12/8/2016    Procedure: COMPLEX CLOSURE;  Surgeon: Eleuterio Humphrey MD;  [facility];  Service: Plastics    NH SIGMOIDOSCOPY FLX DX W/COLLJ SPEC BR/WA IF PFRMD N/A 6/22/2018    Procedure: Abebe Bullion;  Surgeon: Shay Mayer MD;  [facility];  Service: Colorectal    NH SLING OPERATION STRESS INCONTINENCE N/A 11/21/2016    Procedure: Huey Halsted;  Surgeon: Sonny Paniagua MD;  [facility];  Service: UroGynecology           SINUS SURGERY      WISDOM TOOTH EXTRACTION       Family History   Problem Relation Age of Onset    Depression Mother     Alcohol abuse Mother     Anxiety disorder Mother     Prostate cancer Father 68    No Known Problems Daughter     No Known Problems Maternal Grandmother     No Known Problems Maternal Grandfather     No Known Problems Paternal Grandmother     No Known Problems Paternal Grandfather     No Known Problems Son     No Known Problems Maternal Aunt     No Known Problems Paternal Aunt     Alcohol abuse Family     Depression Family     Osteoporosis Family     Substance Abuse Neg Hx     Mental illness Neg Hx      Social History     Socioeconomic History    Marital status:      Spouse name: Not on file    Number of children: Not on file    Years of education: Not on file    Highest education level: Not on file   Occupational History    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[employer]   Tobacco Use    Smoking status: Never    Smokeless tobacco: Never   Vaping Use    Vaping Use: Never used   Substance and Sexual Activity    Alcohol use:  Yes     Alcohol/week: 6.0 standard drinks of alcohol     Types: 2 Glasses of wine, 2 Cans of beer, 2 Shots of liquor per week     Comment: weekends only     Drug use: No    Sexual activity: Not Currently     Partners: Male     Birth control/protection: Surgical   Other Topics Concern    Not on file   Social History Narrative    No caffeine use    Has smoke detectors    Uses safety equipment-seat belts     Social Determinants of Health     Financial Resource Strain: Not on file   Food Insecurity: Not on file   Transportation Needs: Not on file   Physical Activity: Not on file   Stress: Not on file   Social Connections: Not on file   Intimate Partner Violence: Not on file   Housing Stability: Not on file       Current Outpatient Medications:     ALPRAZolam (XANAX) 0.25 mg tablet, Take 1 tablet (0.25 mg total) by mouth daily at bedtime as needed for anxiety, Disp: 20 tablet, Rfl: 0    buPROPion (WELLBUTRIN XL) 150 mg 24 hr tablet, TAKE 3 TABLETS BY MOUTH DAILY, Disp: 90 tablet, Rfl: 1    CRANBERRY PO, Take by mouth  , Disp: , Rfl:     levothyroxine 88 mcg tablet, TAKE 1 TABLET BY MOUTH EVERY DAY, Disp: 30 tablet, Rfl: 11    LORazepam (ATIVAN) 1 mg tablet, Take 1 tablet 1 hour pre the mria nd then 1 tablet at the site and then if needed another 1-2 tablets. , Disp: 4 tablet, Rfl: 0    mometasone (ELOCON) 0.1 % cream, Apply 1 Application topically daily, Disp: 45 g, Rfl: 0    traZODone (DESYREL) 50 mg tablet, TAKE 2 TABLETS BY MOUTH AT BEDTIME, Disp: 60 tablet, Rfl: 3    mometasone (ELOCON) 0.1 % lotion, Apply topically daily (Patient not taking: Reported on 11/3/2023), Disp: 60 mL, Rfl: 0    Review of Systems          Objective:    Vitals:    11/03/23 0809   BP: 110/70   Pulse: 77   Resp: 16   Temp: 98 °F (36.7 °C)   SpO2: 98%   Weight: 61.9 kg (136 lb 6.4 oz)   Height: 5' 2.25" (1.581 m)        Physical Exam  Constitutional:       Appearance: Normal appearance. She is well-developed and normal weight. HENT:      Head: Normocephalic and atraumatic. Musculoskeletal:         General: Normal range of motion. Cervical back: Normal range of motion and neck supple. Comments: Paresthesias perceived entire hand, positive tinels and phalens but all 5 fingers increase numbness from baseline R>L. Full ROM, full strength. Skin:     General: Skin is warm. Neurological:      General: No focal deficit present. Mental Status: She is alert and oriented to person, place, and time. Psychiatric:         Mood and Affect: Mood normal.         Behavior: Behavior normal.         Thought Content:  Thought content normal.         Judgment: Judgment normal.

## 2023-11-10 ENCOUNTER — TELEPHONE (OUTPATIENT)
Age: 53
End: 2023-11-10

## 2023-11-10 DIAGNOSIS — R20.0 NUMBNESS AND TINGLING IN BOTH HANDS: ICD-10-CM

## 2023-11-10 DIAGNOSIS — R20.2 NUMBNESS AND TINGLING IN BOTH HANDS: ICD-10-CM

## 2023-11-10 DIAGNOSIS — R29.898 HAND WEAKNESS: ICD-10-CM

## 2023-11-10 DIAGNOSIS — M25.532 PAIN IN BOTH WRISTS: Primary | ICD-10-CM

## 2023-11-10 DIAGNOSIS — M25.531 PAIN IN BOTH WRISTS: Primary | ICD-10-CM

## 2023-11-10 NOTE — TELEPHONE ENCOUNTER
Patient is being referred to a orthopedics. Please schedule accordingly.     [facility]   [phone]

## 2023-11-13 ENCOUNTER — OFFICE VISIT (OUTPATIENT)
Dept: OBGYN CLINIC | Facility: CLINIC | Age: 53
End: 2023-11-13
Payer: COMMERCIAL

## 2023-11-13 VITALS
HEIGHT: 62 IN | BODY MASS INDEX: 26.31 KG/M2 | DIASTOLIC BLOOD PRESSURE: 70 MMHG | WEIGHT: 143 LBS | SYSTOLIC BLOOD PRESSURE: 118 MMHG

## 2023-11-13 DIAGNOSIS — M25.532 PAIN IN BOTH WRISTS: ICD-10-CM

## 2023-11-13 DIAGNOSIS — M25.531 PAIN IN BOTH WRISTS: ICD-10-CM

## 2023-11-13 DIAGNOSIS — R20.0 NUMBNESS AND TINGLING IN BOTH HANDS: ICD-10-CM

## 2023-11-13 DIAGNOSIS — G56.23 CUBITAL TUNNEL SYNDROME OF BOTH UPPER EXTREMITIES: ICD-10-CM

## 2023-11-13 DIAGNOSIS — R20.2 NUMBNESS AND TINGLING IN BOTH HANDS: ICD-10-CM

## 2023-11-13 DIAGNOSIS — G56.03 CARPAL TUNNEL SYNDROME, BILATERAL: Primary | ICD-10-CM

## 2023-11-13 DIAGNOSIS — R29.898 HAND WEAKNESS: ICD-10-CM

## 2023-11-13 PROCEDURE — 20526 THER INJECTION CARP TUNNEL: CPT | Performed by: ORTHOPAEDIC SURGERY

## 2023-11-13 PROCEDURE — 99204 OFFICE O/P NEW MOD 45 MIN: CPT | Performed by: ORTHOPAEDIC SURGERY

## 2023-11-13 RX ORDER — BETAMETHASONE SODIUM PHOSPHATE AND BETAMETHASONE ACETATE 3; 3 MG/ML; MG/ML
6 INJECTION, SUSPENSION INTRA-ARTICULAR; INTRALESIONAL; INTRAMUSCULAR; SOFT TISSUE
Status: COMPLETED | OUTPATIENT
Start: 2023-11-13 | End: 2023-11-13

## 2023-11-13 RX ORDER — LIDOCAINE HYDROCHLORIDE 10 MG/ML
1 INJECTION, SOLUTION INFILTRATION; PERINEURAL
Status: COMPLETED | OUTPATIENT
Start: 2023-11-13 | End: 2023-11-13

## 2023-11-13 RX ADMIN — BETAMETHASONE SODIUM PHOSPHATE AND BETAMETHASONE ACETATE 6 MG: 3; 3 INJECTION, SUSPENSION INTRA-ARTICULAR; INTRALESIONAL; INTRAMUSCULAR; SOFT TISSUE at 15:30

## 2023-11-13 RX ADMIN — LIDOCAINE HYDROCHLORIDE 1 ML: 10 INJECTION, SOLUTION INFILTRATION; PERINEURAL at 15:30

## 2023-11-13 NOTE — PROGRESS NOTES
Assessment/Plan:  1. Carpal tunnel syndrome, bilateral        2. Pain in both wrists  Ambulatory Referral to Orthopedic Surgery      3. Hand weakness  Ambulatory Referral to Orthopedic Surgery      4. Numbness and tingling in both hands  Ambulatory Referral to Orthopedic Surgery      5. Cubital tunnel syndrome of both upper extremities            Subjective history, physical examination performed, diagnostic imaging reviewed at today's visit  Diagnosis was discussed  Treatment options were discussed which included nonoperative and operative treatment. Risks, benefits, and realistic expectations for treatment options were discussed. The patient was given an opportunity to ask questions. Questions were answered to the patient's satisfaction. Through shared decision making, the patient decided to move forward with  injections for bilateral carpal tunnel syndrome; positional modifications for cubital tunnel syndrome      Hand/upper extremity injection: R carpal tunnel  Keenes Protocol:  Consent: Verbal consent obtained. Risks and benefits: risks, benefits and alternatives were discussed  Consent given by: patient  Time out: Immediately prior to procedure a "time out" was called to verify the correct patient, procedure, equipment, support staff and site/side marked as required.   Patient understanding: patient states understanding of the procedure being performed  Patient identity confirmed: verbally with patient  Supporting Documentation  Indications: therapeutic   Procedure Details  Condition:carpal tunnel syndrome Site: R carpal tunnel   Preparation: Patient was prepped and draped in the usual sterile fashion  Needle size: 25 G  Ultrasound guidance: no  Approach: volar  Medications administered: 1 mL lidocaine 1 %; 6 mg betamethasone acetate-betamethasone sodium phosphate 6 (3-3) mg/mL  Patient tolerance: patient tolerated the procedure well with no immediate complications  Dressing:  Sterile dressing applied       Hand/upper extremity injection: L carpal tunnel  Steward Protocol:  Consent: Verbal consent obtained. Risks and benefits: risks, benefits and alternatives were discussed  Consent given by: patient  Time out: Immediately prior to procedure a "time out" was called to verify the correct patient, procedure, equipment, support staff and site/side marked as required. Patient understanding: patient states understanding of the procedure being performed  Patient identity confirmed: verbally with patient  Supporting Documentation  Indications: therapeutic   Procedure Details  Condition:carpal tunnel syndrome Site: L carpal tunnel   Preparation: Patient was prepped and draped in the usual sterile fashion  Needle size: 25 G  Ultrasound guidance: no  Approach: volar  Medications administered: 6 mg betamethasone acetate-betamethasone sodium phosphate 6 (3-3) mg/mL; 1 mL lidocaine 1 %  Patient tolerance: patient tolerated the procedure well with no immediate complications  Dressing:  Sterile dressing applied             cc: my hands are going to sleep    Subjective:   Mel Bang is a right hand dominant 46 y.o. female who presents bilateral hand numbness and tingling for at least 6 months. She has been wearing splints at night which helped for awhile. Symptoms have worsened over the last few months. PCP ordered electrodiagnostic studies, but earliest appointment is in March 2024. PCP sent here to ask for treatment options. Patient is losing insurance in January as she is moving to [place].             Past Medical History:   Diagnosis Date    Acute hemorrhagic cystitis     last assessed 07/22/2013    Allergic     Anxiety     Cholelithiasis with acute cholecystitis     Colitis     Depression     Diverticulitis     Diverticulosis     Eczema     Frequent UTI     Headache(784.0) 11/25/2022    Limb swelling     last assessed 03/20/2013    Lung nodule     right    Menorrhagia     last assessed 01/07/2013 Pituitary tumor     Pneumonia 2004    x1     Stress incontinence     Wears contact lenses     Wears glasses        Past Surgical History:   Procedure Laterality Date    BRONCHOSCOPY N/A 9/13/2016    Procedure: BRONCHOSCOPY FLEXIBLE ( FROZEN SECTION) ; Surgeon: Slim Burrell MD;  [facility];  Service:     CHOLECYSTECTOMY      Laparoscopic    COLONOSCOPY      ESOPHAGOGASTRODUODENOSCOPY      ESSURE TUBAL LIGATION      LIPOSUCTION      LA BRONCHOSCOPY NEEDLE BX TRACHEA MAIN STEM&/BRON N/A 9/13/2016    Procedure: ENDOBRONCHIAL ULTRASOUND (EBUS);   Surgeon: Slim Burrell MD;  [facility];  Service: Thoracic    LA CYSTOURETHROSCOPY N/A 11/21/2016    Procedure: CYSTOSCOPY;  Surgeon: Samuel Chavez MD;  [facility];  Service: UroGynecology           LA EXC B9 LES MRGN XCP SK TG F/E/E/N/L/M 3.1-4.0CM Left 12/8/2016    Procedure: CHEEK SKIN LESION EXCISION/BIOPSY;  Surgeon: Malena Cruz MD;  [facility];  Service: Plastics    LA LAPAROSCOPY COLECTOMY PARTIAL W/ANASTOMOSIS N/A 6/22/2018    Procedure: ROBOTIC SIGMOID RESECTION;  Surgeon: Lo Cruz MD;  [facility];  Service: Colorectal    LA REPAIR COMPLEX F/C/C/M/N/AX/G/H/F 2.6-7.5 CM Left 12/8/2016    Procedure: COMPLEX CLOSURE;  Surgeon: Malena Cruz MD;  [facility];  Service: Plastics    LA SIGMOIDOSCOPY FLX DX W/COLLJ SPEC BR/WA IF PFRMD N/A 6/22/2018    Procedure: Elvia Doc;  Surgeon: Lo Cruz MD;  [facility];  Service: Colorectal    LA SLING OPERATION STRESS INCONTINENCE N/A 11/21/2016    Procedure: Doren Settler;  Surgeon: Samuel Chavez MD;  [facility];  Service: UroGynecology           SINUS SURGERY      WISDOM TOOTH EXTRACTION         Family History   Problem Relation Age of Onset    Depression Mother     Alcohol abuse Mother     Anxiety disorder Mother     Prostate cancer Father 68    No Known Problems Daughter     No Known Problems Maternal Grandmother     No Known Problems Maternal Grandfather     No Known Problems Paternal Grandmother     No Known Problems Paternal Grandfather     No Known Problems Son     No Known Problems Maternal Aunt     No Known Problems Paternal Aunt     Alcohol abuse Family     Depression Family     Osteoporosis Family     Substance Abuse Neg Hx     Mental illness Neg Hx        Social History     Occupational History    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[employer]   Tobacco Use    Smoking status: Never    Smokeless tobacco: Never   Vaping Use    Vaping Use: Never used   Substance and Sexual Activity    Alcohol use: Yes     Alcohol/week: 6.0 standard drinks of alcohol     Types: 2 Glasses of wine, 2 Cans of beer, 2 Shots of liquor per week     Comment: weekends only     Drug use: No    Sexual activity: Not Currently     Partners: Male     Birth control/protection: Surgical         Current Outpatient Medications:     ALPRAZolam (XANAX) 0.25 mg tablet, Take 1 tablet (0.25 mg total) by mouth daily at bedtime as needed for anxiety, Disp: 20 tablet, Rfl: 0    buPROPion (WELLBUTRIN XL) 150 mg 24 hr tablet, TAKE 3 TABLETS BY MOUTH DAILY, Disp: 90 tablet, Rfl: 1    CRANBERRY PO, Take by mouth  , Disp: , Rfl:     levothyroxine 88 mcg tablet, TAKE 1 TABLET BY MOUTH EVERY DAY, Disp: 30 tablet, Rfl: 11    mometasone (ELOCON) 0.1 % cream, Apply 1 Application topically daily, Disp: 45 g, Rfl: 0    traZODone (DESYREL) 50 mg tablet, TAKE 2 TABLETS BY MOUTH AT BEDTIME, Disp: 60 tablet, Rfl: 3    LORazepam (ATIVAN) 1 mg tablet, Take 1 tablet 1 hour pre the mria nd then 1 tablet at the site and then if needed another 1-2 tablets. , Disp: 4 tablet, Rfl: 0    mometasone (ELOCON) 0.1 % lotion, Apply topically daily (Patient not taking: Reported on 11/3/2023), Disp: 60 mL, Rfl: 0    Allergies   Allergen Reactions    Bactrim [Sulfamethoxazole-Trimethoprim] Hives    Serotonin Reuptake Inhibitors (Ssris) Hives     Other reaction(s): DUE TO PITUITARY ADENOMA    Effexor had bad effects when being weaned off- has a pituitary  tumor    Trimethoprim Rash       Objective:  Vitals:    11/13/23 1512   BP: 118/70       The patient was awake, alert, and oriented to person, place, and time. No acute distress. Normocephalic. EOMI. Mucous membranes moist.  Normal respiratory effort. Examination of the affected extremity was compared to the unaffected extremity. Skin was intact. No swelling or ecchymosis. No deformity. Hand and fingers were warm and well-perfused. Capillary refill was brisk. Full active range of motion of the elbows, forearms, wrists, and fingers. 5/5 elbow flexors/extensors, wrist flexor/extensors, and . Sensation was not decreased in the median nerve distribution. Sensation was not decreased in the ulnar nerve distribution. There was not APB atrophy. There was no intrinsic atrophy. Tinel's at the wrist was Positive. Tinel's at the elbow was Positive. Wrist flexion compression was positive. Evaluation for lacertus syndrome was Negative. Imaging/Diagnostic Studies:    No studies to review        This document was created using speech voice recognition software. Grammatical errors, random word insertions, pronoun errors, and incomplete sentences are an occasional consequence of this system due to software limitations, ambient noise, and hardware issues. Any formal questions or concerns about content, text, or information contained within the body of this dictation should be directly addressed to the provider for clarification.

## 2023-11-20 ENCOUNTER — OFFICE VISIT (OUTPATIENT)
Dept: FAMILY MEDICINE CLINIC | Facility: CLINIC | Age: 53
End: 2023-11-20
Payer: COMMERCIAL

## 2023-11-20 VITALS
OXYGEN SATURATION: 98 % | WEIGHT: 139.7 LBS | SYSTOLIC BLOOD PRESSURE: 120 MMHG | TEMPERATURE: 98.1 F | DIASTOLIC BLOOD PRESSURE: 80 MMHG | RESPIRATION RATE: 15 BRPM | BODY MASS INDEX: 25.35 KG/M2 | HEART RATE: 76 BPM

## 2023-11-20 DIAGNOSIS — J32.9 CHRONIC SINUSITIS, UNSPECIFIED LOCATION: ICD-10-CM

## 2023-11-20 DIAGNOSIS — J30.89 NON-SEASONAL ALLERGIC RHINITIS DUE TO OTHER ALLERGIC TRIGGER: Primary | ICD-10-CM

## 2023-11-20 DIAGNOSIS — F98.8 ADD (ATTENTION DEFICIT DISORDER) WITHOUT HYPERACTIVITY: ICD-10-CM

## 2023-11-20 PROBLEM — K57.32 DIVERTICULITIS OF LARGE INTESTINE WITHOUT PERFORATION OR ABSCESS WITHOUT BLEEDING: Status: RESOLVED | Noted: 2017-12-06 | Resolved: 2023-11-20

## 2023-11-20 PROCEDURE — 99214 OFFICE O/P EST MOD 30 MIN: CPT | Performed by: FAMILY MEDICINE

## 2023-11-20 RX ORDER — FEXOFENADINE HCL 180 MG/1
180 TABLET ORAL DAILY
Refills: 0
Start: 2023-11-20

## 2023-11-20 RX ORDER — PREDNISONE 20 MG/1
TABLET ORAL
Qty: 6 TABLET | Refills: 0 | Status: SHIPPED | OUTPATIENT
Start: 2023-11-20

## 2023-11-20 RX ORDER — CHLORHEXIDINE GLUCONATE ORAL RINSE 1.2 MG/ML
SOLUTION DENTAL
COMMUNITY
Start: 2023-11-01 | End: 2023-11-20

## 2023-11-20 RX ORDER — IBUPROFEN 600 MG/1
TABLET ORAL
COMMUNITY
Start: 2023-11-01 | End: 2023-11-20

## 2023-11-20 RX ORDER — AMOXICILLIN 875 MG/1
TABLET, COATED ORAL
COMMUNITY
Start: 2023-11-01 | End: 2023-11-20 | Stop reason: ALTCHOICE

## 2023-11-20 NOTE — PROGRESS NOTES
Assessment/Plan:    Allergic rhinitis acute  This is despite heavy frost a few times already  Recommended namebrand Allegra daily through the whole year which is safe and will help from frequent viruses and cold  Prednisone 20 twice a day for 3 days as a kick start because patient is having surgery of her gums in the near future, less than 1 week    History of chronic sinus infection  No medicine ever given to her for this    Sarcoid  No problem with prednisone           Subjective:   Helen Fritz is a 46 y. o.female  Chief Complaint   Patient presents with    Sinusitis     Patient is here with head congestion and stuffiness in her sinuses for the last 4 days. She has been on Mucinex as well as ibuprofen and Flonase and it really has not helped  She laid around all weekend because of the pain in her sinuses. No nasal discharge are scant at best.        Past medical history, social history, and family history reviewed as appropriate for the complaint of this patient. MEDICATIONS REVIEWED AND UPDATED    10 point review of systems performed, the remainder of the ROS is negative except for what is noted in the history of chief complaint    Objective:    Vitals:    11/20/23 0813   BP: 120/80   Pulse: 76   Resp: 15   Temp: 98.1 °F (36.7 °C)   SpO2: 98%     Body mass index is 25.35 kg/m².     Physical Exam    Constitutional  Appears healthy, Looks well, Appearance consistent with age    Mental Status  Alert, Oriented, Cooperative, Memory function normal , clean, and reasonable    HEENT  TMs are dull turbinates are small to moderate size purple with some red watery discharge copious pharynx benign sinus tenderness to percussion especially under her eyes and her maxillary sinuses    Neck  No neck mass, No thyromegaly, Good carotid upstrokes bilaterally, trachea midline positive click    Respiratory  Breath sounds normal, No rales, No rhonchi, No wheezing, normal palpation    Cardiac  Regular rhythm without ectopy or murmur no S3-S4, no heave lift or thrill to palpation    Vascular  No leg edema, No pedal edema    Muscular skeletal  No clubbing cyanosis , muscle tone normal    Skin  No appreciable rashes or abnormal appearing lesions

## 2023-11-20 NOTE — PATIENT INSTRUCTIONS
Take the prednisone 1 twice a day after eating for 3 days to help kick start and take away your pain in your sinuses    In the meantime start the Allegra 180 mg once daily and take it throughout the year, many people around him for years and years and years without any issues  Hopefully this will keep you with less colds throughout the year    Recheck as needed

## 2023-12-18 DIAGNOSIS — F41.9 ANXIETY: ICD-10-CM

## 2023-12-18 RX ORDER — BUPROPION HYDROCHLORIDE 150 MG/1
TABLET ORAL
Qty: 90 TABLET | Refills: 1 | Status: SHIPPED | OUTPATIENT
Start: 2023-12-18

## 2023-12-26 ENCOUNTER — OFFICE VISIT (OUTPATIENT)
Dept: OBGYN CLINIC | Facility: CLINIC | Age: 53
End: 2023-12-26
Payer: COMMERCIAL

## 2023-12-26 VITALS
DIASTOLIC BLOOD PRESSURE: 64 MMHG | WEIGHT: 139 LBS | BODY MASS INDEX: 24.63 KG/M2 | HEIGHT: 63 IN | SYSTOLIC BLOOD PRESSURE: 98 MMHG | HEART RATE: 73 BPM

## 2023-12-26 DIAGNOSIS — G56.23 CUBITAL TUNNEL SYNDROME OF BOTH UPPER EXTREMITIES: ICD-10-CM

## 2023-12-26 DIAGNOSIS — G56.03 CARPAL TUNNEL SYNDROME, BILATERAL: Primary | ICD-10-CM

## 2023-12-26 PROCEDURE — 99212 OFFICE O/P EST SF 10 MIN: CPT | Performed by: ORTHOPAEDIC SURGERY

## 2023-12-26 NOTE — PROGRESS NOTES
Assessment/Plan:  1. Carpal tunnel syndrome, bilateral        2. Cubital tunnel syndrome of both upper extremities              The patient's symptoms have resolved following the injections given during the last office visit on 11/13/23.  Through shared decision making, the patient decided to move forward with continued observation.  Discussed possibility of repeat cortisone injections to the carpal tunnels, but she is doing well today so we will defer until she is symptomatic  Continue activities as tolerated.  Follow up prior to her move to [place] in February 2024.      cc: Bilateral hand numbness and tingling    Subjective:   Nick Luna is a right hand dominant 53 y.o. female who presents to the office for follow up of bilateral wrist carpal tunnel syndrome. At last visit on 11/13/23, she received cortisone injections to the bilateral carpal tunnels which were beneficial for her. Her symptoms no longer wake her up throughout the night. No symptoms during the day. She is moving to [place] in February 2024.      Review of Systems   Constitutional:  Negative for chills and fever.   HENT:  Negative for drooling and sneezing.    Eyes:  Negative for redness.   Respiratory:  Negative for cough and wheezing.    Gastrointestinal:  Negative for nausea and vomiting.   Psychiatric/Behavioral:  Negative for behavioral problems. The patient is not nervous/anxious.          Past Medical History:   Diagnosis Date    Acute hemorrhagic cystitis     last assessed 07/22/2013    Allergic     Anxiety     Cholelithiasis with acute cholecystitis     Colitis     Depression     Diverticulitis     Diverticulosis     Eczema     Frequent UTI     Headache(784.0) 11/25/2022    Limb swelling     last assessed 03/20/2013    Lung nodule     right    Menorrhagia     last assessed 01/07/2013    Pituitary tumor     Pneumonia 2004    x1     Stress incontinence     Wears contact lenses     Wears glasses        Past Surgical History:   Procedure  Laterality Date    BRONCHOSCOPY N/A 9/13/2016    Procedure: BRONCHOSCOPY FLEXIBLE ( FROZEN SECTION) ;  Surgeon: Allen Miller MD;  [facility];  Service:     CHOLECYSTECTOMY      Laparoscopic    COLONOSCOPY      ESOPHAGOGASTRODUODENOSCOPY      ESSURE TUBAL LIGATION      LIPOSUCTION      LA BRONCHOSCOPY NEEDLE BX TRACHEA MAIN STEM&/BRON N/A 9/13/2016    Procedure: ENDOBRONCHIAL ULTRASOUND (EBUS);  Surgeon: Allen Miller MD;  [facility];  Service: Thoracic    LA CYSTOURETHROSCOPY N/A 11/21/2016    Procedure: CYSTOSCOPY;  Surgeon: James Alvarado MD;  [facility];  Service: UroGynecology           LA EXC B9 LES MRGN XCP SK TG F/E/E/N/L/M 3.1-4.0CM Left 12/8/2016    Procedure: CHEEK SKIN LESION EXCISION/BIOPSY;  Surgeon: Chad Sanchez MD;  [facility];  Service: Plastics    LA LAPAROSCOPY COLECTOMY PARTIAL W/ANASTOMOSIS N/A 6/22/2018    Procedure: ROBOTIC SIGMOID RESECTION;  Surgeon: AR Bennett MD;  [facility];  Service: Colorectal    LA REPAIR COMPLEX F/C/C/M/N/AX/G/H/F 2.6-7.5 CM Left 12/8/2016    Procedure: COMPLEX CLOSURE;  Surgeon: Chad Sanchez MD;  [facility];  Service: Plastics    LA SIGMOIDOSCOPY FLX DX W/COLLJ SPEC BR/WA IF PFRMD N/A 6/22/2018    Procedure: SIGMOIDOSCOPY FLEXIBLE;  Surgeon: AR Bennett MD;  [facility];  Service: Colorectal    LA SLING OPERATION STRESS INCONTINENCE N/A 11/21/2016    Procedure: TRANSOBTURATOR SLING;  Surgeon: James Alvarado MD;  [facility];  Service: UroGynecology           SINUS SURGERY      WISDOM TOOTH EXTRACTION         Family History   Problem Relation Age of Onset    Depression Mother     Alcohol abuse Mother     Anxiety disorder Mother     Prostate cancer Father 73    No Known Problems Daughter     No Known Problems Maternal Grandmother     No Known Problems Maternal Grandfather     No Known Problems Paternal Grandmother     No Known Problems Paternal Grandfather     No Known  Problems Son     No Known Problems Maternal Aunt     No Known Problems Paternal Aunt     Alcohol abuse Family     Depression Family     Osteoporosis Family     Substance Abuse Neg Hx     Mental illness Neg Hx        Social History     Occupational History    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[employer]   Tobacco Use    Smoking status: Never    Smokeless tobacco: Never   Vaping Use    Vaping status: Never Used   Substance and Sexual Activity    Alcohol use: Yes     Alcohol/week: 6.0 standard drinks of alcohol     Types: 2 Glasses of wine, 2 Cans of beer, 2 Shots of liquor per week     Comment: weekends only     Drug use: No    Sexual activity: Not Currently     Partners: Male     Birth control/protection: Surgical         Current Outpatient Medications:     ALPRAZolam (XANAX) 0.25 mg tablet, Take 1 tablet (0.25 mg total) by mouth daily at bedtime as needed for anxiety, Disp: 20 tablet, Rfl: 0    buPROPion (WELLBUTRIN XL) 150 mg 24 hr tablet, TAKE 3 TABLETS BY MOUTH DAILY, Disp: 90 tablet, Rfl: 1    CRANBERRY PO, Take by mouth  , Disp: , Rfl:     fexofenadine (ALLEGRA) 180 MG tablet, Take 1 tablet (180 mg total) by mouth daily, Disp: , Rfl: 0    levothyroxine 88 mcg tablet, TAKE 1 TABLET BY MOUTH EVERY DAY, Disp: 30 tablet, Rfl: 11    mometasone (ELOCON) 0.1 % cream, Apply 1 Application topically daily, Disp: 45 g, Rfl: 0    predniSONE 20 mg tablet, 1 pill twice a day after eating for 3 days, Disp: 6 tablet, Rfl: 0    traZODone (DESYREL) 50 mg tablet, TAKE 2 TABLETS BY MOUTH AT BEDTIME, Disp: 60 tablet, Rfl: 3    Allergies   Allergen Reactions    Bactrim [Sulfamethoxazole-Trimethoprim] Hives    Serotonin Reuptake Inhibitors (Ssris) Hives     Other reaction(s): DUE TO PITUITARY ADENOMA    Effexor had bad effects when being weaned off- has a pituitary  tumor    Trimethoprim Rash       Objective:  Vitals:    12/26/23 0912   BP: 98/64   Pulse: 73       The patient was awake, alert, and oriented to person, place, and  time.  No acute distress.  Normocephalic.  EOMI.  Mucous membranes moist.  Normal respiratory effort.    Examination of the affected extremity was compared to the unaffected extremity.  Skin was intact.  No swelling or ecchymosis.  No deformity.  Hand and fingers were warm and well-perfused.  Capillary refill was brisk.  Full active range of motion of the elbows, forearms, wrists, and fingers.      Sensation was not decreased in the median nerve distribution.  Sensation was not decreased in the ulnar nerve distribution.  There was no APB atrophy.  There was no intrinsic atrophy. Tinel's at the wrist was negative. Tinel's at the elbow was negative. Wrist flexion compression was negative. Evaluation for lacertus syndrome was Negative.       Imaging/Diagnostic Studies:    No new imaging obtained        This document was created using speech voice recognition software.   Grammatical errors, random word insertions, pronoun errors, and incomplete sentences are an occasional consequence of this system due to software limitations, ambient noise, and hardware issues.   Any formal questions or concerns about content, text, or information contained within the body of this dictation should be directly addressed to the provider for clarification.     Scribe Attestation      I,:  Irina Campuzano am acting as a scribe while in the presence of the attending physician.:       I,:  Mary Abbott MD personally performed the services described in this documentation    as scribed in my presence.:

## 2024-01-17 DIAGNOSIS — G47.00 INSOMNIA, UNSPECIFIED TYPE: ICD-10-CM

## 2024-01-17 RX ORDER — TRAZODONE HYDROCHLORIDE 50 MG/1
TABLET ORAL
Qty: 60 TABLET | Refills: 3 | Status: SHIPPED | OUTPATIENT
Start: 2024-01-17

## 2024-01-18 ENCOUNTER — TELEPHONE (OUTPATIENT)
Age: 54
End: 2024-01-18

## 2024-01-18 DIAGNOSIS — L30.9 ECZEMA, UNSPECIFIED TYPE: Primary | ICD-10-CM

## 2024-01-18 RX ORDER — MOMETASONE FUROATE 1 MG/ML
SOLUTION TOPICAL DAILY
COMMUNITY
End: 2024-01-18 | Stop reason: SDUPTHER

## 2024-01-18 NOTE — TELEPHONE ENCOUNTER
Caller: Patient     Doctor: Scar     Reason for call: Patient is moving to [place] she is asking if she can reschedule her cortisone injection f/u with a different provider if possible patient looking to be reschedule for 2/2 or 2/5   Please advise     Call back#: [phone]

## 2024-01-19 RX ORDER — MOMETASONE FUROATE 1 MG/ML
SOLUTION TOPICAL DAILY
Qty: 60 ML | Refills: 0 | Status: SHIPPED | OUTPATIENT
Start: 2024-01-19

## 2024-02-06 ENCOUNTER — OFFICE VISIT (OUTPATIENT)
Dept: OBGYN CLINIC | Facility: CLINIC | Age: 54
End: 2024-02-06
Payer: COMMERCIAL

## 2024-02-06 VITALS
DIASTOLIC BLOOD PRESSURE: 80 MMHG | BODY MASS INDEX: 25.48 KG/M2 | HEIGHT: 63 IN | SYSTOLIC BLOOD PRESSURE: 126 MMHG | WEIGHT: 143.8 LBS

## 2024-02-06 DIAGNOSIS — G56.03 CARPAL TUNNEL SYNDROME, BILATERAL: Primary | ICD-10-CM

## 2024-02-06 DIAGNOSIS — G56.23 CUBITAL TUNNEL SYNDROME OF BOTH UPPER EXTREMITIES: ICD-10-CM

## 2024-02-06 PROCEDURE — 99213 OFFICE O/P EST LOW 20 MIN: CPT | Performed by: ORTHOPAEDIC SURGERY

## 2024-02-06 PROCEDURE — 20526 THER INJECTION CARP TUNNEL: CPT | Performed by: ORTHOPAEDIC SURGERY

## 2024-02-06 RX ORDER — BETAMETHASONE SODIUM PHOSPHATE AND BETAMETHASONE ACETATE 3; 3 MG/ML; MG/ML
6 INJECTION, SUSPENSION INTRA-ARTICULAR; INTRALESIONAL; INTRAMUSCULAR; SOFT TISSUE
Status: COMPLETED | OUTPATIENT
Start: 2024-02-06 | End: 2024-02-06

## 2024-02-06 RX ORDER — AMOXICILLIN AND CLAVULANATE POTASSIUM 562.5; 437.5; 62.5 MG/1; MG/1; MG/1
2 TABLET, MULTILAYER, EXTENDED RELEASE ORAL 2 TIMES DAILY
Qty: 12 TABLET | Refills: 0 | Status: SHIPPED | OUTPATIENT
Start: 2024-02-06 | End: 2024-02-09

## 2024-02-06 RX ORDER — LIDOCAINE HYDROCHLORIDE 10 MG/ML
1 INJECTION, SOLUTION INFILTRATION; PERINEURAL
Status: COMPLETED | OUTPATIENT
Start: 2024-02-06 | End: 2024-02-06

## 2024-02-06 RX ADMIN — BETAMETHASONE SODIUM PHOSPHATE AND BETAMETHASONE ACETATE 6 MG: 3; 3 INJECTION, SUSPENSION INTRA-ARTICULAR; INTRALESIONAL; INTRAMUSCULAR; SOFT TISSUE at 08:15

## 2024-02-06 RX ADMIN — LIDOCAINE HYDROCHLORIDE 1 ML: 10 INJECTION, SOLUTION INFILTRATION; PERINEURAL at 08:15

## 2024-02-06 NOTE — PROGRESS NOTES
Assessment/Plan:  1. Carpal tunnel syndrome, bilateral        2. Cubital tunnel syndrome of both upper extremities            Patient is experiencing bilateral carpal tunnel syndrome  Treatment options were discussed which included nonoperative and operative treatment.   Risks, benefits, and realistic expectations for treatment options were discussed.    The patient was given an opportunity to ask questions.  Questions were answered to the patient's satisfaction.    Through shared decision making, the patient decided to move forward with bilateral carpal tunnel steroid injection. Patient tolerated this well.  Postinjection instructions were provided.  Augmentin was prescribed to patient today as she has a dentist appointment today. She is scheduled to have a filling revised today.  I expressed concern about risk of infection secondary to transient bacteremia related to dental procedures, especially after CSI.  There is a small risk for infection.  Encouraged her to take dose prior to dental procedure and for 2-3 days later.  She may discuss with dentist as well.   Patient will follow up as needed as she is moving to [place] on 2/7/24    cc: Bilateral hand numbness and tingling     Subjective:   Nick Luna is a right hand dominant 53 y.o. female who presents to the office for follow up of bilateral wrist carpal tunnel syndrome. Patient was last seen 12/26/23 where patient did not receive bilateral CSI as she was having symptomatic relief from her previous injections done on 11/13/2023.  Patient states today her numbness in her bilateral hands returned on 1/1/2024. Patient is interested in repeat steroid injections today.  She is moving to [place] on 2/7/2024.      Review of Systems   Constitutional:  Negative for chills, fever and unexpected weight change.   HENT:  Negative for hearing loss, nosebleeds and sore throat.    Eyes:  Negative for pain, redness and visual disturbance.   Respiratory:  Negative for  cough, shortness of breath and wheezing.    Cardiovascular:  Negative for chest pain, palpitations and leg swelling.   Gastrointestinal:  Negative for abdominal pain, nausea and vomiting.   Endocrine: Negative for polydipsia and polyuria.   Genitourinary:  Negative for dysuria and hematuria.   Musculoskeletal:         See HPI   Skin:  Negative for rash and wound.   Neurological:  Negative for dizziness, numbness and headaches.   Psychiatric/Behavioral:  Negative for decreased concentration and suicidal ideas. The patient is not nervous/anxious.          Past Medical History:   Diagnosis Date    Acute hemorrhagic cystitis     last assessed 07/22/2013    Allergic     Anxiety     Cholelithiasis with acute cholecystitis     Colitis     Depression     Diverticulitis     Diverticulosis     Eczema     Frequent UTI     Headache(784.0) 11/25/2022    Limb swelling     last assessed 03/20/2013    Lung nodule     right    Menorrhagia     last assessed 01/07/2013    Pituitary tumor     Pneumonia 2004    x1     Stress incontinence     Wears contact lenses     Wears glasses        Past Surgical History:   Procedure Laterality Date    BRONCHOSCOPY N/A 9/13/2016    Procedure: BRONCHOSCOPY FLEXIBLE ( FROZEN SECTION) ;  Surgeon: Allen Miller MD;  [facility];  Service:     CHOLECYSTECTOMY      Laparoscopic    COLONOSCOPY      ESOPHAGOGASTRODUODENOSCOPY      ESSURE TUBAL LIGATION      LIPOSUCTION      CO BRONCHOSCOPY NEEDLE BX TRACHEA MAIN STEM&/BRON N/A 9/13/2016    Procedure: ENDOBRONCHIAL ULTRASOUND (EBUS);  Surgeon: Allen Miller MD;  [facility];  Service: Thoracic    CO CYSTOURETHROSCOPY N/A 11/21/2016    Procedure: CYSTOSCOPY;  Surgeon: James Alvarado MD;  [facility];  Service: UroGynecology           CO EXC B9 LES MRGN XCP SK TG F/E/E/N/L/M 3.1-4.0CM Left 12/8/2016    Procedure: CHEEK SKIN LESION EXCISION/BIOPSY;  Surgeon: Chad Sanchez MD;  [facility];  Service: Plastics    CO  LAPAROSCOPY COLECTOMY PARTIAL W/ANASTOMOSIS N/A 6/22/2018    Procedure: ROBOTIC SIGMOID RESECTION;  Surgeon: AR Bennett MD;  [facility];  Service: Colorectal    MN REPAIR COMPLEX F/C/C/M/N/AX/G/H/F 2.6-7.5 CM Left 12/8/2016    Procedure: COMPLEX CLOSURE;  Surgeon: Chad Sanchez MD;  [facility];  Service: Plastics    MN SIGMOIDOSCOPY FLX DX W/COLLJ SPEC BR/WA IF PFRMD N/A 6/22/2018    Procedure: SIGMOIDOSCOPY FLEXIBLE;  Surgeon: AR Bennett MD;  [facility];  Service: Colorectal    MN SLING OPERATION STRESS INCONTINENCE N/A 11/21/2016    Procedure: TRANSOBTURATOR SLING;  Surgeon: James Alvarado MD;  [facility];  Service: UroGynecology           SINUS SURGERY      WISDOM TOOTH EXTRACTION         Family History   Problem Relation Age of Onset    Depression Mother     Alcohol abuse Mother     Anxiety disorder Mother     Prostate cancer Father 73    No Known Problems Daughter     No Known Problems Maternal Grandmother     No Known Problems Maternal Grandfather     No Known Problems Paternal Grandmother     No Known Problems Paternal Grandfather     No Known Problems Son     No Known Problems Maternal Aunt     No Known Problems Paternal Aunt     Alcohol abuse Family     Depression Family     Osteoporosis Family     Substance Abuse Neg Hx     Mental illness Neg Hx        Social History     Occupational History    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[employer]   Tobacco Use    Smoking status: Never    Smokeless tobacco: Never   Vaping Use    Vaping status: Never Used   Substance and Sexual Activity    Alcohol use: Yes     Alcohol/week: 6.0 standard drinks of alcohol     Types: 2 Glasses of wine, 2 Cans of beer, 2 Shots of liquor per week     Comment: weekends only     Drug use: No    Sexual activity: Not Currently     Partners: Male     Birth control/protection: Surgical         Current Outpatient Medications:     ALPRAZolam (XANAX) 0.25 mg tablet, Take 1 tablet  (0.25 mg total) by mouth daily at bedtime as needed for anxiety, Disp: 20 tablet, Rfl: 0    buPROPion (WELLBUTRIN XL) 150 mg 24 hr tablet, TAKE 3 TABLETS BY MOUTH DAILY, Disp: 90 tablet, Rfl: 1    CRANBERRY PO, Take by mouth  , Disp: , Rfl:     fexofenadine (ALLEGRA) 180 MG tablet, Take 1 tablet (180 mg total) by mouth daily, Disp: , Rfl: 0    levothyroxine 88 mcg tablet, TAKE 1 TABLET BY MOUTH EVERY DAY, Disp: 30 tablet, Rfl: 11    mometasone (ELOCON) 0.1 % cream, Apply 1 Application topically daily, Disp: 45 g, Rfl: 0    traZODone (DESYREL) 50 mg tablet, TAKE 2 TABLETS BY MOUTH AT BEDTIME, Disp: 60 tablet, Rfl: 3    mometasone (ELOCON) 0.1 % lotion, Apply topically daily, Disp: 60 mL, Rfl: 0    predniSONE 20 mg tablet, 1 pill twice a day after eating for 3 days, Disp: 6 tablet, Rfl: 0    Allergies   Allergen Reactions    Bactrim [Sulfamethoxazole-Trimethoprim] Hives    Serotonin Reuptake Inhibitors (Ssris) Hives     Other reaction(s): DUE TO PITUITARY ADENOMA    Effexor had bad effects when being weaned off- has a pituitary  tumor    Trimethoprim Rash       Objective:  Vitals:    02/06/24 0808   BP: 126/80       The patient was awake, alert, and oriented to person, place, and time.  No acute distress.  Normocephalic.  EOMI.  Mucous membranes moist.  Normal respiratory effort.    Examination of the affected extremity was compared to the unaffected extremity.  Skin was intact.  No swelling or ecchymosis.  No deformity.  Hand and fingers were warm and well-perfused.  Capillary refill was brisk.  Full active range of motion of the elbows, forearms, wrists, and fingers.      Sensation was not decreased in the median nerve distribution.  Sensation was not decreased in the ulnar nerve distribution.  There was not APB atrophy.  There was no intrinsic atrophy. Tinel's at the wrist was Positive. Tinel's at the elbow was Negative. Wrist flexion compression was negative. Evaluation for lacertus syndrome was  Negative.          Hand/upper extremity injection: bilateral carpal tunnel  [place] Protocol:  Consent: Verbal consent obtained.  Risks and benefits: risks, benefits and alternatives were discussed  Consent given by: patient  Patient understanding: patient states understanding of the procedure being performed  Site marked: the operative site was marked  Patient identity confirmed: verbally with patient  Supporting Documentation  Indications: diagnostic   Procedure Details  Condition:carpal tunnel syndrome Site: bilateral carpal tunnel   Needle size: 25 G  Ultrasound guidance: no  Approach: volar  Medications (Right): 1 mL lidocaine 1 %; 6 mg betamethasone acetate-betamethasone sodium phosphate 6 (3-3) mg/mLMedications (Left): 1 mL lidocaine 1 %; 6 mg betamethasone acetate-betamethasone sodium phosphate 6 (3-3) mg/mL   Patient tolerance: patient tolerated the procedure well with no immediate complications  Dressing:  Sterile dressing applied         Scribe Attestation      I,:  Rancho Ulrich am acting as a scribe while in the presence of the attending physician.:       I,:  Mary Abbott MD personally performed the services described in this documentation    as scribed in my presence.:               This document was created using speech voice recognition software.   Grammatical errors, random word insertions, pronoun errors, and incomplete sentences are an occasional consequence of this system due to software limitations, ambient noise, and hardware issues.   Any formal questions or concerns about content, text, or information contained within the body of this dictation should be directly addressed to the provider for clarification.

## 2024-02-17 DIAGNOSIS — F41.9 ANXIETY: ICD-10-CM

## 2024-02-19 RX ORDER — BUPROPION HYDROCHLORIDE 150 MG/1
TABLET ORAL
Qty: 90 TABLET | Refills: 1 | Status: SHIPPED | OUTPATIENT
Start: 2024-02-19

## 2024-02-21 PROBLEM — Z00.00 HEALTH CARE MAINTENANCE: Status: RESOLVED | Noted: 2018-05-15 | Resolved: 2024-02-21

## 2024-03-12 ENCOUNTER — TELEPHONE (OUTPATIENT)
Age: 54
End: 2024-03-12

## 2024-03-12 NOTE — TELEPHONE ENCOUNTER
Pt moved to [place] (address updated in Epic). She may still come in for her 4/15 appt with Dr Jauregui. Could you please mail her labs from 4/2023 to the [place] address. Thanks

## 2024-03-25 DIAGNOSIS — L30.9 ECZEMA, UNSPECIFIED TYPE: ICD-10-CM

## 2024-03-26 RX ORDER — MOMETASONE FUROATE 1 MG/ML
SOLUTION TOPICAL DAILY
Qty: 60 ML | Refills: 0 | Status: SHIPPED | OUTPATIENT
Start: 2024-03-26

## 2024-03-28 ENCOUNTER — TELEPHONE (OUTPATIENT)
Dept: ENDOCRINOLOGY | Facility: HOSPITAL | Age: 54
End: 2024-03-28

## 2024-03-28 NOTE — TELEPHONE ENCOUNTER
Patient recently moved to [place].  She requested that records be faxed to her new endocrinologist.    [facility]  [address]  [address]  [place]  [address]  (P) [phone]  (F) [phone]    Faxed last 2 office visit notes, lab results & MRI results.

## 2024-04-05 ENCOUNTER — TELEPHONE (OUTPATIENT)
Age: 54
End: 2024-04-05

## 2024-04-05 NOTE — TELEPHONE ENCOUNTER
Pt called  her endo never received the records. Please refax in addition to the referral letter, send to fax # [phone]    And also fax#  [phone]  pt states can't get an appt until  after they get records...

## 2024-04-24 DIAGNOSIS — E03.9 HYPOTHYROIDISM, UNSPECIFIED TYPE: ICD-10-CM

## 2024-04-24 RX ORDER — LEVOTHYROXINE SODIUM 88 UG/1
88 TABLET ORAL DAILY
Qty: 30 TABLET | Refills: 2 | Status: SHIPPED | OUTPATIENT
Start: 2024-04-24

## 2024-04-26 DIAGNOSIS — F41.9 ANXIETY: ICD-10-CM

## 2024-04-26 RX ORDER — BUPROPION HYDROCHLORIDE 150 MG/1
450 TABLET ORAL DAILY
Qty: 90 TABLET | Refills: 1 | Status: SHIPPED | OUTPATIENT
Start: 2024-04-26

## 2024-04-30 DIAGNOSIS — G47.00 INSOMNIA, UNSPECIFIED TYPE: ICD-10-CM

## 2024-05-01 RX ORDER — TRAZODONE HYDROCHLORIDE 50 MG/1
TABLET ORAL
Qty: 60 TABLET | Refills: 1 | Status: SHIPPED | OUTPATIENT
Start: 2024-05-01

## 2024-06-06 ENCOUNTER — DOCUMENTATION (OUTPATIENT)
Dept: ENDOCRINOLOGY | Facility: HOSPITAL | Age: 54
End: 2024-06-06

## 2024-06-25 DIAGNOSIS — F41.9 ANXIETY: ICD-10-CM

## 2024-06-25 RX ORDER — BUPROPION HYDROCHLORIDE 150 MG/1
450 TABLET ORAL DAILY
Qty: 90 TABLET | Refills: 1 | Status: SHIPPED | OUTPATIENT
Start: 2024-06-25

## 2024-07-29 DIAGNOSIS — G47.00 INSOMNIA, UNSPECIFIED TYPE: ICD-10-CM

## 2024-07-29 RX ORDER — TRAZODONE HYDROCHLORIDE 50 MG/1
TABLET ORAL
Qty: 60 TABLET | Refills: 5 | Status: SHIPPED | OUTPATIENT
Start: 2024-07-29

## 2024-08-26 DIAGNOSIS — F41.9 ANXIETY: ICD-10-CM

## 2024-08-26 RX ORDER — BUPROPION HYDROCHLORIDE 150 MG/1
450 TABLET ORAL DAILY
Qty: 90 TABLET | Refills: 1 | Status: SHIPPED | OUTPATIENT
Start: 2024-08-26

## 2025-03-24 ENCOUNTER — TELEPHONE (OUTPATIENT)
Age: 55
End: 2025-03-24

## 2025-03-24 ENCOUNTER — OCCMED (OUTPATIENT)
Dept: URGENT CARE | Facility: CLINIC | Age: 55
End: 2025-03-24

## 2025-03-24 ENCOUNTER — APPOINTMENT (OUTPATIENT)
Dept: LAB | Facility: CLINIC | Age: 55
End: 2025-03-24

## 2025-03-24 DIAGNOSIS — Z02.1 ENCOUNTER FOR PRE-EMPLOYMENT EXAMINATION: Primary | ICD-10-CM

## 2025-03-24 DIAGNOSIS — Z02.1 ENCOUNTER FOR PRE-EMPLOYMENT EXAMINATION: ICD-10-CM

## 2025-03-24 LAB — RUBV IGG SERPL IA-ACNC: 37.8 IU/ML

## 2025-03-24 PROCEDURE — 86765 RUBEOLA ANTIBODY: CPT

## 2025-03-24 PROCEDURE — 86762 RUBELLA ANTIBODY: CPT

## 2025-03-24 PROCEDURE — 36415 COLL VENOUS BLD VENIPUNCTURE: CPT

## 2025-03-24 PROCEDURE — 86480 TB TEST CELL IMMUN MEASURE: CPT

## 2025-03-24 PROCEDURE — 86787 VARICELLA-ZOSTER ANTIBODY: CPT

## 2025-03-24 PROCEDURE — 86735 MUMPS ANTIBODY: CPT

## 2025-03-24 NOTE — TELEPHONE ENCOUNTER
Caller:Patient     Doctor: Dr. Abbott     Reason for call: Patient is submitting paperwork for employment at [employer], Patient is not able to come in due to her having [place] insurance and asked for a call once paperwork is completed     Call back#: [phone]

## 2025-03-25 LAB
GAMMA INTERFERON BACKGROUND BLD IA-ACNC: 0.04 IU/ML
M TB IFN-G BLD-IMP: NEGATIVE
M TB IFN-G CD4+ BCKGRND COR BLD-ACNC: 0.01 IU/ML
M TB IFN-G CD4+ BCKGRND COR BLD-ACNC: 0.02 IU/ML
MEV IGG SER QL IA: >300 AU/ML
MEV IGG SER QL IA: POSITIVE
MITOGEN IGNF BCKGRD COR BLD-ACNC: 9.91 IU/ML
MUV IGG SER QL IA: <5 AU/ML
MUV IGG SER QL IA: NEGATIVE
VZV IGG SER QL IA: 11.2 S/CO
VZV IGG SER QL IA: POSITIVE

## 2025-03-25 NOTE — TELEPHONE ENCOUNTER
Caller: Patient     Doctor: Dr. Abbott    Reason for call: Patient is calling back regarding the ppwk. She stated the ppwk is attached to a message she sent through her [employer].     Call back#: [phone]

## 2025-05-15 ENCOUNTER — HOSPITAL ENCOUNTER (OUTPATIENT)
Dept: HOSPITAL 99 - GI | Age: 55
Discharge: HOME | End: 2025-05-15
Payer: COMMERCIAL

## 2025-05-15 DIAGNOSIS — Z86.0100: ICD-10-CM

## 2025-05-15 DIAGNOSIS — Z12.11: Primary | ICD-10-CM

## 2025-05-15 DIAGNOSIS — Z98.0: ICD-10-CM

## 2025-05-15 DIAGNOSIS — K57.30: ICD-10-CM

## 2025-06-18 ENCOUNTER — TELEPHONE (OUTPATIENT)
Dept: FAMILY MEDICINE CLINIC | Facility: CLINIC | Age: 55
End: 2025-06-18

## 2025-06-18 NOTE — TELEPHONE ENCOUNTER
Hello,  Please remove Linh from banner as patient has established with [facility] on 05/20/2025.    Thank you.

## 2025-06-28 NOTE — TELEPHONE ENCOUNTER
06/27/25 10:57 PM     The office's request has been received and reviewed.    The PCP has been successfully removed with a patient attribution note.     This message will now be completed.    Thank you  Ritika Berman

## (undated) DEVICE — FIRST STEP BEDSIDE KIT - STAND-UP POUCH, ENDOSCOPIC CLEANING PAD - 1 POUCH: Brand: FIRST STEP BEDSIDE KIT - STAND-UP POUCH, ENDOSCOPIC CLEANING PAD

## (undated) DEVICE — GLOVE INDICATOR PI UNDERGLOVE SZ 8 BLUE

## (undated) DEVICE — Device: Brand: DEFENDO AIR/WATER/SUCTION AND BIOPSY VALVE

## (undated) DEVICE — SUT VICRYL 2-0 REEL 54 IN J286G

## (undated) DEVICE — SUT PDS II 1 CTX 36 IN Z371T

## (undated) DEVICE — CANNULA SEAL

## (undated) DEVICE — ADHESIVE SKN CLSR HISTOACRYL FLEX 0.5ML LF

## (undated) DEVICE — CURVED INTRALUMINAL STAPLER (ILS) 24 TITANIUM ADJUSTABLE HEIGHT STAPLES

## (undated) DEVICE — MONOPOLAR CURVED SCISSORS: Brand: ENDOWRIST

## (undated) DEVICE — SPONGE STICK WITH PVP-I: Brand: KENDALL

## (undated) DEVICE — ASTOUND STANDARD SURGICAL GOWN, XL: Brand: CONVERTORS

## (undated) DEVICE — INTENDED FOR TISSUE SEPARATION, AND OTHER PROCEDURES THAT REQUIRE A SHARP SURGICAL BLADE TO PUNCTURE OR CUT.: Brand: BARD-PARKER SAFETY BLADES SIZE 15, STERILE

## (undated) DEVICE — CO2 AND WATER TUBING/CAP SET FOR OLYMPUS® SCOPES & UCR: Brand: ERBE

## (undated) DEVICE — HEM-O-LOK CLIP CARTRIDGE MED/LARGE DA VINCI SI/XI

## (undated) DEVICE — STRL COTTON TIP APPLCTR 6IN PK: Brand: CARDINAL HEALTH

## (undated) DEVICE — 3000CC GUARDIAN II: Brand: GUARDIAN

## (undated) DEVICE — SUT MONOCRYL 4-0 PS-2 18 IN Y496G

## (undated) DEVICE — GLOVE INDICATOR PI UNDERGLOVE SZ 7 BLUE

## (undated) DEVICE — LUBRICANT INST ELECTROLUBE ANTISTK WO PAD

## (undated) DEVICE — COLUMN DRAPE

## (undated) DEVICE — CLAMP TOWEL TUBING DISPOSABLE

## (undated) DEVICE — TRAY FOLEY 16FR URIMETER SURESTEP

## (undated) DEVICE — ENDOPATH XCEL BLADELESS TROCARS WITH STABILITY SLEEVES: Brand: ENDOPATH XCEL

## (undated) DEVICE — TIP COVER ACCESSORY

## (undated) DEVICE — ENDOPATH ECHELON ENDOSCOPIC LINEAR CUTTER RELOADS, GREEN, 60MM: Brand: ECHELON ENDOPATH

## (undated) DEVICE — SUT SILK 2-0 TIES 144 IN LA55G

## (undated) DEVICE — GLOVE SRG BIOGEL ECLIPSE 8

## (undated) DEVICE — ECHELON FLEX 60 ARTICULATING ENDOSCOPIC LINEAR CUTTER (NO CARTRIDGE): Brand: ECHELON FLEX ENDOPATH

## (undated) DEVICE — SUT VICRYL 0 CT-1 27 IN J260H

## (undated) DEVICE — POOLE SUCTION HANDLE: Brand: CARDINAL HEALTH

## (undated) DEVICE — GLOVE SRG BIOGEL 7.5

## (undated) DEVICE — PACK ROBOTIC PROSTATE PBDS DA VINCI SI/XI

## (undated) DEVICE — INTENDED FOR TISSUE SEPARATION, AND OTHER PROCEDURES THAT REQUIRE A SHARP SURGICAL BLADE TO PUNCTURE OR CUT.: Brand: BARD-PARKER SAFETY BLADES SIZE 11, STERILE

## (undated) DEVICE — TUBING SUCTION 5MM X 12 FT

## (undated) DEVICE — SUT PROLENE 2-0 KS 30 IN 8623H

## (undated) DEVICE — ACCESS PLATFORM FOR MINIMALLY INVASIVE SURGERY.: Brand: GELPORT® LAPAROSCOPIC  SYSTEM

## (undated) DEVICE — GLOVE INDICATOR PI UNDERGLOVE SZ 8.5 BLUE

## (undated) DEVICE — FENESTRATED BIPOLAR FORCEPS: Brand: ENDOWRIST

## (undated) DEVICE — KERLIX BANDAGE ROLL: Brand: KERLIX

## (undated) DEVICE — ARM DRAPE

## (undated) DEVICE — PROGRASP FORCEPS: Brand: ENDOWRIST

## (undated) DEVICE — MEDIUM-LARGE CLIP APPLIER: Brand: ENDOWRIST

## (undated) DEVICE — GLOVE SRG BIOGEL ECLIPSE 6.5

## (undated) DEVICE — CHLORAPREP HI-LITE 26ML ORANGE

## (undated) DEVICE — TRAVELKIT CONTAINS FIRST STEP KIT (200ML EP-4 KIT) AND SOILED SCOPE BAG - 1 KIT: Brand: TRAVELKIT CONTAINS FIRST STEP KIT AND SOILED SCOPE BAG

## (undated) DEVICE — PLUMEPEN PRO 10FT

## (undated) DEVICE — GLOVE SRG BIOGEL ECLIPSE 7